# Patient Record
Sex: FEMALE | Race: WHITE | NOT HISPANIC OR LATINO | Employment: FULL TIME | URBAN - METROPOLITAN AREA
[De-identification: names, ages, dates, MRNs, and addresses within clinical notes are randomized per-mention and may not be internally consistent; named-entity substitution may affect disease eponyms.]

---

## 2017-09-13 ENCOUNTER — OFFICE VISIT (OUTPATIENT)
Dept: OBSTETRICS AND GYNECOLOGY | Facility: CLINIC | Age: 42
End: 2017-09-13

## 2017-09-13 VITALS
HEIGHT: 65 IN | WEIGHT: 119 LBS | BODY MASS INDEX: 19.83 KG/M2 | DIASTOLIC BLOOD PRESSURE: 60 MMHG | SYSTOLIC BLOOD PRESSURE: 110 MMHG

## 2017-09-13 DIAGNOSIS — Z12.4 PAP SMEAR FOR CERVICAL CANCER SCREENING: ICD-10-CM

## 2017-09-13 DIAGNOSIS — Z13.9 SCREENING: ICD-10-CM

## 2017-09-13 DIAGNOSIS — Z30.013 INITIATION OF DEPO PROVERA: ICD-10-CM

## 2017-09-13 DIAGNOSIS — Z12.31 SCREENING MAMMOGRAM, ENCOUNTER FOR: ICD-10-CM

## 2017-09-13 DIAGNOSIS — Z11.3 SCREEN FOR STD (SEXUALLY TRANSMITTED DISEASE): ICD-10-CM

## 2017-09-13 DIAGNOSIS — Z01.419 WELL FEMALE EXAM WITH ROUTINE GYNECOLOGICAL EXAM: Primary | ICD-10-CM

## 2017-09-13 DIAGNOSIS — F17.200 SMOKER UNMOTIVATED TO QUIT: ICD-10-CM

## 2017-09-13 PROBLEM — Z30.42 DEPO-PROVERA CONTRACEPTIVE STATUS: Status: ACTIVE | Noted: 2017-09-13

## 2017-09-13 LAB
B-HCG UR QL: NEGATIVE
BILIRUB BLD-MCNC: NEGATIVE MG/DL
CLARITY, POC: CLEAR
COLOR UR: YELLOW
GLUCOSE UR STRIP-MCNC: NEGATIVE MG/DL
INTERNAL NEGATIVE CONTROL: NEGATIVE
INTERNAL POSITIVE CONTROL: POSITIVE
KETONES UR QL: NEGATIVE
LEUKOCYTE EST, POC: NEGATIVE
Lab: NORMAL
NITRITE UR-MCNC: NEGATIVE MG/ML
PH UR: 6 [PH] (ref 5–8)
PROT UR STRIP-MCNC: NEGATIVE MG/DL
RBC # UR STRIP: ABNORMAL /UL
SP GR UR: 1 (ref 1–1.03)
UROBILINOGEN UR QL: NORMAL

## 2017-09-13 PROCEDURE — 99386 PREV VISIT NEW AGE 40-64: CPT | Performed by: OBSTETRICS & GYNECOLOGY

## 2017-09-13 PROCEDURE — 81002 URINALYSIS NONAUTO W/O SCOPE: CPT | Performed by: OBSTETRICS & GYNECOLOGY

## 2017-09-13 PROCEDURE — 81025 URINE PREGNANCY TEST: CPT | Performed by: OBSTETRICS & GYNECOLOGY

## 2017-09-13 PROCEDURE — 96372 THER/PROPH/DIAG INJ SC/IM: CPT | Performed by: OBSTETRICS & GYNECOLOGY

## 2017-09-13 RX ORDER — ALBUTEROL SULFATE 90 UG/1
2 AEROSOL, METERED RESPIRATORY (INHALATION) EVERY 4 HOURS PRN
Refills: 4 | COMMUNITY
Start: 2017-06-20 | End: 2021-12-22

## 2017-09-13 RX ORDER — MEDROXYPROGESTERONE ACETATE 150 MG/ML
150 INJECTION, SUSPENSION INTRAMUSCULAR ONCE
Status: COMPLETED | OUTPATIENT
Start: 2017-09-13 | End: 2017-09-13

## 2017-09-13 RX ORDER — MEDROXYPROGESTERONE ACETATE 150 MG/ML
150 INJECTION, SUSPENSION INTRAMUSCULAR
Qty: 1 EACH | Refills: 4 | Status: SHIPPED | OUTPATIENT
Start: 2017-09-13 | End: 2017-09-13

## 2017-09-13 RX ORDER — FEXOFENADINE HCL AND PSEUDOEPHEDRINE HCI 180; 240 MG/1; MG/1
1 TABLET, EXTENDED RELEASE ORAL AS NEEDED
COMMUNITY
End: 2020-08-23

## 2017-09-13 RX ORDER — MEDROXYPROGESTERONE ACETATE 150 MG/ML
150 INJECTION, SUSPENSION INTRAMUSCULAR
Qty: 1 EACH | Refills: 3 | Status: SHIPPED | OUTPATIENT
Start: 2017-09-13 | End: 2017-12-06 | Stop reason: SDUPTHER

## 2017-09-13 RX ADMIN — MEDROXYPROGESTERONE ACETATE 150 MG: 150 INJECTION, SUSPENSION INTRAMUSCULAR at 11:21

## 2017-09-13 NOTE — PROGRESS NOTES
[facility]  Routine Annual Visit    2017    Patient: Kathleen SEPULVEDA          MR#:[id number]      History of Present Illness    41 y.o. female  who presents for annual exam.    Patient presents without complaints feeling well.  She is an older established patient with no follow-up here since .  Patient's currently using condoms for contraception and wishes to pursue a different method.  Multiple methods were discussed and the patient is resigned to Depo-Provera    Patient's last menstrual period was 2017 (exact date).  Obstetric History:  OB History      Para Term  AB TAB SAB Ectopic Multiple Living    3 3 3       3         Menstrual History:  Menarche age: 6 years  Patient's last menstrual period was 2017 (exact date).  Period Duration (Days): 7  Period Pattern: (!) Irregular  Menstrual Flow: Heavy  Menstrual Control: Maxi pad, Tampon    Sexual History:       ________________________________________  Patient Active Problem List   Diagnosis   • Well female exam with routine gynecological exam   • Smoker unmotivated to quit   • Depo-Provera contraceptive status       Past Medical History:   Diagnosis Date   • Abnormal Pap smear of cervix    • Asthma    • Degenerative disc disease, lumbar    • Depression    • Dysmenorrhea    • Endomyometritis    • Genital warts    • Headache    • Hypothyroid     hyperthyroid   • Irregular heart beat    • Menorrhagia        Past Surgical History:   Procedure Laterality Date   • BREAST BIOPSY      Left Breast Benign    • WISDOM TOOTH EXTRACTION     • WISDOM TOOTH EXTRACTION         History   Smoking Status   • Current Every Day Smoker   • Packs/day: 1.00   • Years: 30.00   • Types: Cigarettes   Smokeless Tobacco   • Never Used       Family History   Problem Relation Age of Onset   • Hypertension Mother    • Heart disease Mother    • Asthma Mother    • Diabetes Mother    • Colon cancer Mother 26  "  • Melanoma Mother      mole on ankle   • Brain cancer Mother      unsure of age   • Cervical cancer Mother      unsure of age   • Stroke Father    • Hypertension Father        Prior to Admission medications    Medication Sig Start Date End Date Taking? Authorizing Provider   fexofenadine-pseudoephedrine (ALLEGRA-D 24) 180-240 MG per 24 hr tablet Take 1 tablet by mouth Daily.   Yes Historical Provider, MD CHUNG  (90 Base) MCG/ACT inhaler  6/20/17  Yes Historical Provider, MD     ________________________________________    Current contraception: condoms  History of abnormal Pap smear: no  Family history of uterine or ovarian cancer: yes - mother  Family History of colon cancer/colon polyps: yes - Mother  History of abnormal mammogram: no  History of abnormal lipids: no    The following portions of the patient's history were reviewed and updated as appropriate: allergies, current medications, past family history, past medical history, past social history, past surgical history and problem list.    Review of Systems    Pertinent items are noted in HPI.     Objective   Physical Exam    /60  Ht 65\" (165.1 cm)  Wt 119 lb (54 kg)  LMP 09/03/2017 (Exact Date)  Breastfeeding? No  BMI 19.8 kg/m2   BP Readings from Last 3 Encounters:   09/13/17 110/60      Wt Readings from Last 3 Encounters:   09/13/17 119 lb (54 kg)        BMI: Estimated body mass index is 19.8 kg/(m^2) as calculated from the following:    Height as of this encounter: 65\" (165.1 cm).    Weight as of this encounter: 119 lb (54 kg).    General:   alert, appears stated age and cooperative   Heart: regular rate and rhythm, S1, S2 normal, no murmur, click, rub or gallop   Lungs: clear to auscultation bilaterally   Abdomen: soft, non-tender, without masses or organomegaly   Breast: inspection negative, no nipple discharge or bleeding, no masses or nodularity palpable   Vulva: normal   Vagina: normal mucosa   Cervix: no lesions   Uterus: " normal size   Adnexa: normal adnexa     As part of wellness and prevention, the following topics were discussed with the patient:    []  Nutrition  []  Physical activity/regular exercise   []  Healthy weight  []  Injury prevention  []  Substance misuse/abuse  []  Sexual behavior  []  STD prevention  [x]  Contaception  []  Dental health  []  Mental health  []  Immunization  [x]  Encouraged SBE       Assessment:    Kathleen was seen today for gynecologic exam.    Diagnoses and all orders for this visit:    Well female exam with routine gynecological exam    Screening  -     POC Urinalysis Dipstick    Pap smear for cervical cancer screening  -     IgP, Aptima HPV    Smoker unmotivated to quit    Screening mammogram, encounter for  -     Mammo Screening Bilateral With CAD; Future    Other orders  -     medroxyPROGESTERone (DEPO-PROVERA) 150 MG/ML injection; Inject 1 mL into the shoulder, thigh, or buttocks Every 3 (Three) Months.          Plan:  Return in about 3 months (around 12/13/2017) for depo provera.      Henri Painter MD  9/13/2017 11:19 AM

## 2017-09-15 ENCOUNTER — TELEPHONE (OUTPATIENT)
Dept: OBSTETRICS AND GYNECOLOGY | Facility: CLINIC | Age: 42
End: 2017-09-15

## 2017-09-15 LAB
C TRACH RRNA SPEC QL NAA+PROBE: NEGATIVE
N GONORRHOEA RRNA SPEC QL NAA+PROBE: NEGATIVE
T VAGINALIS RRNA SPEC QL NAA+PROBE: NEGATIVE

## 2017-09-15 NOTE — TELEPHONE ENCOUNTER
Mailed patient Depo Provera patient information sheet.  Couldn't locate when patient was in for her annual visit 9/13/17.

## 2017-09-18 ENCOUNTER — TELEPHONE (OUTPATIENT)
Dept: OBSTETRICS AND GYNECOLOGY | Facility: CLINIC | Age: 42
End: 2017-09-18

## 2017-09-18 LAB
CYTOLOGIST CVX/VAG CYTO: NORMAL
CYTOLOGY CVX/VAG DOC THIN PREP: NORMAL
DX ICD CODE: NORMAL
HIV 1 & 2 AB SER-IMP: NORMAL
HPV I/H RISK 4 DNA CVX QL PROBE+SIG AMP: NEGATIVE
OTHER STN SPEC: NORMAL
PATH REPORT.FINAL DX SPEC: NORMAL
STAT OF ADQ CVX/VAG CYTO-IMP: NORMAL

## 2017-09-18 NOTE — TELEPHONE ENCOUNTER
----- Message from Henri Painter MD sent at 9/18/2017 10:55 AM EDT -----  Call pt:  PAP is negative.

## 2017-09-27 ENCOUNTER — APPOINTMENT (OUTPATIENT)
Dept: MAMMOGRAPHY | Facility: HOSPITAL | Age: 42
End: 2017-09-27
Attending: OBSTETRICS & GYNECOLOGY

## 2017-12-06 ENCOUNTER — CLINICAL SUPPORT (OUTPATIENT)
Dept: OBSTETRICS AND GYNECOLOGY | Facility: CLINIC | Age: 42
End: 2017-12-06

## 2017-12-06 DIAGNOSIS — Z30.42 ENCOUNTER FOR DEPO-PROVERA CONTRACEPTION: ICD-10-CM

## 2017-12-06 DIAGNOSIS — Z13.9 SPECIAL SCREENING: Primary | ICD-10-CM

## 2017-12-06 LAB
B-HCG UR QL: NEGATIVE
INTERNAL NEGATIVE CONTROL: NEGATIVE
INTERNAL POSITIVE CONTROL: POSITIVE
Lab: NORMAL

## 2017-12-06 PROCEDURE — 96372 THER/PROPH/DIAG INJ SC/IM: CPT | Performed by: OBSTETRICS & GYNECOLOGY

## 2017-12-06 PROCEDURE — 81025 URINE PREGNANCY TEST: CPT | Performed by: OBSTETRICS & GYNECOLOGY

## 2017-12-06 RX ORDER — MEDROXYPROGESTERONE ACETATE 150 MG/ML
150 INJECTION, SUSPENSION INTRAMUSCULAR ONCE
Status: COMPLETED | OUTPATIENT
Start: 2017-12-06 | End: 2017-12-06

## 2017-12-06 RX ADMIN — MEDROXYPROGESTERONE ACETATE 150 MG: 150 INJECTION, SUSPENSION INTRAMUSCULAR at 10:11

## 2018-02-28 ENCOUNTER — CLINICAL SUPPORT (OUTPATIENT)
Dept: OBSTETRICS AND GYNECOLOGY | Age: 43
End: 2018-02-28

## 2018-02-28 DIAGNOSIS — Z30.42 ENCOUNTER FOR DEPO-PROVERA CONTRACEPTION: ICD-10-CM

## 2018-02-28 DIAGNOSIS — Z13.9 SPECIAL SCREENING: Primary | ICD-10-CM

## 2018-02-28 PROCEDURE — 81025 URINE PREGNANCY TEST: CPT | Performed by: OBSTETRICS & GYNECOLOGY

## 2018-02-28 PROCEDURE — 96372 THER/PROPH/DIAG INJ SC/IM: CPT | Performed by: OBSTETRICS & GYNECOLOGY

## 2018-02-28 RX ORDER — MEDROXYPROGESTERONE ACETATE 150 MG/ML
150 INJECTION, SUSPENSION INTRAMUSCULAR ONCE
Status: COMPLETED | OUTPATIENT
Start: 2018-02-28 | End: 2018-02-28

## 2018-02-28 RX ADMIN — MEDROXYPROGESTERONE ACETATE 150 MG: 150 INJECTION, SUSPENSION INTRAMUSCULAR at 13:26

## 2018-05-23 ENCOUNTER — PROCEDURE VISIT (OUTPATIENT)
Dept: OBSTETRICS AND GYNECOLOGY | Age: 43
End: 2018-05-23

## 2018-05-23 ENCOUNTER — OFFICE VISIT (OUTPATIENT)
Dept: OBSTETRICS AND GYNECOLOGY | Age: 43
End: 2018-05-23

## 2018-05-23 VITALS
WEIGHT: 131 LBS | HEIGHT: 65 IN | DIASTOLIC BLOOD PRESSURE: 60 MMHG | SYSTOLIC BLOOD PRESSURE: 110 MMHG | BODY MASS INDEX: 21.83 KG/M2

## 2018-05-23 DIAGNOSIS — Z30.42 DEPO-PROVERA CONTRACEPTIVE STATUS: ICD-10-CM

## 2018-05-23 DIAGNOSIS — Z11.3 SCREENING FOR STD (SEXUALLY TRANSMITTED DISEASE): ICD-10-CM

## 2018-05-23 DIAGNOSIS — N93.9 ABNORMAL UTERINE BLEEDING (AUB): Primary | ICD-10-CM

## 2018-05-23 DIAGNOSIS — F17.200 SMOKER UNMOTIVATED TO QUIT: ICD-10-CM

## 2018-05-23 DIAGNOSIS — D25.1 FIBROIDS, INTRAMURAL: ICD-10-CM

## 2018-05-23 DIAGNOSIS — Z13.9 SPECIAL SCREENING: ICD-10-CM

## 2018-05-23 PROCEDURE — 76830 TRANSVAGINAL US NON-OB: CPT | Performed by: OBSTETRICS & GYNECOLOGY

## 2018-05-23 PROCEDURE — 99214 OFFICE O/P EST MOD 30 MIN: CPT | Performed by: OBSTETRICS & GYNECOLOGY

## 2018-05-23 RX ORDER — ACETAMINOPHEN AND CODEINE PHOSPHATE 120; 12 MG/5ML; MG/5ML
1 SOLUTION ORAL DAILY
Qty: 28 TABLET | Refills: 12 | Status: SHIPPED | OUTPATIENT
Start: 2018-05-23 | End: 2018-06-12

## 2018-05-23 NOTE — PROGRESS NOTES
Ultrasound Note     2018    Patient:  Kathleen Villanueva      MR#:[id number]    42 y.o.   for GYN US for AUB     Patient Active Problem List   Diagnosis   • Well female exam with routine gynecological exam   • Smoker unmotivated to quit   • Abnormal uterine bleeding (AUB)       [See the scanned report in the media tab for more details]    Impression    1.  Normal size uterus: 6.4 x 3.4 x 3.9 cm  2.  Endometrium: 2.1 mm  3:  Myometrium: Predominantly heterogeneous with a single fibroid 2.2 x 1.6 cm  4.  Ovaries Left: Normal, Right normal    Relevant comparison data available:  [x]  None          Henri Painter MD  2018 3:41 PM

## 2018-05-23 NOTE — PROGRESS NOTES
2018      Patient:  Kathleen Villanueva   MR#:[id number]    Office note    Chief Complaint   Patient presents with   • Follow-up     AUB, spotting on and off since aug , depo did not help       Subjective     History of Present Illness  42 y.o. female  the patient presents with complaint of intermittent mostly mild frequent breakthrough bleeding.  She reports minimal improvement on the Depo-Provera shot.  She states she can never go anywhere without a pad and is looking for some type of long-term solution.    Ultrasound is performed and reviewed with the patient.  Findings show a normal uterus with endometrial lining 2.1 mm both ovaries appear within normal limits there is a small 1.6 x 2.2 cm fibroid that's posterior and intramural.    Discussed multiple options including endometrial ablation, alternative hormonal therapy and finally minimally invasive hysterectomy.  After discussing options the patient's most interested in trying a different oral hormonal therapy.  We discussed that estrogens precluded in a tobacco user but Micronor progestin only oral contraceptive pill as an option.      Patient Active Problem List   Diagnosis   • Well female exam with routine gynecological exam   • Smoker unmotivated to quit   • Abnormal uterine bleeding (AUB)       Past Medical History:   Diagnosis Date   • Abnormal Pap smear of cervix    • Asthma    • Degenerative disc disease, lumbar    • Depression    • Dysmenorrhea    • Endomyometritis    • Genital warts    • Headache    • Hypothyroid     hyperthyroid   • Irregular heart beat    • Menorrhagia        Past Surgical History:   Procedure Laterality Date   • BREAST BIOPSY      Left Breast Benign    • WISDOM TOOTH EXTRACTION     • WISDOM TOOTH EXTRACTION         Obstetric History:  OB History      Para Term  AB Living    3 3 3     3    SAB TAB Ectopic Molar Multiple Live Births              3         Menstrual  History:     No LMP recorded (lmp unknown).       #: 1, Date: 07/1997, Sex: Male, Weight: 3459 g (7 lb 10 oz), GA: 40w0d, Delivery: Vaginal, Spontaneous Delivery, Apgar1: None, Apgar5: None, Living: Living, Birth Comments: None    #: 2, Date: 04/19/04, Sex: Male, Weight: 3033 g (6 lb 11 oz), GA: 38w0d, Delivery: Vaginal, Spontaneous Delivery, Apgar1: 9, Apgar5: 9, Living: Living, Birth Comments: None    #: 3, Date: 01/04/07, Sex: Female, Weight: 2778 g (6 lb 2 oz), GA: 39w0d, Delivery: Vaginal, Spontaneous Delivery, Apgar1: 8, Apgar5: 9, Living: Living, Birth Comments: None      Family History   Problem Relation Age of Onset   • Hypertension Mother    • Heart disease Mother    • Asthma Mother    • Diabetes Mother    • Colon cancer Mother 26   • Melanoma Mother         mole on ankle   • Brain cancer Mother         unsure of age   • Cervical cancer Mother         unsure of age   • Stroke Father    • Hypertension Father    • Breast cancer Neg Hx    • Ovarian cancer Neg Hx    • Uterine cancer Neg Hx    • Prostate cancer Neg Hx        Social History   Substance Use Topics   • Smoking status: Current Every Day Smoker     Packs/day: 1.00     Years: 30.00     Types: Cigarettes   • Smokeless tobacco: Never Used   • Alcohol use Yes      Comment: rare       Ultram [tramadol]      Current Outpatient Prescriptions:   •  fexofenadine-pseudoephedrine (ALLEGRA-D 24) 180-240 MG per 24 hr tablet, Take 1 tablet by mouth Daily., Disp: , Rfl:   •  VENTOLIN  (90 Base) MCG/ACT inhaler, , Disp: , Rfl: 4  •  norethindrone (MICRONOR) 0.35 MG tablet, Take 1 tablet by mouth Daily., Disp: 28 tablet, Rfl: 12    The following portions of the patient's history were reviewed and updated as appropriate: allergies, current medications, past family history, past medical history, past social history, past surgical history and problem list.    Review of Systems   Constitutional: Negative.    Respiratory: Negative.    Cardiovascular: Negative.   "  Gastrointestinal: Negative.    Genitourinary: Positive for menstrual problem and vaginal bleeding.   Psychiatric/Behavioral: Negative.        BP Readings from Last 3 Encounters:   05/23/18 110/60   09/13/17 110/60      Wt Readings from Last 3 Encounters:   05/23/18 59.4 kg (131 lb)   09/13/17 54 kg (119 lb)      BMI: Estimated body mass index is 21.8 kg/m² as calculated from the following:    Height as of this encounter: 165.1 cm (65\").    Weight as of this encounter: 59.4 kg (131 lb).  BSA: Estimated body surface area is 1.65 meters squared as calculated from the following:    Height as of this encounter: 165.1 cm (65\").    Weight as of this encounter: 59.4 kg (131 lb).    Objective   Physical Exam   Constitutional: She is oriented to person, place, and time. She appears well-developed and well-nourished.   Cardiovascular: Normal rate and regular rhythm.    Pulmonary/Chest: Effort normal and breath sounds normal.   Abdominal: Soft. Bowel sounds are normal. She exhibits no distension and no mass. There is no tenderness.   Genitourinary: Vagina normal and uterus normal.   Genitourinary Comments: No cervical lesions  No bleeding noted    Neurological: She is alert and oriented to person, place, and time.   Psychiatric: She has a normal mood and affect. Her behavior is normal. Judgment and thought content normal.   Nursing note and vitals reviewed.        Assessment/Plan     Kathleen was seen today for follow-up.    Diagnoses and all orders for this visit:    Abnormal uterine bleeding (AUB)    Special screening  -     Cancel: POC Pregnancy, Urine    Smoker unmotivated to quit    Screening for STD (sexually transmitted disease)  -     Chlamydia trachomatis, Neisseria gonorrhoeae, Trichomonas vaginalis, PCR - Swab, Urine, Clean Catch    Fibroids, intramural    Other orders  -     norethindrone (MICRONOR) 0.35 MG tablet; Take 1 tablet by mouth Daily.      Discussed options at some length.  I had a detailed discussion " regarding endometrial ablation and a detailed discussion regarding minimally invasive hysterectomy.  Pros and cons of either surgical intervention and pros and cons of an alternative hormonal therapy were discussed    Return if symptoms worsen or fail to improve.        Time with patient: Greater then 25 minutes, 75% of that time was spent face-to-face discussing procedural interventions        Henri Painter MD   5/23/2018 3:37 PM

## 2018-05-29 DIAGNOSIS — N93.9 ABNORMAL UTERINE BLEEDING (AUB): Primary | ICD-10-CM

## 2018-05-30 ENCOUNTER — OFFICE VISIT (OUTPATIENT)
Dept: OBSTETRICS AND GYNECOLOGY | Age: 43
End: 2018-05-30

## 2018-05-30 VITALS
WEIGHT: 131 LBS | SYSTOLIC BLOOD PRESSURE: 122 MMHG | BODY MASS INDEX: 21.83 KG/M2 | HEIGHT: 65 IN | DIASTOLIC BLOOD PRESSURE: 64 MMHG

## 2018-05-30 DIAGNOSIS — F17.200 SMOKER UNMOTIVATED TO QUIT: ICD-10-CM

## 2018-05-30 DIAGNOSIS — N93.9 ABNORMAL UTERINE BLEEDING (AUB): Primary | ICD-10-CM

## 2018-05-30 PROCEDURE — 99214 OFFICE O/P EST MOD 30 MIN: CPT | Performed by: OBSTETRICS & GYNECOLOGY

## 2018-05-30 NOTE — PROGRESS NOTES
2018      Patient:  Kathleen Villanueva   MR#:[id number]    Office note    Chief Complaint   Patient presents with   • Consult     [facility] CONSULT       Subjective     History of Present Illness  42 y.o. female  presents for follow-up on intermittent irregular mostly light to moderate bleeding poorly responsive to Depo-Provera for the last two years.  Workup is essentially noncontributory except for a posterior small uterine fibroid.  Previously options for treating the problem is been discussed and the patient considered endometrial ablation but after reading about the procedure does not wish to pursue that avenue.  Minimally invasive hysterectomy was discussed as well the patient returns for the consult for the surgical procedure.    Lengthy discussion on the procedure occurred.  The patient has mandatory downtime at work in the month of  and July and requests the procedure be scheduled quite promptly.        Patient Active Problem List   Diagnosis   • Well female exam with routine gynecological exam   • Smoker unmotivated to quit   • Abnormal uterine bleeding (AUB)       Past Medical History:   Diagnosis Date   • Abnormal Pap smear of cervix    • Asthma    • Degenerative disc disease, lumbar    • Depression    • Dysmenorrhea    • Endomyometritis    • Genital warts    • Headache    • Hypothyroid     hyperthyroid   • Irregular heart beat    • Menorrhagia        Past Surgical History:   Procedure Laterality Date   • BREAST BIOPSY      Left Breast Benign    • WISDOM TOOTH EXTRACTION     • WISDOM TOOTH EXTRACTION         Obstetric History:  OB History      Para Term  AB Living    3 3 3     3    SAB TAB Ectopic Molar Multiple Live Births              3         Menstrual History:     No LMP recorded (lmp unknown).       #: 1, Date: 1999, Sex: Male, Weight: 3459 g (7 lb 10 oz), GA: 40w0d, Delivery: Vaginal, Spontaneous Delivery, Apgar1: None, Apgar5: None,  Living: Living, Birth Comments: None    #: 2, Date: [birth year]19/04, Sex: Male, Weight: 3033 g (6 lb 11 oz), GA: 38w0d, Delivery: Vaginal, Spontaneous Delivery, Apgar1: 9, Apgar5: 9, Living: Living, Birth Comments: None    #: 3, Date: 01/04/07, Sex: Female, Weight: 2778 g (6 lb 2 oz), GA: 39w0d, Delivery: Vaginal, Spontaneous Delivery, Apgar1: 8, Apgar5: 9, Living: Living, Birth Comments: None      Family History   Problem Relation Age of Onset   • Hypertension Mother    • Heart disease Mother    • Asthma Mother    • Diabetes Mother    • Colon cancer Mother 26   • Melanoma Mother         mole on ankle   • Brain cancer Mother         unsure of age   • Cervical cancer Mother         unsure of age   • Stroke Father    • Hypertension Father    • Breast cancer Neg Hx    • Ovarian cancer Neg Hx    • Uterine cancer Neg Hx    • Prostate cancer Neg Hx        Social History   Substance Use Topics   • Smoking status: Current Every Day Smoker     Packs/day: 1.00     Years: 30.00     Types: Cigarettes   • Smokeless tobacco: Never Used   • Alcohol use Yes      Comment: rare       Ultram [tramadol]      Current Outpatient Prescriptions:   •  fexofenadine-pseudoephedrine (ALLEGRA-D 24) 180-240 MG per 24 hr tablet, Take 1 tablet by mouth As Needed., Disp: , Rfl:   •  Multiple Vitamins-Minerals (HAIR SKIN AND NAILS FORMULA PO), Take  by mouth Daily., Disp: , Rfl:   •  norethindrone (MICRONOR) 0.35 MG tablet, Take 1 tablet by mouth Daily., Disp: 28 tablet, Rfl: 12  •  VENTOLIN  (90 Base) MCG/ACT inhaler, Inhale 2 puffs Every 4 (Four) Hours As Needed., Disp: , Rfl: 4    The following portions of the patient's history were reviewed and updated as appropriate: allergies, current medications, past family history, past medical history, past social history, past surgical history and problem list.    Review of Systems    BP Readings from Last 3 Encounters:   05/30/18 122/64   05/23/18 110/60   09/13/17 110/60      Wt Readings from Last 3  "Encounters:   05/30/18 59.4 kg (131 lb)   05/23/18 59.4 kg (131 lb)   09/13/17 54 kg (119 lb)      BMI: Estimated body mass index is 21.8 kg/m² as calculated from the following:    Height as of this encounter: 165.1 cm (65\").    Weight as of this encounter: 59.4 kg (131 lb).  BSA: Estimated body surface area is 1.65 meters squared as calculated from the following:    Height as of this encounter: 165.1 cm (65\").    Weight as of this encounter: 59.4 kg (131 lb).    Objective   Physical Exam   Constitutional: She is oriented to person, place, and time. She appears well-developed and well-nourished. No distress.   HENT:   Head: Normocephalic and atraumatic.   Pulmonary/Chest: Effort normal.   Abdominal: Soft. Bowel sounds are normal. She exhibits no distension and no mass. There is no tenderness.   Genitourinary:   Genitourinary Comments: Perineum well healed.    Lochia minimal    Neurological: She is alert and oriented to person, place, and time.   Skin: Skin is warm and dry. She is not diaphoretic.   Psychiatric: She has a normal mood and affect. Her behavior is normal. Judgment and thought content normal.   Nursing note and vitals reviewed.        Assessment/Plan     Kathleen was seen today for consult.    Diagnoses and all orders for this visit:    Abnormal uterine bleeding (AUB)    Smoker unmotivated to quit    Plan:   Case request submitted previously    Total laparoscopic hysterectomy with bilateral salpingectomy (ovarian conservation)    [Total laparoscopic hysterectomy]:  Minimally invasive approaches to hysterectomy are reviewed with the patient.     The surgical procedure is discussed with the patient in detail.  I discussed the risks of the surgical procedure including,but not limited to the risk of bleeding, infection, and damage to internal organs.  In exceeding rare cases, death has been reported from surgical complications involving hysterectomy.      It is customary with this procedure to remove both " "fallopian tubes.  More recent researches suggested that fallopian tubes may be the source of a type of cancer that was previously attributed to the ovaries.  As well, it is usual practice to conserve the ovaries outside of significant pathology.     In cases where extensive scar tissue, fibroids, or uncontrolled bleeding is encountered, it may become necessary to convert the procedure to an “open\" laparotomy hysterectomy involving a longer recovery.     The procedure entails very close operative proximity to the bladder and ureters.  There is a risk of injury to these structures.  It is usual practice to inspect the bladder and insure functioning ureters at the conclusion of the procedure using cystoscopy (camera in the bladder).     In addition to routine postoperative instructions provided, all patients are advised that they MUST avoid vaginal penetration for 6-8 weeks postoperative until the upper vaginal cuff is inspected for proper healing.  There is a rare incidence of vaginal cuff separation that can require emergent intervention.        No Follow-up on file.    Time with patient: 25 minutes with more than 90% of that time face-to-face detailing the surgical procedure.    Henri Painter MD   5/30/2018 3:47 PM  "

## 2018-06-12 ENCOUNTER — APPOINTMENT (OUTPATIENT)
Dept: PREADMISSION TESTING | Facility: HOSPITAL | Age: 43
End: 2018-06-12

## 2018-06-12 VITALS
HEIGHT: 65 IN | TEMPERATURE: 98.2 F | HEART RATE: 89 BPM | BODY MASS INDEX: 21.64 KG/M2 | OXYGEN SATURATION: 99 % | SYSTOLIC BLOOD PRESSURE: 123 MMHG | DIASTOLIC BLOOD PRESSURE: 85 MMHG | WEIGHT: 129.9 LBS | RESPIRATION RATE: 16 BRPM

## 2018-06-12 LAB
ANION GAP SERPL CALCULATED.3IONS-SCNC: 12 MMOL/L
BACTERIA UR QL AUTO: NORMAL /HPF
BILIRUB UR QL STRIP: NEGATIVE
BUN BLD-MCNC: 9 MG/DL (ref 6–20)
BUN/CREAT SERPL: 10.3 (ref 7–25)
CALCIUM SPEC-SCNC: 9.7 MG/DL (ref 8.6–10.5)
CHLORIDE SERPL-SCNC: 103 MMOL/L (ref 98–107)
CLARITY UR: CLEAR
CO2 SERPL-SCNC: 24 MMOL/L (ref 22–29)
COLOR UR: YELLOW
CREAT BLD-MCNC: 0.87 MG/DL (ref 0.57–1)
DEPRECATED RDW RBC AUTO: 44.9 FL (ref 37–54)
ERYTHROCYTE [DISTWIDTH] IN BLOOD BY AUTOMATED COUNT: 13.5 % (ref 11.7–13)
GFR SERPL CREATININE-BSD FRML MDRD: 71 ML/MIN/1.73
GLUCOSE BLD-MCNC: 92 MG/DL (ref 65–99)
GLUCOSE UR STRIP-MCNC: NEGATIVE MG/DL
HCG SERPL QL: NEGATIVE
HCT VFR BLD AUTO: 46.4 % (ref 35.6–45.5)
HGB BLD-MCNC: 15.1 G/DL (ref 11.9–15.5)
HGB UR QL STRIP.AUTO: ABNORMAL
HYALINE CASTS UR QL AUTO: NORMAL /LPF
KETONES UR QL STRIP: NEGATIVE
LEUKOCYTE ESTERASE UR QL STRIP.AUTO: NEGATIVE
MCH RBC QN AUTO: 29.9 PG (ref 26.9–32)
MCHC RBC AUTO-ENTMCNC: 32.5 G/DL (ref 32.4–36.3)
MCV RBC AUTO: 91.9 FL (ref 80.5–98.2)
NITRITE UR QL STRIP: NEGATIVE
PH UR STRIP.AUTO: 5.5 [PH] (ref 5–8)
PLATELET # BLD AUTO: 318 10*3/MM3 (ref 140–500)
PMV BLD AUTO: 11.3 FL (ref 6–12)
POTASSIUM BLD-SCNC: 4.2 MMOL/L (ref 3.5–5.2)
PROT UR QL STRIP: NEGATIVE
RBC # BLD AUTO: 5.05 10*6/MM3 (ref 3.9–5.2)
RBC # UR: NORMAL /HPF
REF LAB TEST METHOD: NORMAL
SODIUM BLD-SCNC: 139 MMOL/L (ref 136–145)
SP GR UR STRIP: <=1.005 (ref 1–1.03)
SQUAMOUS #/AREA URNS HPF: NORMAL /HPF
TRANS CELLS #/AREA URNS HPF: NORMAL /HPF
UROBILINOGEN UR QL STRIP: ABNORMAL
WBC NRBC COR # BLD: 10.15 10*3/MM3 (ref 4.5–10.7)
WBC UR QL AUTO: NORMAL /HPF

## 2018-06-12 PROCEDURE — 93010 ELECTROCARDIOGRAM REPORT: CPT | Performed by: INTERNAL MEDICINE

## 2018-06-12 PROCEDURE — 81001 URINALYSIS AUTO W/SCOPE: CPT | Performed by: OBSTETRICS & GYNECOLOGY

## 2018-06-12 PROCEDURE — 36415 COLL VENOUS BLD VENIPUNCTURE: CPT

## 2018-06-12 PROCEDURE — 80048 BASIC METABOLIC PNL TOTAL CA: CPT | Performed by: OBSTETRICS & GYNECOLOGY

## 2018-06-12 PROCEDURE — 93005 ELECTROCARDIOGRAM TRACING: CPT

## 2018-06-12 PROCEDURE — 84703 CHORIONIC GONADOTROPIN ASSAY: CPT | Performed by: OBSTETRICS & GYNECOLOGY

## 2018-06-12 PROCEDURE — 85027 COMPLETE CBC AUTOMATED: CPT | Performed by: OBSTETRICS & GYNECOLOGY

## 2018-06-12 NOTE — DISCHARGE INSTRUCTIONS
ARRIVAL TIME 05:30        General Instructions:  • Do not eat solid food after midnight the night before surgery.  • You may drink clear liquids day of surgery but must stop at least one hour before your hospital arrival time.  • It is beneficial for you to have a clear drink that contains carbohydrates the day of surgery.  We suggest a 12 to 20 ounce bottle of Gatorade or Powerade for non-diabetic patients or a 12 to 20 ounce bottle of G2 or Powerade Zero for diabetic patients. (Pediatric patients, are not advised to drink a 12 to 20 ounce carbohydrate drink)    Clear liquids are liquids you can see through.  Nothing red in color.     Plain water                               Sports drinks  Sodas                                   Gelatin (Jell-O)  Fruit juices without pulp such as white grape juice and apple juice  Popsicles that contain no fruit or yogurt  Tea or coffee (no cream or milk added)  Gatorade / Powerade  G2 / Powerade Zero    • Infants may have breast milk up to four hours before surgery.  • Infants drinking formula may drink formula up to six hours before surgery.   • Patients who avoid smoking, chewing tobacco and alcohol for 4 weeks prior to surgery have a reduced risk of post-operative complications.  Quit smoking as many days before surgery as you can.  • Do not smoke, use chewing tobacco or drink alcohol the day of surgery.   • If applicable bring your C-PAP/ BI-PAP machine.  • Bring any papers given to you in the doctor’s office.  • Wear clean comfortable clothes and socks.  • Do not wear contact lenses or make-up.  Bring a case for your glasses.   • Bring crutches or walker if applicable.  • Remove all piercings.  Leave jewelry and any other valuables at home.  • Hair extensions with metal clips must be removed prior to surgery.  • The Pre-Admission Testing nurse will instruct you to bring medications if unable to obtain an accurate list in Pre-Admission Testing.        If you were given a  blood bank ID arm band remember to bring it with you the day of surgery.    Preventing a Surgical Site Infection:  • For 2 to 3 days before surgery, avoid shaving with a razor because the razor can irritate skin and make it easier to develop an infection.  • The night prior to surgery sleep in a clean bed with clean clothing.  Do not allow pets to sleep with you.  • Shower on the morning of surgery using a fresh bar of anti-bacterial soap (such as Dial) and clean washcloth.  Dry with a clean towel and dress in clean clothing.  • Ask your surgeon if you will be receiving antibiotics prior to surgery.  • Make sure you, your family, and all healthcare providers clean their hands with soap and water or an alcohol based hand  before caring for you or your wound.    Day of surgery:  Upon arrival, a Pre-op nurse and Anesthesiologist will review your health history, obtain vital signs, and answer questions you may have.  The only belongings needed at this time will be your home medications and if applicable your C-PAP/BI-PAP machine.  If you are staying overnight your family can leave the rest of your belongings in the car and bring them to your room later.  A Pre-op nurse will start an IV and you may receive medication in preparation for surgery, including something to help you relax.  Your family will be able to see you in the Pre-op area.  While you are in surgery your family should notify the waiting room  if they leave the waiting room area and provide a contact phone number.    Please be aware that surgery does come with discomfort.  We want to make every effort to control your discomfort so please discuss any uncontrolled symptoms with your nurse.   Your doctor will most likely have prescribed pain medications.      If you are going home after surgery you will receive individualized written care instructions before being discharged.  A responsible adult must drive you to and from the hospital on  the day of your surgery and stay with you for 24 hours.    If you are staying overnight following surgery, you will be transported to your hospital room following the recovery period.  [facility] has all private rooms.    If you have any questions please call Pre-Admission Testing at [phone].  Deductibles and co-payments are collected on the day of service. Please be prepared to pay the required co-pay, deductible or deposit on the day of service as defined by your plan.

## 2018-06-13 NOTE — H&P
History & Physical    2018    Patient: Kathleen Villanueva          MR#:[id number]    Chief complaint:  Abnormal uterine bleeding    Subjective     Patient is a 42 y.o. female  presents for follow-up on intermittent irregular mostly light to moderate bleeding poorly responsive to Depo-Provera for the last two years.  Workup is essentially noncontributory except for a posterior small uterine fibroid.  Previously options for treating the problem has been discussed and the patient considered endometrial ablation but after reading about the procedure does not wish to pursue that avenue.  Minimally invasive hysterectomy was discussed as well the patient returns for the surgical procedure.    GYN US:  Impression     1.  Normal size uterus: 6.4 x 3.4 x 3.9 cm  2.  Endometrium: 2.1 mm  3:  Myometrium: Predominantly heterogeneous with a single fibroid 2.2 x 1.6 cm  4.  Ovaries Left: Normal, Right normal         Patient Active Problem List   Diagnosis   • Well female exam with routine gynecological exam   • Smoker unmotivated to quit   • Abnormal uterine bleeding (AUB)       Past Medical History:   Diagnosis Date   • Abnormal Pap smear of cervix    • Asthma    • DDD (degenerative disc disease), thoracolumbar    • Degenerative disc disease, lumbar    • Depression    • Dysmenorrhea    • Elevated cholesterol    • Endomyometritis    • Genital warts    • Headache    • Hx of renal calculi    • Hyperthyroidism    • Irregular heart beat    • Menorrhagia    • Sleep apnea     NO CPAP USE       Past Surgical History:   Procedure Laterality Date   • BREAST BIOPSY      Left Breast Benign    • WISDOM TOOTH EXTRACTION         Obstetric History:  OB History      Para Term  AB Living    3 3 3     3    SAB TAB Ectopic Molar Multiple Live Births              3         Menstrual History:     No LMP recorded (lmp unknown).       #: 1, Date: 1997, Sex: Male, Weight: 3459 g (7 lb 10 oz),  GA: 40w0d, Delivery: Vaginal, Spontaneous Delivery, Apgar1: None, Apgar5: None, Living: Living, Birth Comments: None    #: 2, Date: 04/19/04, Sex: Male, Weight: 3033 g (6 lb 11 oz), GA: 38w0d, Delivery: Vaginal, Spontaneous Delivery, Apgar1: 9, Apgar5: 9, Living: Living, Birth Comments: None    #: 3, Date: 01/04/07, Sex: Female, Weight: 2778 g (6 lb 2 oz), GA: 39w0d, Delivery: Vaginal, Spontaneous Delivery, Apgar1: 8, Apgar5: 9, Living: Living, Birth Comments: None      Family History   Problem Relation Age of Onset   • Hypertension Mother    • Heart disease Mother    • Asthma Mother    • Diabetes Mother    • Colon cancer Mother 26   • Melanoma Mother         mole on ankle   • Brain cancer Mother         unsure of age   • Cervical cancer Mother         unsure of age   • Stroke Father    • Hypertension Father    • Breast cancer Neg Hx    • Ovarian cancer Neg Hx    • Uterine cancer Neg Hx    • Prostate cancer Neg Hx    • Malig Hyperthermia Neg Hx        Social History   Substance Use Topics   • Smoking status: Current Every Day Smoker     Packs/day: 1.00     Years: 30.00     Types: Cigarettes   • Smokeless tobacco: Never Used   • Alcohol use Yes      Comment: rare       Ultram [tramadol]    No current facility-administered medications for this encounter.     Current Outpatient Prescriptions:   •  fexofenadine-pseudoephedrine (ALLEGRA-D 24) 180-240 MG per 24 hr tablet, Take 1 tablet by mouth As Needed., Disp: , Rfl:   •  Multiple Vitamins-Minerals (HAIR SKIN AND NAILS FORMULA PO), Take  by mouth Daily., Disp: , Rfl:   •  VENTOLIN  (90 Base) MCG/ACT inhaler, Inhale 2 puffs Every 4 (Four) Hours As Needed., Disp: , Rfl: 4    Review of Systems  Review of Systems   Constitutional: Negative.    Respiratory: Negative.    Cardiovascular: Negative.    Gastrointestinal: Negative.    Genitourinary: Positive for menstrual problem and vaginal bleeding.   Psychiatric/Behavioral: Negative.        Objective     Vital Signs    "    Physical Exam:  Physical Exam   Constitutional: She is oriented to person, place, and time. She appears well-developed and well-nourished.   Cardiovascular: Normal rate and regular rhythm.    Pulmonary/Chest: Effort normal and breath sounds normal.   Abdominal: Soft. Bowel sounds are normal. She exhibits no distension and no mass. There is no tenderness.   Genitourinary: Vagina normal and uterus normal.   Genitourinary Comments: Mobile uterus   Neurological: She is alert and oriented to person, place, and time.   Psychiatric: She has a normal mood and affect. Her behavior is normal. Judgment and thought content normal.   Nursing note and vitals reviewed.      Labs:  Lab Results (last 24 hours)     ** No results found for the last 24 hours. **        Assessment/Plan     1.  Abnormal uterine bleeding  2.  Posterior intramural fibroid  3.  Dysmenorrhea     Plan:  [facility]    [Total laparoscopic hysterectomy]:  Minimally invasive approaches to hysterectomy are reviewed with the patient.     The surgical procedure is discussed with the patient in detail.  I discussed the risks of the surgical procedure including,but not limited to the risk of bleeding, infection, and damage to internal organs.  In exceeding rare cases, death has been reported from surgical complications involving hysterectomy.      It is customary with this procedure to remove both fallopian tubes.  More recent researches suggested that fallopian tubes may be the source of a type of cancer that was previously attributed to the ovaries.  As well, it is usual practice to conserve the ovaries outside of significant pathology.     In cases where extensive scar tissue, fibroids, or uncontrolled bleeding is encountered, it may become necessary to convert the procedure to an “open\" laparotomy hysterectomy involving a longer recovery.     The procedure entails very close operative proximity to the bladder and ureters.  There is a risk of injury to these " structures.  It is usual practice to inspect the bladder and insure functioning ureters at the conclusion of the procedure using cystoscopy (camera in the bladder).     In addition to routine postoperative instructions provided, all patients are advised that they MUST avoid vaginal penetration for 6-8 weeks postoperative until the upper vaginal cuff is inspected for proper healing.  There is a rare incidence of vaginal cuff separation that can require emergent intervention.        Principal Problem:    Abnormal uterine bleeding (AUB)    Henri Painter MD  06/13/18  6:26 PM      Patient Care Team:  No Known Provider as PCP - General

## 2018-06-14 ENCOUNTER — ANESTHESIA (OUTPATIENT)
Dept: PERIOP | Facility: HOSPITAL | Age: 43
End: 2018-06-14

## 2018-06-14 ENCOUNTER — ANESTHESIA EVENT (OUTPATIENT)
Dept: PERIOP | Facility: HOSPITAL | Age: 43
End: 2018-06-14

## 2018-06-14 ENCOUNTER — HOSPITAL ENCOUNTER (OUTPATIENT)
Facility: HOSPITAL | Age: 43
Discharge: HOME | End: 2018-06-15
Attending: OBSTETRICS & GYNECOLOGY | Admitting: OBSTETRICS & GYNECOLOGY

## 2018-06-14 DIAGNOSIS — Z90.710 S/P LAPAROSCOPIC HYSTERECTOMY: Primary | ICD-10-CM

## 2018-06-14 DIAGNOSIS — N93.9 ABNORMAL UTERINE BLEEDING (AUB): ICD-10-CM

## 2018-06-14 PROCEDURE — 25010000002 FUROSEMIDE PER 20 MG: Performed by: NURSE ANESTHETIST, CERTIFIED REGISTERED

## 2018-06-14 PROCEDURE — 25010000002 ONDANSETRON PER 1 MG: Performed by: NURSE ANESTHETIST, CERTIFIED REGISTERED

## 2018-06-14 PROCEDURE — 25010000002 DEXAMETHASONE PER 1 MG: Performed by: NURSE ANESTHETIST, CERTIFIED REGISTERED

## 2018-06-14 PROCEDURE — 25010000002 DIPHENHYDRAMINE PER 50 MG: Performed by: NURSE ANESTHETIST, CERTIFIED REGISTERED

## 2018-06-14 PROCEDURE — 88307 TISSUE EXAM BY PATHOLOGIST: CPT | Performed by: OBSTETRICS & GYNECOLOGY

## 2018-06-14 PROCEDURE — 25810000003 POTASSIUM CHLORIDE PER 2 MEQ: Performed by: OBSTETRICS & GYNECOLOGY

## 2018-06-14 PROCEDURE — 58571 TLH W/T/O 250 G OR LESS: CPT | Performed by: OBSTETRICS & GYNECOLOGY

## 2018-06-14 PROCEDURE — 25010000002 PROPOFOL 10 MG/ML EMULSION: Performed by: NURSE ANESTHETIST, CERTIFIED REGISTERED

## 2018-06-14 PROCEDURE — 25010000002 KETOROLAC TROMETHAMINE PER 15 MG: Performed by: NURSE ANESTHETIST, CERTIFIED REGISTERED

## 2018-06-14 PROCEDURE — 25010000002 FENTANYL CITRATE (PF) 100 MCG/2ML SOLUTION: Performed by: NURSE ANESTHETIST, CERTIFIED REGISTERED

## 2018-06-14 PROCEDURE — 25010000002 MIDAZOLAM PER 1 MG: Performed by: NURSE ANESTHETIST, CERTIFIED REGISTERED

## 2018-06-14 PROCEDURE — 25010000003 CEFAZOLIN IN DEXTROSE 2-4 GM/100ML-% SOLUTION: Performed by: OBSTETRICS & GYNECOLOGY

## 2018-06-14 PROCEDURE — 25010000002 PHENYLEPHRINE PER 1 ML: Performed by: NURSE ANESTHETIST, CERTIFIED REGISTERED

## 2018-06-14 PROCEDURE — 25010000002 HYDROMORPHONE PER 4 MG: Performed by: NURSE ANESTHETIST, CERTIFIED REGISTERED

## 2018-06-14 RX ORDER — HYDROMORPHONE HYDROCHLORIDE 1 MG/ML
0.5 INJECTION, SOLUTION INTRAMUSCULAR; INTRAVENOUS; SUBCUTANEOUS
Status: DISCONTINUED | OUTPATIENT
Start: 2018-06-14 | End: 2018-06-14 | Stop reason: HOSPADM

## 2018-06-14 RX ORDER — ALBUTEROL SULFATE 2.5 MG/3ML
2.5 SOLUTION RESPIRATORY (INHALATION) EVERY 4 HOURS PRN
Status: DISCONTINUED | OUTPATIENT
Start: 2018-06-14 | End: 2018-06-15 | Stop reason: HOSPADM

## 2018-06-14 RX ORDER — PHENAZOPYRIDINE HYDROCHLORIDE 200 MG/1
200 TABLET, FILM COATED ORAL ONCE
Status: COMPLETED | OUTPATIENT
Start: 2018-06-14 | End: 2018-06-14

## 2018-06-14 RX ORDER — PROMETHAZINE HYDROCHLORIDE 25 MG/1
25 SUPPOSITORY RECTAL ONCE AS NEEDED
Status: DISCONTINUED | OUTPATIENT
Start: 2018-06-14 | End: 2018-06-14 | Stop reason: HOSPADM

## 2018-06-14 RX ORDER — BISACODYL 10 MG
10 SUPPOSITORY, RECTAL RECTAL DAILY PRN
Status: DISCONTINUED | OUTPATIENT
Start: 2018-06-14 | End: 2018-06-15 | Stop reason: HOSPADM

## 2018-06-14 RX ORDER — ALBUTEROL SULFATE 90 UG/1
2 AEROSOL, METERED RESPIRATORY (INHALATION) EVERY 4 HOURS PRN
Status: DISCONTINUED | OUTPATIENT
Start: 2018-06-14 | End: 2018-06-14 | Stop reason: CLARIF

## 2018-06-14 RX ORDER — LIDOCAINE HYDROCHLORIDE 10 MG/ML
0.5 INJECTION, SOLUTION EPIDURAL; INFILTRATION; INTRACAUDAL; PERINEURAL ONCE AS NEEDED
Status: DISCONTINUED | OUTPATIENT
Start: 2018-06-14 | End: 2018-06-14 | Stop reason: HOSPADM

## 2018-06-14 RX ORDER — ZOLPIDEM TARTRATE 5 MG/1
5 TABLET ORAL NIGHTLY PRN
Status: DISCONTINUED | OUTPATIENT
Start: 2018-06-14 | End: 2018-06-15 | Stop reason: HOSPADM

## 2018-06-14 RX ORDER — PROMETHAZINE HYDROCHLORIDE 25 MG/1
12.5 TABLET ORAL ONCE AS NEEDED
Status: DISCONTINUED | OUTPATIENT
Start: 2018-06-14 | End: 2018-06-14 | Stop reason: HOSPADM

## 2018-06-14 RX ORDER — ONDANSETRON 4 MG/1
4 TABLET, ORALLY DISINTEGRATING ORAL EVERY 6 HOURS PRN
Status: DISCONTINUED | OUTPATIENT
Start: 2018-06-14 | End: 2018-06-15 | Stop reason: HOSPADM

## 2018-06-14 RX ORDER — SODIUM CHLORIDE, SODIUM LACTATE, POTASSIUM CHLORIDE, CALCIUM CHLORIDE 600; 310; 30; 20 MG/100ML; MG/100ML; MG/100ML; MG/100ML
9 INJECTION, SOLUTION INTRAVENOUS CONTINUOUS
Status: DISCONTINUED | OUTPATIENT
Start: 2018-06-14 | End: 2018-06-15 | Stop reason: HOSPADM

## 2018-06-14 RX ORDER — DIPHENHYDRAMINE HYDROCHLORIDE 50 MG/ML
12.5 INJECTION INTRAMUSCULAR; INTRAVENOUS
Status: DISCONTINUED | OUTPATIENT
Start: 2018-06-14 | End: 2018-06-14 | Stop reason: HOSPADM

## 2018-06-14 RX ORDER — MUSCLE RUB CREAM 100; 150 MG/G; MG/G
CREAM TOPICAL
Status: DISCONTINUED | OUTPATIENT
Start: 2018-06-14 | End: 2018-06-15 | Stop reason: HOSPADM

## 2018-06-14 RX ORDER — SODIUM CHLORIDE 0.9 % (FLUSH) 0.9 %
1-10 SYRINGE (ML) INJECTION AS NEEDED
Status: DISCONTINUED | OUTPATIENT
Start: 2018-06-14 | End: 2018-06-14 | Stop reason: HOSPADM

## 2018-06-14 RX ORDER — SODIUM CHLORIDE AND POTASSIUM CHLORIDE 150; 450 MG/100ML; MG/100ML
100 INJECTION, SOLUTION INTRAVENOUS CONTINUOUS
Status: DISCONTINUED | OUTPATIENT
Start: 2018-06-14 | End: 2018-06-15 | Stop reason: HOSPADM

## 2018-06-14 RX ORDER — LIDOCAINE HYDROCHLORIDE 20 MG/ML
INJECTION, SOLUTION INFILTRATION; PERINEURAL AS NEEDED
Status: DISCONTINUED | OUTPATIENT
Start: 2018-06-14 | End: 2018-06-14 | Stop reason: SURG

## 2018-06-14 RX ORDER — FAMOTIDINE 10 MG/ML
20 INJECTION, SOLUTION INTRAVENOUS ONCE
Status: COMPLETED | OUTPATIENT
Start: 2018-06-14 | End: 2018-06-14

## 2018-06-14 RX ORDER — DEXAMETHASONE SODIUM PHOSPHATE 10 MG/ML
INJECTION INTRAMUSCULAR; INTRAVENOUS AS NEEDED
Status: DISCONTINUED | OUTPATIENT
Start: 2018-06-14 | End: 2018-06-14 | Stop reason: SURG

## 2018-06-14 RX ORDER — FENTANYL CITRATE 50 UG/ML
50 INJECTION, SOLUTION INTRAMUSCULAR; INTRAVENOUS
Status: DISCONTINUED | OUTPATIENT
Start: 2018-06-14 | End: 2018-06-14 | Stop reason: HOSPADM

## 2018-06-14 RX ORDER — FLUMAZENIL 0.1 MG/ML
0.2 INJECTION INTRAVENOUS AS NEEDED
Status: DISCONTINUED | OUTPATIENT
Start: 2018-06-14 | End: 2018-06-14 | Stop reason: HOSPADM

## 2018-06-14 RX ORDER — NICOTINE 21 MG/24HR
1 PATCH, TRANSDERMAL 24 HOURS TRANSDERMAL EVERY 24 HOURS
Status: DISCONTINUED | OUTPATIENT
Start: 2018-06-14 | End: 2018-06-15 | Stop reason: HOSPADM

## 2018-06-14 RX ORDER — ALUMINA, MAGNESIA, AND SIMETHICONE 2400; 2400; 240 MG/30ML; MG/30ML; MG/30ML
15 SUSPENSION ORAL EVERY 6 HOURS
Status: DISCONTINUED | OUTPATIENT
Start: 2018-06-14 | End: 2018-06-15 | Stop reason: HOSPADM

## 2018-06-14 RX ORDER — EPHEDRINE SULFATE 50 MG/ML
5 INJECTION, SOLUTION INTRAVENOUS ONCE AS NEEDED
Status: DISCONTINUED | OUTPATIENT
Start: 2018-06-14 | End: 2018-06-14 | Stop reason: HOSPADM

## 2018-06-14 RX ORDER — LABETALOL HYDROCHLORIDE 5 MG/ML
5 INJECTION, SOLUTION INTRAVENOUS
Status: DISCONTINUED | OUTPATIENT
Start: 2018-06-14 | End: 2018-06-14 | Stop reason: HOSPADM

## 2018-06-14 RX ORDER — SIMETHICONE 80 MG
80 TABLET,CHEWABLE ORAL 4 TIMES DAILY PRN
Status: DISCONTINUED | OUTPATIENT
Start: 2018-06-14 | End: 2018-06-15 | Stop reason: HOSPADM

## 2018-06-14 RX ORDER — PROPOFOL 10 MG/ML
VIAL (ML) INTRAVENOUS AS NEEDED
Status: DISCONTINUED | OUTPATIENT
Start: 2018-06-14 | End: 2018-06-14 | Stop reason: SURG

## 2018-06-14 RX ORDER — DOCUSATE SODIUM 100 MG/1
100 CAPSULE, LIQUID FILLED ORAL 2 TIMES DAILY PRN
Status: DISCONTINUED | OUTPATIENT
Start: 2018-06-14 | End: 2018-06-15 | Stop reason: HOSPADM

## 2018-06-14 RX ORDER — PROMETHAZINE HYDROCHLORIDE 25 MG/ML
12.5 INJECTION, SOLUTION INTRAMUSCULAR; INTRAVENOUS ONCE AS NEEDED
Status: DISCONTINUED | OUTPATIENT
Start: 2018-06-14 | End: 2018-06-14 | Stop reason: HOSPADM

## 2018-06-14 RX ORDER — SODIUM CHLORIDE 9 MG/ML
INJECTION, SOLUTION INTRAVENOUS AS NEEDED
Status: DISCONTINUED | OUTPATIENT
Start: 2018-06-14 | End: 2018-06-14 | Stop reason: HOSPADM

## 2018-06-14 RX ORDER — NALOXONE HCL 0.4 MG/ML
0.2 VIAL (ML) INJECTION AS NEEDED
Status: DISCONTINUED | OUTPATIENT
Start: 2018-06-14 | End: 2018-06-14 | Stop reason: HOSPADM

## 2018-06-14 RX ORDER — FUROSEMIDE 10 MG/ML
INJECTION INTRAMUSCULAR; INTRAVENOUS AS NEEDED
Status: DISCONTINUED | OUTPATIENT
Start: 2018-06-14 | End: 2018-06-14 | Stop reason: SURG

## 2018-06-14 RX ORDER — OXYCODONE AND ACETAMINOPHEN 7.5; 325 MG/1; MG/1
1 TABLET ORAL ONCE AS NEEDED
Status: DISCONTINUED | OUTPATIENT
Start: 2018-06-14 | End: 2018-06-14 | Stop reason: HOSPADM

## 2018-06-14 RX ORDER — CEFAZOLIN SODIUM 2 G/100ML
2 INJECTION, SOLUTION INTRAVENOUS ONCE
Status: COMPLETED | OUTPATIENT
Start: 2018-06-14 | End: 2018-06-14

## 2018-06-14 RX ORDER — MAGNESIUM HYDROXIDE 1200 MG/15ML
LIQUID ORAL AS NEEDED
Status: DISCONTINUED | OUTPATIENT
Start: 2018-06-14 | End: 2018-06-14 | Stop reason: HOSPADM

## 2018-06-14 RX ORDER — KETOROLAC TROMETHAMINE 30 MG/ML
INJECTION, SOLUTION INTRAMUSCULAR; INTRAVENOUS AS NEEDED
Status: DISCONTINUED | OUTPATIENT
Start: 2018-06-14 | End: 2018-06-14 | Stop reason: SURG

## 2018-06-14 RX ORDER — CALCIUM CARBONATE 200(500)MG
2 TABLET,CHEWABLE ORAL 3 TIMES DAILY PRN
Status: DISCONTINUED | OUTPATIENT
Start: 2018-06-14 | End: 2018-06-15 | Stop reason: HOSPADM

## 2018-06-14 RX ORDER — ONDANSETRON 2 MG/ML
4 INJECTION INTRAMUSCULAR; INTRAVENOUS ONCE AS NEEDED
Status: DISCONTINUED | OUTPATIENT
Start: 2018-06-14 | End: 2018-06-14 | Stop reason: HOSPADM

## 2018-06-14 RX ORDER — FAMOTIDINE 20 MG/1
20 TABLET, FILM COATED ORAL NIGHTLY
Status: DISCONTINUED | OUTPATIENT
Start: 2018-06-14 | End: 2018-06-15 | Stop reason: HOSPADM

## 2018-06-14 RX ORDER — ESMOLOL HYDROCHLORIDE 10 MG/ML
INJECTION INTRAVENOUS AS NEEDED
Status: DISCONTINUED | OUTPATIENT
Start: 2018-06-14 | End: 2018-06-14 | Stop reason: SURG

## 2018-06-14 RX ORDER — HYDROMORPHONE HYDROCHLORIDE 1 MG/ML
0.5 INJECTION, SOLUTION INTRAMUSCULAR; INTRAVENOUS; SUBCUTANEOUS
Status: DISCONTINUED | OUTPATIENT
Start: 2018-06-14 | End: 2018-06-15 | Stop reason: HOSPADM

## 2018-06-14 RX ORDER — MIDAZOLAM HYDROCHLORIDE 1 MG/ML
INJECTION INTRAMUSCULAR; INTRAVENOUS AS NEEDED
Status: DISCONTINUED | OUTPATIENT
Start: 2018-06-14 | End: 2018-06-14 | Stop reason: SURG

## 2018-06-14 RX ORDER — ONDANSETRON 4 MG/1
4 TABLET, FILM COATED ORAL EVERY 6 HOURS PRN
Status: DISCONTINUED | OUTPATIENT
Start: 2018-06-14 | End: 2018-06-15 | Stop reason: HOSPADM

## 2018-06-14 RX ORDER — ROCURONIUM BROMIDE 10 MG/ML
INJECTION, SOLUTION INTRAVENOUS AS NEEDED
Status: DISCONTINUED | OUTPATIENT
Start: 2018-06-14 | End: 2018-06-14 | Stop reason: SURG

## 2018-06-14 RX ORDER — ONDANSETRON 2 MG/ML
4 INJECTION INTRAMUSCULAR; INTRAVENOUS EVERY 6 HOURS PRN
Status: DISCONTINUED | OUTPATIENT
Start: 2018-06-14 | End: 2018-06-15 | Stop reason: HOSPADM

## 2018-06-14 RX ORDER — NALOXONE HCL 0.4 MG/ML
0.1 VIAL (ML) INJECTION
Status: DISCONTINUED | OUTPATIENT
Start: 2018-06-14 | End: 2018-06-15 | Stop reason: HOSPADM

## 2018-06-14 RX ORDER — NALOXONE HCL 0.4 MG/ML
0.4 VIAL (ML) INJECTION
Status: DISCONTINUED | OUTPATIENT
Start: 2018-06-14 | End: 2018-06-15 | Stop reason: HOSPADM

## 2018-06-14 RX ORDER — OXYCODONE AND ACETAMINOPHEN 7.5; 325 MG/1; MG/1
2 TABLET ORAL EVERY 4 HOURS PRN
Status: DISCONTINUED | OUTPATIENT
Start: 2018-06-14 | End: 2018-06-15 | Stop reason: HOSPADM

## 2018-06-14 RX ORDER — HYDROMORPHONE HCL 110MG/55ML
PATIENT CONTROLLED ANALGESIA SYRINGE INTRAVENOUS AS NEEDED
Status: DISCONTINUED | OUTPATIENT
Start: 2018-06-14 | End: 2018-06-14 | Stop reason: SURG

## 2018-06-14 RX ORDER — OXYCODONE HYDROCHLORIDE AND ACETAMINOPHEN 5; 325 MG/1; MG/1
1 TABLET ORAL EVERY 4 HOURS PRN
Status: DISCONTINUED | OUTPATIENT
Start: 2018-06-14 | End: 2018-06-15 | Stop reason: HOSPADM

## 2018-06-14 RX ORDER — HYDROCODONE BITARTRATE AND ACETAMINOPHEN 7.5; 325 MG/1; MG/1
1 TABLET ORAL ONCE AS NEEDED
Status: DISCONTINUED | OUTPATIENT
Start: 2018-06-14 | End: 2018-06-14 | Stop reason: HOSPADM

## 2018-06-14 RX ORDER — ONDANSETRON 2 MG/ML
INJECTION INTRAMUSCULAR; INTRAVENOUS AS NEEDED
Status: DISCONTINUED | OUTPATIENT
Start: 2018-06-14 | End: 2018-06-14 | Stop reason: SURG

## 2018-06-14 RX ORDER — BUPIVACAINE HYDROCHLORIDE AND EPINEPHRINE 5; 5 MG/ML; UG/ML
INJECTION, SOLUTION PERINEURAL AS NEEDED
Status: DISCONTINUED | OUTPATIENT
Start: 2018-06-14 | End: 2018-06-14 | Stop reason: HOSPADM

## 2018-06-14 RX ORDER — FENTANYL CITRATE 50 UG/ML
INJECTION, SOLUTION INTRAMUSCULAR; INTRAVENOUS AS NEEDED
Status: DISCONTINUED | OUTPATIENT
Start: 2018-06-14 | End: 2018-06-14 | Stop reason: SURG

## 2018-06-14 RX ORDER — PROMETHAZINE HYDROCHLORIDE 25 MG/1
25 TABLET ORAL ONCE AS NEEDED
Status: DISCONTINUED | OUTPATIENT
Start: 2018-06-14 | End: 2018-06-14 | Stop reason: HOSPADM

## 2018-06-14 RX ORDER — SENNA AND DOCUSATE SODIUM 50; 8.6 MG/1; MG/1
2 TABLET, FILM COATED ORAL NIGHTLY
Status: DISCONTINUED | OUTPATIENT
Start: 2018-06-14 | End: 2018-06-15 | Stop reason: HOSPADM

## 2018-06-14 RX ORDER — NAPROXEN 250 MG/1
250 TABLET ORAL 2 TIMES DAILY PRN
Status: DISCONTINUED | OUTPATIENT
Start: 2018-06-14 | End: 2018-06-15 | Stop reason: HOSPADM

## 2018-06-14 RX ORDER — BISACODYL 5 MG/1
10 TABLET, DELAYED RELEASE ORAL DAILY PRN
Status: DISCONTINUED | OUTPATIENT
Start: 2018-06-14 | End: 2018-06-15 | Stop reason: HOSPADM

## 2018-06-14 RX ADMIN — FENTANYL CITRATE 50 MCG: 50 INJECTION INTRAMUSCULAR; INTRAVENOUS at 09:11

## 2018-06-14 RX ADMIN — Medication 2 MG: at 07:18

## 2018-06-14 RX ADMIN — FENTANYL CITRATE 25 MCG: 50 INJECTION INTRAMUSCULAR; INTRAVENOUS at 07:50

## 2018-06-14 RX ADMIN — DOCUSATE SODIUM -SENNOSIDES 2 TABLET: 50; 8.6 TABLET, COATED ORAL at 20:24

## 2018-06-14 RX ADMIN — CEFAZOLIN SODIUM 2 G: 2 INJECTION, SOLUTION INTRAVENOUS at 07:25

## 2018-06-14 RX ADMIN — SODIUM CHLORIDE, POTASSIUM CHLORIDE, SODIUM LACTATE AND CALCIUM CHLORIDE: 600; 310; 30; 20 INJECTION, SOLUTION INTRAVENOUS at 07:18

## 2018-06-14 RX ADMIN — PROPOFOL 180 MG: 10 INJECTION, EMULSION INTRAVENOUS at 07:22

## 2018-06-14 RX ADMIN — ROCURONIUM BROMIDE 40 MG: 10 INJECTION INTRAVENOUS at 07:22

## 2018-06-14 RX ADMIN — SODIUM CHLORIDE, POTASSIUM CHLORIDE, SODIUM LACTATE AND CALCIUM CHLORIDE 9 ML/HR: 600; 310; 30; 20 INJECTION, SOLUTION INTRAVENOUS at 06:33

## 2018-06-14 RX ADMIN — FENTANYL CITRATE 25 MCG: 50 INJECTION INTRAMUSCULAR; INTRAVENOUS at 08:19

## 2018-06-14 RX ADMIN — DEXAMETHASONE SODIUM PHOSPHATE 10 MG: 10 INJECTION INTRAMUSCULAR; INTRAVENOUS at 07:30

## 2018-06-14 RX ADMIN — OXYCODONE HYDROCHLORIDE AND ACETAMINOPHEN 0.5 TABLET: 5; 325 TABLET ORAL at 20:24

## 2018-06-14 RX ADMIN — FENTANYL CITRATE 50 MCG: 50 INJECTION INTRAMUSCULAR; INTRAVENOUS at 07:31

## 2018-06-14 RX ADMIN — FAMOTIDINE 20 MG: 20 TABLET, FILM COATED ORAL at 20:24

## 2018-06-14 RX ADMIN — OXYCODONE HYDROCHLORIDE AND ACETAMINOPHEN 0.5 TABLET: 5; 325 TABLET ORAL at 19:28

## 2018-06-14 RX ADMIN — FAMOTIDINE 20 MG: 10 INJECTION, SOLUTION INTRAVENOUS at 06:40

## 2018-06-14 RX ADMIN — SUGAMMADEX 200 MG: 100 INJECTION, SOLUTION INTRAVENOUS at 08:46

## 2018-06-14 RX ADMIN — KETOROLAC TROMETHAMINE 30 MG: 30 INJECTION, SOLUTION INTRAMUSCULAR; INTRAVENOUS at 08:45

## 2018-06-14 RX ADMIN — PHENAZOPYRIDINE HYDROCHLORIDE 200 MG: 200 TABLET, FILM COATED ORAL at 06:34

## 2018-06-14 RX ADMIN — DIPHENHYDRAMINE HYDROCHLORIDE 12.5 MG: 50 INJECTION INTRAMUSCULAR; INTRAVENOUS at 09:34

## 2018-06-14 RX ADMIN — PHENYLEPHRINE HYDROCHLORIDE 150 MCG: 10 INJECTION INTRAVENOUS at 07:53

## 2018-06-14 RX ADMIN — ONDANSETRON 4 MG: 2 INJECTION INTRAMUSCULAR; INTRAVENOUS at 08:45

## 2018-06-14 RX ADMIN — SODIUM CHLORIDE, POTASSIUM CHLORIDE, SODIUM LACTATE AND CALCIUM CHLORIDE 9 ML/HR: 600; 310; 30; 20 INJECTION, SOLUTION INTRAVENOUS at 09:11

## 2018-06-14 RX ADMIN — POTASSIUM CHLORIDE AND SODIUM CHLORIDE 100 ML/HR: 450; 150 INJECTION, SOLUTION INTRAVENOUS at 14:20

## 2018-06-14 RX ADMIN — MENTHOL, METHYL SALICYLATE: 10; 15 CREAM TOPICAL at 14:00

## 2018-06-14 RX ADMIN — ESMOLOL HYDROCHLORIDE 20 MG: 10 INJECTION, SOLUTION INTRAVENOUS at 07:26

## 2018-06-14 RX ADMIN — HYDROMORPHONE HYDROCHLORIDE 0.5 MG: 2 INJECTION INTRAMUSCULAR; INTRAVENOUS; SUBCUTANEOUS at 08:59

## 2018-06-14 RX ADMIN — HYDROMORPHONE HYDROCHLORIDE 0.5 MG: 2 INJECTION INTRAMUSCULAR; INTRAVENOUS; SUBCUTANEOUS at 08:56

## 2018-06-14 RX ADMIN — OXYCODONE HYDROCHLORIDE AND ACETAMINOPHEN 0.5 TABLET: 5; 325 TABLET ORAL at 11:35

## 2018-06-14 RX ADMIN — PHENYLEPHRINE HYDROCHLORIDE 100 MCG: 10 INJECTION INTRAVENOUS at 07:40

## 2018-06-14 RX ADMIN — LIDOCAINE HYDROCHLORIDE 50 MG: 20 INJECTION, SOLUTION INFILTRATION; PERINEURAL at 07:22

## 2018-06-14 RX ADMIN — OXYCODONE HYDROCHLORIDE AND ACETAMINOPHEN 0.5 TABLET: 5; 325 TABLET ORAL at 16:15

## 2018-06-14 RX ADMIN — FENTANYL CITRATE 50 MCG: 50 INJECTION INTRAMUSCULAR; INTRAVENOUS at 09:26

## 2018-06-14 RX ADMIN — FENTANYL CITRATE 50 MCG: 50 INJECTION INTRAMUSCULAR; INTRAVENOUS at 07:19

## 2018-06-14 RX ADMIN — FENTANYL CITRATE 50 MCG: 50 INJECTION INTRAMUSCULAR; INTRAVENOUS at 08:30

## 2018-06-14 RX ADMIN — FUROSEMIDE 10 MG: 10 INJECTION, SOLUTION INTRAMUSCULAR; INTRAVENOUS at 08:33

## 2018-06-14 NOTE — ANESTHESIA POSTPROCEDURE EVALUATION
"Patient: Kathleen Villanueva    Procedure Summary     Date:  06/14/18 Room / Location:  [facility] OR  /  [facility]    Anesthesia Start:  0718 Anesthesia Stop:  0906    Procedure:  TOTAL LAPAROSCOPIC HYSTERECTOMY BILATERAL SALPINGECTOMY  CYSTOSCOPY (Bilateral Abdomen) Diagnosis:       Abnormal uterine bleeding (AUB)      (Abnormal uterine bleeding (AUB) [N93.9])    Surgeon:  Henri Painter MD Provider:  Magi Castro MD    Anesthesia Type:  general ASA Status:  2          Anesthesia Type: general  Last vitals  BP   116/79 (06/14/18 0945)   Temp   36.4 °C (97.5 °F) (06/14/18 0902)   Pulse   90 (06/14/18 0945)   Resp   16 (06/14/18 0945)     SpO2   99 % (06/14/18 0945)     Post Anesthesia Care and Evaluation    Patient location during evaluation: PACU  Patient participation: complete - patient participated  Level of consciousness: awake and alert  Pain management: adequate  Airway patency: patent  Anesthetic complications: No anesthetic complications    Cardiovascular status: acceptable  Respiratory status: acceptable  Hydration status: acceptable    Comments: /79   Pulse 90   Temp 36.4 °C (97.5 °F) (Oral)   Resp 16   Ht 165.1 cm (65\")   Wt 58.8 kg (129 lb 10.1 oz)   LMP 06/12/2018   SpO2 99%   BMI 21.57 kg/m²         "

## 2018-06-14 NOTE — OP NOTE
Operative Note    Date of Procedure: 6/14/2018    Patient:  Kathleen Villanueva   MR#: [id number]    PREOPERATIVE DIAGNOSIS:   Abnormal uterine bleeding (AUB) [N93.9]  Intramural uterine fibroid  Dysmenorrhea    Post-Op Diagnosis Codes:     * Abnormal uterine bleeding (AUB) [N93.9]  Intramural uterine fibroid  Dysmenorrhea    PROCEDURES PERFORMED:    1. Total laparoscopic hysterectomy.    2. Bilateral salpingectomy.  3. Cystoscopy.      SURGEON: Henri Painter MD    ASSISTANT:  Sydney Umana MD    ANESTHESIA: General.      ESTIMATED BLOOD LOSS: 50 mL.      SPECIMENS:     Order Name Source Comment Collection Info Order Time   TISSUE PATHOLOGY EXAM Uterus, Cervix, Bilateral Fallopian Tubes   Collected By: Henri Painter MD 6/14/2018  8:26 AM       COMPLICATIONS: None.      INDICATIONS: See the written history and physical.      DESCRIPTION OF PROCEDURE: The patient was taken to the operating room and placed in the supine position. General anesthesia was administered. The patient's legs were positioned in Ede stirrups and her arms were tucked at her side. She was prepped and draped in the usual sterile fashion.     Attention was turned vaginally. A weighted speculum was inserted. The cervix was grasped anteriorly with a single-tooth tenaculum. Anchoring stitches were placed in the cervix anteriorly and posteriorly with 0 Vicryl. The cervix was dilated to 12-Sudanese. Uterus sounds to 6-8 cm. The ALEX device was then inserted into the uterus using the anchoring stitches threaded through the cup to seat the cervix within the ALEX cup. The intrauterine balloon was inflated and attention was turned abdominally.     A small vertical infraumbilical skin incision was made with the knife. The Veress needle was placed through the incision. The abdomen is insufflated with CO2 gas with normal pressures noted while insufflating. When an adequate pneumoperitoneum was achieved, a 5 mm bladeless trocar was inserted through the  umbilical incision and the scope immediately thereafter. An initial survey of the abdomen provides no evidence of pathology with mild adhesive disease. The patient was placed in Trendelenburg. Accessory ports are then inserted under direct visualization after appropriate skin incisions were made in the left and right lower quadrant, 5 and 10 mm. The incision sites are made after transilluminating the abdomen to avoid the epigastric vessels.  Both trocars are atraumatic and inserted under direct visualization. Bowel was than manipulated cephalad to expose the pelvis.  Bilaterally, the length of the tubal mesosalpinx is camped, cauterized and transected with the harmonic scalpel.  The fallopian tube is amputated at the cornua.   Attention was turned to the left utero-ovarian ligament.  The utero-ovarain ligament is then clamped, cauterized, and transected in serial bites along its length up to the level of the round ligament. The round ligament is clamped, cauterized, and transected in serial bites down to a level just above the uterine vessels on the left. The anterior reflection of the peritoneum was then opened and incised with the Harmonic scalpel to create a bladder flap. Attention is turned to the contralateral side and in an identical fashion, utero-ovarian ligament was clamped, cauterized, and transected along its length as is the round ligament down to the level just above the uterine vessels. The peritoneal incision is connected with the contralateral side and then the bladder is dissected inferiorly and the dissection of the bladder flap was completed to expose the ring of the ALEX device. The uterine vessels on the right were then clamped, cauterized, and transected using vascular mode of the Harmonic scalpel in 3 serial bites. Identical procedures repeated on the contralateral side.     Anterior colpotomy was performed after the vaginal occluder balloon was inflated. The colpotomy is carried out  circumferentially with the active blade of the Harmonic scalpel staying on the upper region of the ALEX ring. When the transsection is completed, the specimen is then delivered vaginally without any difficulty. The operative site is copiously irrigated, inspected, and noted to be hemostatic.  The vagina is occluded with an asepto-bulb to maintain the pneumoperitoneum.  The cuff was then closed with 2-0 V-Loc suture using the Ethicon Endo Stitch device. The cuff is closed posterior to anterior and left to right in a running fashion. Upon reaching the right margin, the stitch is threaded back 2 bites to anchor the leading stitch. Careful attention is made during the closure to avoid the bladder flap with the placement of the anterior stitch and the colon posteriorly. The cuff is noted to be hemostatic upon closure.     Attention is turned again vaginally. The Stoll catheter is removed. The bladder is instilled with approximately 200 mL of sterile water. The 5 mm laparoscope is then inserted. The bladder dome appears healthy with no evidence of injury. No pathology is observed. Both ureters are identified and noted to flux urine stained with Pyridium given preoperatively bilaterally. Stoll catheter was replaced. Cuff is reinspected and noted to remain hemostatic as are the pelvic sidewall pedicles.  There was a minimal amount of ooze from the right uterosacral ligament noted after irrigation.  The bleeding appeared to alex but for good measure Lohse was applied to the area.    The abdomen was thereafter deflated. Valsalva is given by anesthesia to displace all remaining air. All ports are removed. Skin incisions are closed with 2-0 Vicryl in a subcuticular fashion. The sites are injected with 0.5% Marcaine. The patient is awakened and taken to the recovery room in stable condition.      SUMMARY: Uncomplicated total laparoscopic hysterectomy with bilateral salpingectomy.      Henri Painter MD  6/14/2018  9:02  AM

## 2018-06-14 NOTE — ANESTHESIA PREPROCEDURE EVALUATION
Anesthesia Evaluation     Patient summary reviewed and Nursing notes reviewed   History of anesthetic complications: mother slow to wake in 70s.  NPO Solid Status: > 8 hours  NPO Liquid Status: > 2 hours           Airway   Mallampati: II  TM distance: >3 FB  Neck ROM: full  No difficulty expected  Dental    (+) upper dentures    Pulmonary - normal exam   (+) asthma (well controlled), sleep apnea (untreated),   Cardiovascular - normal exam    ECG reviewed    (+) hyperlipidemia,       Neuro/Psych  (+) headaches, psychiatric history Depression,     GI/Hepatic/Renal/Endo    (+)   hyperthyroidism    Musculoskeletal     Abdominal    Substance History      OB/GYN          Other   (+) arthritis                     Anesthesia Plan    ASA 2     general     intravenous induction   Anesthetic plan and risks discussed with patient.

## 2018-06-14 NOTE — ANESTHESIA PROCEDURE NOTES
Airway  Urgency: elective    Airway not difficult    General Information and Staff    Patient location during procedure: OR  Anesthesiologist: SARAI MCDONALD  CRNA: YASMIN ESPINOSA    Indications and Patient Condition  Indications for airway management: airway protection    Preoxygenated: yes  Mask difficulty assessment: 1 - vent by mask    Final Airway Details  Final airway type: endotracheal airway      Successful airway: ETT  Cuffed: yes   Successful intubation technique: direct laryngoscopy  Facilitating devices/methods: intubating stylet  Endotracheal tube insertion site: oral  Blade: Haley  Blade size: #3  ETT size: 7.0 mm  Cormack-Lehane Classification: grade I - full view of glottis  Placement verified by: chest auscultation and capnometry   Measured from: lips  Number of attempts at approach: 1    Additional Comments  Preoxygenated with 100% FiO2. Smooth IV induction. Atraumatic insertion. No change to dentition or soft tissue.

## 2018-06-15 VITALS
HEIGHT: 65 IN | OXYGEN SATURATION: 97 % | SYSTOLIC BLOOD PRESSURE: 110 MMHG | BODY MASS INDEX: 21.6 KG/M2 | WEIGHT: 129.63 LBS | DIASTOLIC BLOOD PRESSURE: 66 MMHG | TEMPERATURE: 98.4 F | HEART RATE: 69 BPM | RESPIRATION RATE: 16 BRPM

## 2018-06-15 LAB
ANION GAP SERPL CALCULATED.3IONS-SCNC: 11.7 MMOL/L
BASOPHILS # BLD AUTO: 0.03 10*3/MM3 (ref 0–0.2)
BASOPHILS NFR BLD AUTO: 0.1 % (ref 0–1.5)
BUN BLD-MCNC: 13 MG/DL (ref 6–20)
BUN/CREAT SERPL: 16.3 (ref 7–25)
CALCIUM SPEC-SCNC: 9.4 MG/DL (ref 8.6–10.5)
CHLORIDE SERPL-SCNC: 101 MMOL/L (ref 98–107)
CO2 SERPL-SCNC: 24.3 MMOL/L (ref 22–29)
CREAT BLD-MCNC: 0.8 MG/DL (ref 0.57–1)
DEPRECATED RDW RBC AUTO: 45.2 FL (ref 37–54)
EOSINOPHIL # BLD AUTO: 0.06 10*3/MM3 (ref 0–0.7)
EOSINOPHIL NFR BLD AUTO: 0.2 % (ref 0.3–6.2)
ERYTHROCYTE [DISTWIDTH] IN BLOOD BY AUTOMATED COUNT: 13.5 % (ref 11.7–13)
GFR SERPL CREATININE-BSD FRML MDRD: 78 ML/MIN/1.73
GLUCOSE BLD-MCNC: 129 MG/DL (ref 65–99)
HCT VFR BLD AUTO: 41.4 % (ref 35.6–45.5)
HGB BLD-MCNC: 13.5 G/DL (ref 11.9–15.5)
IMM GRANULOCYTES # BLD: 0.12 10*3/MM3 (ref 0–0.03)
IMM GRANULOCYTES NFR BLD: 0.5 % (ref 0–0.5)
LYMPHOCYTES # BLD AUTO: 2.59 10*3/MM3 (ref 0.9–4.8)
LYMPHOCYTES NFR BLD AUTO: 10.3 % (ref 19.6–45.3)
MCH RBC QN AUTO: 29.8 PG (ref 26.9–32)
MCHC RBC AUTO-ENTMCNC: 32.6 G/DL (ref 32.4–36.3)
MCV RBC AUTO: 91.4 FL (ref 80.5–98.2)
MONOCYTES # BLD AUTO: 1.69 10*3/MM3 (ref 0.2–1.2)
MONOCYTES NFR BLD AUTO: 6.7 % (ref 5–12)
NEUTROPHILS # BLD AUTO: 20.7 10*3/MM3 (ref 1.9–8.1)
NEUTROPHILS NFR BLD AUTO: 82.7 % (ref 42.7–76)
PLATELET # BLD AUTO: 294 10*3/MM3 (ref 140–500)
PMV BLD AUTO: 11.5 FL (ref 6–12)
POTASSIUM BLD-SCNC: 4.6 MMOL/L (ref 3.5–5.2)
RBC # BLD AUTO: 4.53 10*6/MM3 (ref 3.9–5.2)
SODIUM BLD-SCNC: 137 MMOL/L (ref 136–145)
WBC NRBC COR # BLD: 25.07 10*3/MM3 (ref 4.5–10.7)

## 2018-06-15 PROCEDURE — 25810000003 POTASSIUM CHLORIDE PER 2 MEQ: Performed by: OBSTETRICS & GYNECOLOGY

## 2018-06-15 PROCEDURE — 85025 COMPLETE CBC W/AUTO DIFF WBC: CPT | Performed by: OBSTETRICS & GYNECOLOGY

## 2018-06-15 PROCEDURE — 80048 BASIC METABOLIC PNL TOTAL CA: CPT | Performed by: OBSTETRICS & GYNECOLOGY

## 2018-06-15 PROCEDURE — 99024 POSTOP FOLLOW-UP VISIT: CPT | Performed by: OBSTETRICS & GYNECOLOGY

## 2018-06-15 RX ORDER — OXYCODONE HYDROCHLORIDE AND ACETAMINOPHEN 5; 325 MG/1; MG/1
1-2 TABLET ORAL EVERY 4 HOURS PRN
Qty: 30 TABLET | Refills: 0 | Status: SHIPPED | OUTPATIENT
Start: 2018-06-15 | End: 2018-08-27

## 2018-06-15 RX ORDER — IBUPROFEN 800 MG/1
800 TABLET ORAL EVERY 8 HOURS PRN
Qty: 40 TABLET | Refills: 2 | Status: SHIPPED | OUTPATIENT
Start: 2018-06-15 | End: 2018-08-27

## 2018-06-15 RX ADMIN — OXYCODONE HYDROCHLORIDE AND ACETAMINOPHEN 1 TABLET: 5; 325 TABLET ORAL at 05:47

## 2018-06-15 RX ADMIN — OXYCODONE HYDROCHLORIDE AND ACETAMINOPHEN 1 TABLET: 5; 325 TABLET ORAL at 08:46

## 2018-06-15 RX ADMIN — POTASSIUM CHLORIDE AND SODIUM CHLORIDE 100 ML/HR: 450; 150 INJECTION, SOLUTION INTRAVENOUS at 02:09

## 2018-06-15 NOTE — PROGRESS NOTES
Case Management Discharge Note              Other:  (Private vehicle)    Final Discharge Disposition Code: 01 - home or self-care

## 2018-06-15 NOTE — PROGRESS NOTES
6/15/2018    Name:Kathleen Villanueva   MR#:[id number]      GYN Progress Note       HD:0    Subjective   42 y.o.yo  POD#1 s/p uncomplicated total laparoscopic hysterectomy.  The patient reports feeling well with appropriate discomfort.  She complains of an intermittent moderate cough with some associated pelvic pressures and infrequent vaginal spotting.    She reports being ready for discharge.     Patient Active Problem List   Diagnosis   • Well female exam with routine gynecological exam   • Smoker unmotivated to quit   • Abnormal uterine bleeding (AUB)       Objective     Vital Signs Range for the last 24 hours  Temp: Temp:  [97 °F (36.1 °C)-98.7 °F (37.1 °C)] 98.4 °F (36.9 °C) Temp src: Oral  BP: BP: (100-132)/(62-90) 100/62   BP Readings from Last 3 Encounters:   06/15/18 100/62   18 123/85   18 122/64     Pulse: Heart Rate:  [] 69   Pulse Readings from Last 3 Encounters:   06/15/18 69   18 89     Resp:  Resp:  [16-20] 16    Lab Results   Component Value Date    WBC 25.07 (H) 06/15/2018    HGB 13.5 06/15/2018    HCT 41.4 06/15/2018    MCV 91.4 06/15/2018     06/15/2018       No components found for: SODIUM,  POTASSIUM,  GLUCOSE,  CREATININE  Lab Results   Component Value Date    GLUCOSE 129 (H) 06/15/2018    BUN 13 06/15/2018    CREATININE 0.80 06/15/2018    CO2 24.3 06/15/2018    CALCIUM 9.4 06/15/2018   [facility]    I/O last 3 completed shifts:  In: 3595 [P.O.:240; I.V.:3355]  Out: 2925 [Urine:2800; Blood:125]  No intake/output data recorded.    Review of Systems   Constitutional: Negative for chills, diaphoresis and fever.   Respiratory: Positive for cough.    Cardiovascular: Negative for chest pain and palpitations.   Gastrointestinal: Positive for abdominal pain. Negative for nausea and vomiting.         Physical Exam:  Physical Exam   Constitutional: She is oriented to person, place, and time. She appears well-developed and well-nourished. No distress.    Cardiovascular: Normal rate and regular rhythm.    Pulmonary/Chest: Effort normal and breath sounds normal.   Abdominal: Soft. Bowel sounds are normal. She exhibits no distension and no mass. There is no tenderness.   Dried blood about the incisions   Genitourinary:   Genitourinary Comments: Very scant spotting on pad      Neurological: She is alert and oriented to person, place, and time.   Psychiatric: She has a normal mood and affect. Her behavior is normal. Judgment and thought content normal.   Nursing note and vitals reviewed.          Assessment/Plan   1. POD#1  [facility] - uncomplicated recovery  Plan discharge.         Henri Painter MD  6/15/2018 7:40 AM

## 2018-06-18 LAB
CYTO UR: NORMAL
LAB AP CASE REPORT: NORMAL
Lab: NORMAL
PATH REPORT.FINAL DX SPEC: NORMAL
PATH REPORT.GROSS SPEC: NORMAL

## 2018-06-20 ENCOUNTER — OFFICE VISIT (OUTPATIENT)
Dept: OBSTETRICS AND GYNECOLOGY | Age: 43
End: 2018-06-20

## 2018-06-20 VITALS
DIASTOLIC BLOOD PRESSURE: 60 MMHG | TEMPERATURE: 98.4 F | SYSTOLIC BLOOD PRESSURE: 122 MMHG | BODY MASS INDEX: 21.83 KG/M2 | HEIGHT: 65 IN | WEIGHT: 131 LBS

## 2018-06-20 DIAGNOSIS — R31.0 HEMATURIA, GROSS: ICD-10-CM

## 2018-06-20 DIAGNOSIS — F17.200 SMOKER UNMOTIVATED TO QUIT: ICD-10-CM

## 2018-06-20 DIAGNOSIS — R30.0 DYSURIA: Primary | ICD-10-CM

## 2018-06-20 LAB
BILIRUB BLD-MCNC: NORMAL MG/DL
CLARITY, POC: CLEAR
COLOR UR: YELLOW
GLUCOSE UR STRIP-MCNC: NEGATIVE MG/DL
KETONES UR QL: NEGATIVE
LEUKOCYTE EST, POC: NEGATIVE
NITRITE UR-MCNC: NEGATIVE MG/ML
PH UR: 5.5 [PH] (ref 5–8)
PROT UR STRIP-MCNC: NORMAL MG/DL
RBC # UR STRIP: NORMAL /UL
SP GR UR: 1.03 (ref 1–1.03)
UROBILINOGEN UR QL: NORMAL

## 2018-06-20 PROCEDURE — 81002 URINALYSIS NONAUTO W/O SCOPE: CPT | Performed by: OBSTETRICS & GYNECOLOGY

## 2018-06-20 PROCEDURE — 99213 OFFICE O/P EST LOW 20 MIN: CPT | Performed by: OBSTETRICS & GYNECOLOGY

## 2018-06-20 RX ORDER — NITROFURANTOIN 25; 75 MG/1; MG/1
100 CAPSULE ORAL 2 TIMES DAILY
Qty: 10 CAPSULE | Refills: 0 | Status: SHIPPED | OUTPATIENT
Start: 2018-06-20 | End: 2018-08-27

## 2018-06-20 NOTE — PROGRESS NOTES
2018      Patient:  Kathleen Villanueva   MR#:[id number]    Office note    Chief Complaint   Patient presents with   • Post-op Follow-up     TLH/bs 6 DAYS. Pain with urinating and lower back pain.       Subjective     History of Present Illness  42 y.o. female  persistent 6 days postop complaining of severe pain with urination and some low back pain.  UA is not particularly suspicious.  We discussed normal symptoms of discomfort after hysterectomy.  Urine will be sent for culture and empiric [place] miotic will be sent her pharmacy.  The patient reports abdominal pain is significantly improving      Patient Active Problem List   Diagnosis   • Well female exam with routine gynecological exam   • Smoker unmotivated to quit   • Abnormal uterine bleeding (AUB)       Past Medical History:   Diagnosis Date   • Abnormal Pap smear of cervix    • Asthma    • DDD (degenerative disc disease), thoracolumbar    • Degenerative disc disease, lumbar    • Depression    • Dysmenorrhea    • Elevated cholesterol    • Endomyometritis    • Genital warts    • Headache    • Hx of renal calculi    • Hyperthyroidism    • Irregular heart beat    • Menorrhagia    • Sleep apnea     NO CPAP USE       Past Surgical History:   Procedure Laterality Date   • BREAST BIOPSY      Left Breast Benign    • TOTAL LAPAROSCOPIC HYSTERECTOMY SALPINGO OOPHORECTOMY Bilateral 2018    Procedure: TOTAL LAPAROSCOPIC HYSTERECTOMY BILATERAL SALPINGECTOMY  CYSTOSCOPY;  Surgeon: Henri Painter MD;  Location: [facility];  Service: Obstetrics/Gynecology   • WISDOM TOOTH EXTRACTION         Obstetric History:  OB History      Para Term  AB Living    3 3 3     3    SAB TAB Ectopic Molar Multiple Live Births              3         Menstrual History:     Patient's last menstrual period was 2018 (lmp unknown).       #: 1, Date: 1999, Sex: Male, Weight: 3459 g (7 lb 10 oz), GA: 40w0d, Delivery: Vaginal,  Spontaneous Delivery, Apgar1: None, Apgar5: None, Living: Living, Birth Comments: None    #: 2, Date: 04/19/04, Sex: Male, Weight: 3033 g (6 lb 11 oz), GA: 38w0d, Delivery: Vaginal, Spontaneous Delivery, Apgar1: 9, Apgar5: 9, Living: Living, Birth Comments: None    #: 3, Date: 01/04/07, Sex: Female, Weight: 2778 g (6 lb 2 oz), GA: 39w0d, Delivery: Vaginal, Spontaneous Delivery, Apgar1: 8, Apgar5: 9, Living: Living, Birth Comments: None      Family History   Problem Relation Age of Onset   • Hypertension Mother    • Heart disease Mother    • Asthma Mother    • Diabetes Mother    • Colon cancer Mother 26   • Melanoma Mother         mole on ankle   • Brain cancer Mother         unsure of age   • Cervical cancer Mother         unsure of age   • Stroke Father    • Hypertension Father    • Breast cancer Neg Hx    • Ovarian cancer Neg Hx    • Uterine cancer Neg Hx    • Prostate cancer Neg Hx    • Malig Hyperthermia Neg Hx        Social History   Substance Use Topics   • Smoking status: Current Every Day Smoker     Packs/day: 1.00     Years: 30.00     Types: Cigarettes   • Smokeless tobacco: Never Used   • Alcohol use Yes      Comment: rare       Ultram [tramadol]      Current Outpatient Prescriptions:   •  fexofenadine-pseudoephedrine (ALLEGRA-D 24) 180-240 MG per 24 hr tablet, Take 1 tablet by mouth As Needed., Disp: , Rfl:   •  ibuprofen (ADVIL,MOTRIN) 800 MG tablet, Take 1 tablet by mouth Every 8 (Eight) Hours As Needed for Mild Pain  or Moderate Pain  for up to 40 doses., Disp: 40 tablet, Rfl: 2  •  Multiple Vitamins-Minerals (HAIR SKIN AND NAILS FORMULA PO), Take  by mouth Daily., Disp: , Rfl:   •  oxyCODONE-acetaminophen (PERCOCET) 5-325 MG per tablet, Take 1-2 tablets by mouth Every 4 (Four) Hours As Needed (Pain)., Disp: 30 tablet, Rfl: 0  •  VENTOLIN  (90 Base) MCG/ACT inhaler, Inhale 2 puffs Every 4 (Four) Hours As Needed., Disp: , Rfl: 4    The following portions of the patient's history were reviewed and  "updated as appropriate: allergies, current medications, past family history, past medical history, past social history, past surgical history and problem list.    Review of Systems   Constitutional: Negative.    Respiratory: Positive for cough. Negative for apnea, chest tightness and shortness of breath.    Cardiovascular: Negative.  Negative for chest pain.   Gastrointestinal: Positive for abdominal distention and abdominal pain. Negative for diarrhea, nausea and vomiting.   Genitourinary: Positive for dysuria. Negative for urgency and vaginal bleeding.   Neurological: Negative.    Psychiatric/Behavioral: Negative.        BP Readings from Last 3 Encounters:   06/20/18 122/60   06/15/18 110/66   06/12/18 123/85      Wt Readings from Last 3 Encounters:   06/20/18 59.4 kg (131 lb)   06/14/18 58.8 kg (129 lb 10.1 oz)   06/12/18 58.9 kg (129 lb 14.4 oz)      BMI: Estimated body mass index is 21.8 kg/m² as calculated from the following:    Height as of this encounter: 165.1 cm (65\").    Weight as of this encounter: 59.4 kg (131 lb).  BSA: Estimated body surface area is 1.65 meters squared as calculated from the following:    Height as of this encounter: 165.1 cm (65\").    Weight as of this encounter: 59.4 kg (131 lb).    Objective   Physical Exam   Constitutional: She is oriented to person, place, and time. She appears well-developed and well-nourished.   HENT:   Head: Normocephalic and atraumatic.   Cardiovascular: Normal rate.    Pulmonary/Chest: Effort normal.   Abdominal: Soft. Bowel sounds are normal. She exhibits distension. She exhibits no mass. There is tenderness. There is no rebound and no guarding. No hernia.   Mild bloating and distention.  No acute findings on abdominal exam    Incisions healing well   Genitourinary: Vagina normal.   Genitourinary Comments: No vaginal bleeding.  Normal appropriate discharge   Neurological: She is alert and oriented to person, place, and time.   Skin: Skin is warm and dry. "   Psychiatric: She has a normal mood and affect. Her behavior is normal. Judgment and thought content normal.   Nursing note and vitals reviewed.        Assessment/Plan     Kathleen was seen today for post-op follow-up.    Diagnoses and all orders for this visit:    Dysuria    Hematuria, gross  -     POC Urinalysis Dipstick    Smoker unmotivated to quit          Return in about 5 weeks (around 7/25/2018), or postop Cuff check .        Henri Painter MD   6/20/2018 12:25 PM

## 2018-06-22 LAB
BACTERIA UR CULT: NORMAL
BACTERIA UR CULT: NORMAL

## 2018-06-25 RX ORDER — METRONIDAZOLE 500 MG/1
500 TABLET ORAL 2 TIMES DAILY
Qty: 14 TABLET | Refills: 0 | Status: SHIPPED | OUTPATIENT
Start: 2018-06-25 | End: 2018-07-02

## 2018-07-24 ENCOUNTER — OFFICE VISIT (OUTPATIENT)
Dept: OBSTETRICS AND GYNECOLOGY | Age: 43
End: 2018-07-24

## 2018-07-24 VITALS
SYSTOLIC BLOOD PRESSURE: 112 MMHG | WEIGHT: 130 LBS | BODY MASS INDEX: 21.66 KG/M2 | HEIGHT: 65 IN | DIASTOLIC BLOOD PRESSURE: 78 MMHG

## 2018-07-24 DIAGNOSIS — Z13.89 SCREENING FOR BLOOD OR PROTEIN IN URINE: ICD-10-CM

## 2018-07-24 DIAGNOSIS — Z09 POSTOP CHECK: Primary | ICD-10-CM

## 2018-07-24 LAB
BILIRUB BLD-MCNC: NEGATIVE MG/DL
CLARITY, POC: CLEAR
COLOR UR: YELLOW
GLUCOSE UR STRIP-MCNC: NEGATIVE MG/DL
KETONES UR QL: NEGATIVE
LEUKOCYTE EST, POC: NEGATIVE
NITRITE UR-MCNC: NEGATIVE MG/ML
PH UR: 5.5 [PH] (ref 5–8)
PROT UR STRIP-MCNC: NEGATIVE MG/DL
RBC # UR STRIP: ABNORMAL /UL
SP GR UR: 1.01 (ref 1–1.03)
UROBILINOGEN UR QL: NORMAL

## 2018-07-24 PROCEDURE — 81002 URINALYSIS NONAUTO W/O SCOPE: CPT | Performed by: OBSTETRICS & GYNECOLOGY

## 2018-07-24 PROCEDURE — 99024 POSTOP FOLLOW-UP VISIT: CPT | Performed by: OBSTETRICS & GYNECOLOGY

## 2018-07-24 NOTE — PROGRESS NOTES
Postop Visit    2018    Patient: Kathleen Villanueva          MR#:[id number]    History of Present Illness    42 y.o. female  status post total laparoscopic hysterectomy with bilateral salpingectomy returns for postop check stating has 2 residual sutures but is feeling great from the standpoint of recovery.  Essentially no abdominal pain and significant improvement over previous symptoms that merited intervention.     ________________________________________  Patient Active Problem List   Diagnosis   • Well female exam with routine gynecological exam   • Smoker unmotivated to quit   • Abnormal uterine bleeding (AUB)       Past Medical History:   Diagnosis Date   • Abnormal Pap smear of cervix    • Asthma    • DDD (degenerative disc disease), thoracolumbar    • Degenerative disc disease, lumbar    • Depression    • Dysmenorrhea    • Elevated cholesterol    • Endomyometritis    • Genital warts    • Headache    • Hx of renal calculi    • Hyperthyroidism    • Irregular heart beat    • Menorrhagia    • Sleep apnea     NO CPAP USE       Past Surgical History:   Procedure Laterality Date   • BREAST BIOPSY      Left Breast Benign    • TOTAL LAPAROSCOPIC HYSTERECTOMY SALPINGO OOPHORECTOMY Bilateral 2018    Procedure: TOTAL LAPAROSCOPIC HYSTERECTOMY BILATERAL SALPINGECTOMY  CYSTOSCOPY;  Surgeon: Henri Painter MD;  Location: [facility];  Service: Obstetrics/Gynecology   • WISDOM TOOTH EXTRACTION         History   Smoking Status   • Current Every Day Smoker   • Packs/day: 1.00   • Years: 30.00   • Types: Cigarettes   Smokeless Tobacco   • Never Used       has a current medication list which includes the following prescription(s): fexofenadine-pseudoephedrine, multiple vitamins-minerals, ventolin hfa, ibuprofen, nitrofurantoin (macrocrystal-monohydrate), and oxycodone-acetaminophen.  ________________________________________  Review of Systems   Constitutional: Negative.   "  Respiratory: Negative.    Cardiovascular: Negative.    Gastrointestinal: Negative.    Genitourinary: Negative.    Psychiatric/Behavioral: Negative.             Objective       /78   Ht 165.1 cm (65\")   Wt 59 kg (130 lb)   LMP 06/12/2018 (LMP Unknown)   Breastfeeding? No   BMI 21.63 kg/m²    BP Readings from Last 3 Encounters:   07/24/18 112/78   06/20/18 122/60   06/15/18 110/66      Wt Readings from Last 3 Encounters:   07/24/18 59 kg (130 lb)   06/20/18 59.4 kg (131 lb)   06/14/18 58.8 kg (129 lb 10.1 oz)      BMI: Estimated body mass index is 21.63 kg/m² as calculated from the following:    Height as of this encounter: 165.1 cm (65\").    Weight as of this encounter: 59 kg (130 lb).    EXAM     General:     Patient appears well in NAD  Abdomen: Soft, NT, +BS, no acute findings  Pelvic:  Residual suture removed with well-healed cuff  Incision: Dry, clean, intact healing without signs of infection  Ext:  No cyanosis, edema 1-2    Assessment:    1. Normal postop check      Plan:  Annual exam     Henri Painter MD  7/24/2018 9:35 AM  "

## 2018-08-27 ENCOUNTER — OFFICE VISIT (OUTPATIENT)
Dept: OBSTETRICS AND GYNECOLOGY | Age: 43
End: 2018-08-27

## 2018-08-27 VITALS
HEIGHT: 65 IN | SYSTOLIC BLOOD PRESSURE: 110 MMHG | BODY MASS INDEX: 21.16 KG/M2 | DIASTOLIC BLOOD PRESSURE: 70 MMHG | WEIGHT: 127 LBS

## 2018-08-27 DIAGNOSIS — N89.8 VAGINAL DISCHARGE: Primary | ICD-10-CM

## 2018-08-27 PROCEDURE — 99213 OFFICE O/P EST LOW 20 MIN: CPT | Performed by: OBSTETRICS & GYNECOLOGY

## 2018-08-27 RX ORDER — ATORVASTATIN CALCIUM 20 MG/1
20 TABLET, FILM COATED ORAL DAILY
COMMUNITY
End: 2020-10-26 | Stop reason: SDUPTHER

## 2018-08-27 NOTE — PROGRESS NOTES
"    2018      Patient:  Kathleen Villanueva   MR#:[id number]    Office note    Chief Complaint   Patient presents with   • Follow-up     Yellow discharge.  [facility] 18       Subjective     History of Present Illness  42 y.o. female  returns with complaint of intermittent fairly copious vaginal discharge for the past month and it's mostly yellow in color with no particular odor.  The episodes seem much more pronounced while working.  The patient had intercourse a week ago and had some spotting with intercourse and is worried that she \"tore something.\"      Patient Active Problem List   Diagnosis   • Well female exam with routine gynecological exam   • Smoker unmotivated to quit   • Abnormal uterine bleeding (AUB)       Past Medical History:   Diagnosis Date   • Abnormal Pap smear of cervix    • Asthma    • DDD (degenerative disc disease), thoracolumbar    • Degenerative disc disease, lumbar    • Depression    • Dysmenorrhea    • Elevated cholesterol    • Endomyometritis    • Genital warts    • Headache    • Hx of renal calculi    • Hyperthyroidism    • Irregular heart beat    • Menorrhagia    • Sleep apnea     NO CPAP USE       Past Surgical History:   Procedure Laterality Date   • BREAST BIOPSY      Left Breast Benign    • TOTAL LAPAROSCOPIC HYSTERECTOMY SALPINGO OOPHORECTOMY Bilateral 2018    Procedure: TOTAL LAPAROSCOPIC HYSTERECTOMY BILATERAL SALPINGECTOMY  CYSTOSCOPY;  Surgeon: Henri Painter MD;  Location: [facility];  Service: Obstetrics/Gynecology   • WISDOM TOOTH EXTRACTION         Obstetric History:  OB History      Para Term  AB Living    3 3 3     3    SAB TAB Ectopic Molar Multiple Live Births              3         Menstrual History:     Patient's last menstrual period was 2018 (lmp unknown).       #: 1, Date: 1999, Sex: Male, Weight: 3459 g (7 lb 10 oz), GA: 40w0d, Delivery: Vaginal, Spontaneous Delivery, Apgar1: None, Apgar5: " None, Living: Living, Birth Comments: None    #: 2, Date: 04/19/04, Sex: Male, Weight: 3033 g (6 lb 11 oz), GA: 38w0d, Delivery: Vaginal, Spontaneous Delivery, Apgar1: 9, Apgar5: 9, Living: Living, Birth Comments: None    #: 3, Date: 01/04/07, Sex: Female, Weight: 2778 g (6 lb 2 oz), GA: 39w0d, Delivery: Vaginal, Spontaneous Delivery, Apgar1: 8, Apgar5: 9, Living: Living, Birth Comments: None      Family History   Problem Relation Age of Onset   • Hypertension Mother    • Heart disease Mother    • Asthma Mother    • Diabetes Mother    • Colon cancer Mother 26   • Melanoma Mother         mole on ankle   • Brain cancer Mother         unsure of age   • Cervical cancer Mother         unsure of age   • Stroke Father    • Hypertension Father    • Breast cancer Neg Hx    • Ovarian cancer Neg Hx    • Uterine cancer Neg Hx    • Prostate cancer Neg Hx    • Malig Hyperthermia Neg Hx        Social History   Substance Use Topics   • Smoking status: Current Every Day Smoker     Packs/day: 1.00     Years: 30.00     Types: Cigarettes   • Smokeless tobacco: Never Used   • Alcohol use Yes      Comment: rare       Ultram [tramadol]      Current Outpatient Prescriptions:   •  atorvastatin (LIPITOR) 20 MG tablet, Take 20 mg by mouth Daily., Disp: , Rfl:   •  fexofenadine-pseudoephedrine (ALLEGRA-D 24) 180-240 MG per 24 hr tablet, Take 1 tablet by mouth As Needed., Disp: , Rfl:   •  Metoprolol Succinate 25 MG capsule extended-release 24 hour sprinkle, Take  by mouth., Disp: , Rfl:   •  Multiple Vitamins-Minerals (HAIR SKIN AND NAILS FORMULA PO), Take  by mouth Daily., Disp: , Rfl:   •  VENTOLIN  (90 Base) MCG/ACT inhaler, Inhale 2 puffs Every 4 (Four) Hours As Needed., Disp: , Rfl: 4    The following portions of the patient's history were reviewed and updated as appropriate: allergies, current medications, past family history, past medical history, past social history, past surgical history and problem list.    Review of Systems  "  Constitutional: Negative.    Respiratory: Negative.    Cardiovascular: Negative.    Gastrointestinal: Negative.    Genitourinary: Positive for vaginal discharge.   Psychiatric/Behavioral: Negative.        BP Readings from Last 3 Encounters:   08/27/18 110/70   07/24/18 112/78   06/20/18 122/60      Wt Readings from Last 3 Encounters:   08/27/18 57.6 kg (127 lb)   07/24/18 59 kg (130 lb)   06/20/18 59.4 kg (131 lb)      BMI: Estimated body mass index is 21.13 kg/m² as calculated from the following:    Height as of this encounter: 165.1 cm (65\").    Weight as of this encounter: 57.6 kg (127 lb).  BSA: Estimated body surface area is 1.63 meters squared as calculated from the following:    Height as of this encounter: 165.1 cm (65\").    Weight as of this encounter: 57.6 kg (127 lb).    Objective   Physical Exam   Constitutional: She is oriented to person, place, and time. She appears well-developed and well-nourished.   Pulmonary/Chest: Effort normal.   Abdominal: Soft. Bowel sounds are normal. She exhibits no distension and no mass. There is no tenderness.   Genitourinary: Vagina normal and uterus normal.   Genitourinary Comments: Moderate amount of yellow discharge and no odor noted.  New swab obtained.    Area cleaned well and the apical vagina appears very well-healed with no disruptions in the cuff are visible suture   Neurological: She is alert and oriented to person, place, and time.   Psychiatric: She has a normal mood and affect. Her behavior is normal. Judgment and thought content normal.   Nursing note and vitals reviewed.        Assessment/Plan     Kathleen was seen today for follow-up.    Diagnoses and all orders for this visit:    Vaginal discharge  -     NuSwab VG+ - Swab, Vagina          No Follow-up on file.        Henri Painter MD   8/27/2018 12:45 PM  "

## 2018-09-01 LAB
A VAGINAE DNA VAG QL NAA+PROBE: NORMAL SCORE
BVAB2 DNA VAG QL NAA+PROBE: NORMAL SCORE
C ALBICANS DNA VAG QL NAA+PROBE: NEGATIVE
C GLABRATA DNA VAG QL NAA+PROBE: NEGATIVE
C TRACH RRNA SPEC QL NAA+PROBE: NORMAL
MEGA1 DNA VAG QL NAA+PROBE: NORMAL SCORE
N GONORRHOEA RRNA SPEC QL NAA+PROBE: NORMAL
T VAGINALIS RRNA SPEC QL NAA+PROBE: NEGATIVE

## 2018-09-04 ENCOUNTER — TELEPHONE (OUTPATIENT)
Dept: OBSTETRICS AND GYNECOLOGY | Age: 43
End: 2018-09-04

## 2018-09-04 NOTE — TELEPHONE ENCOUNTER
----- Message from Henri Painter MD sent at 9/1/2018  5:18 PM EDT -----  Call pt:  Comprehensive vaginosis panel is negative.

## 2018-09-04 NOTE — TELEPHONE ENCOUNTER
"The panel wasn't complete.  The gonorrhea and chlamydia was cancelled due to \"interfering substance\".   Yo called [facility] and was told possibly too much [place] was on swab.  Didn't know why else it didn't read.  Do you want patient to have another swab done or were you concerned mainly with b.v. Or yeast?  "

## 2018-09-05 NOTE — TELEPHONE ENCOUNTER
Advise the patient suspicious is not high.  The screening was for BV and yeast.    If she wanted to return for STDs .. Happy to collect.

## 2019-01-09 ENCOUNTER — TRANSCRIBE ORDERS (OUTPATIENT)
Dept: ADMINISTRATIVE | Facility: HOSPITAL | Age: 44
End: 2019-01-09

## 2019-01-09 DIAGNOSIS — N64.4 BREAST TENDERNESS IN FEMALE: Primary | ICD-10-CM

## 2019-01-09 DIAGNOSIS — N63.20 LEFT BREAST MASS: ICD-10-CM

## 2019-01-17 ENCOUNTER — HOSPITAL ENCOUNTER (OUTPATIENT)
Dept: MAMMOGRAPHY | Facility: HOSPITAL | Age: 44
Discharge: HOME | End: 2019-01-17
Admitting: NURSE PRACTITIONER

## 2019-01-17 ENCOUNTER — HOSPITAL ENCOUNTER (OUTPATIENT)
Dept: ULTRASOUND IMAGING | Facility: HOSPITAL | Age: 44
Discharge: HOME | End: 2019-01-17

## 2019-01-17 DIAGNOSIS — N63.20 LEFT BREAST MASS: ICD-10-CM

## 2019-01-17 DIAGNOSIS — N64.4 BREAST TENDERNESS IN FEMALE: ICD-10-CM

## 2019-01-17 PROCEDURE — 77066 DX MAMMO INCL CAD BI: CPT

## 2019-01-17 PROCEDURE — 76642 ULTRASOUND BREAST LIMITED: CPT

## 2019-08-12 ENCOUNTER — OFFICE VISIT (OUTPATIENT)
Dept: OBSTETRICS AND GYNECOLOGY | Age: 44
End: 2019-08-12

## 2019-08-12 VITALS
DIASTOLIC BLOOD PRESSURE: 76 MMHG | BODY MASS INDEX: 19.29 KG/M2 | HEIGHT: 64 IN | SYSTOLIC BLOOD PRESSURE: 102 MMHG | WEIGHT: 113 LBS

## 2019-08-12 DIAGNOSIS — Z13.89 SCREENING FOR BLOOD OR PROTEIN IN URINE: ICD-10-CM

## 2019-08-12 DIAGNOSIS — Z01.419 WELL FEMALE EXAM WITH ROUTINE GYNECOLOGICAL EXAM: Primary | ICD-10-CM

## 2019-08-12 DIAGNOSIS — F17.200 SMOKER UNMOTIVATED TO QUIT: ICD-10-CM

## 2019-08-12 DIAGNOSIS — Z12.72 VAGINAL PAP SMEAR: ICD-10-CM

## 2019-08-12 PROBLEM — E78.5 DYSLIPIDEMIA: Status: ACTIVE | Noted: 2019-08-12

## 2019-08-12 LAB
BILIRUB BLD-MCNC: NEGATIVE MG/DL
CLARITY, POC: CLEAR
COLOR UR: YELLOW
GLUCOSE UR STRIP-MCNC: NEGATIVE MG/DL
KETONES UR QL: NEGATIVE
LEUKOCYTE EST, POC: NEGATIVE
NITRITE UR-MCNC: NEGATIVE MG/ML
PH UR: 5.5 [PH] (ref 5–8)
PROT UR STRIP-MCNC: NEGATIVE MG/DL
RBC # UR STRIP: ABNORMAL /UL
SP GR UR: 1 (ref 1–1.03)
UROBILINOGEN UR QL: NORMAL

## 2019-08-12 PROCEDURE — 81002 URINALYSIS NONAUTO W/O SCOPE: CPT | Performed by: OBSTETRICS & GYNECOLOGY

## 2019-08-12 PROCEDURE — 99396 PREV VISIT EST AGE 40-64: CPT | Performed by: OBSTETRICS & GYNECOLOGY

## 2019-08-12 RX ORDER — ESCITALOPRAM OXALATE 10 MG/1
10 TABLET ORAL DAILY
Refills: 3 | COMMUNITY
Start: 2019-06-07 | End: 2020-08-26 | Stop reason: HOSPADM

## 2019-08-12 RX ORDER — HYDROCHLOROTHIAZIDE 12.5 MG/1
12.5 CAPSULE, GELATIN COATED ORAL DAILY PRN
Refills: 3 | COMMUNITY
Start: 2019-05-08 | End: 2020-08-26 | Stop reason: ALTCHOICE

## 2019-08-12 RX ORDER — MONTELUKAST SODIUM 10 MG/1
10 TABLET ORAL
Refills: 3 | COMMUNITY
Start: 2019-06-07 | End: 2020-08-26 | Stop reason: HOSPADM

## 2019-08-12 NOTE — PROGRESS NOTES
Routine Annual Visit    2019    Patient: Kathleen Villanueva          MR#:[id number]      History of Present Illness    Chief Complaint   Patient presents with   • Gynecologic Exam     Annual.  Last pap 17, negative. Last mammo 19, benign.  (hx of [facility] 2018)       43 y.o. female  who presents for annual exam.    The patient presents for routine annual exam feeling well.  She reports no updates.  She is on low-dose hydrochlorothiazide and low-dose metoprolol for history of palpitations and mild pressure elevations.        Patient's last menstrual period was 2018 (lmp unknown).  Obstetric History:  OB History      Para Term  AB Living    3 3 3     3    SAB TAB Ectopic Molar Multiple Live Births              3         Menstrual History:     Patient's last menstrual period was 2018 (lmp unknown).       Sexual History:       ________________________________________  Patient Active Problem List   Diagnosis   • Well female exam with routine gynecological exam   • Smoker unmotivated to quit   • Abnormal uterine bleeding (AUB)   • Dyslipidemia       Past Medical History:   Diagnosis Date   • Abnormal Pap smear of cervix    • Asthma    • DDD (degenerative disc disease), thoracolumbar    • Degenerative disc disease, lumbar    • Depression    • Dysmenorrhea    • Elevated cholesterol    • Endomyometritis    • Genital warts    • Hx of renal calculi    • Irregular heart beat    • Menorrhagia    • Sleep apnea     NO CPAP USE       Past Surgical History:   Procedure Laterality Date   • BREAST BIOPSY      Left Breast Benign    • TOTAL LAPAROSCOPIC HYSTERECTOMY SALPINGO OOPHORECTOMY Bilateral 2018    Procedure: TOTAL LAPAROSCOPIC HYSTERECTOMY BILATERAL SALPINGECTOMY  CYSTOSCOPY;  Surgeon: Henri Painter MD;  Location: [facility];  Service: Obstetrics/Gynecology   • WISDOM TOOTH EXTRACTION         Social History     Tobacco Use   Smoking Status  Current Every Day Smoker   • Packs/day: 1.00   • Years: 30.00   • Pack years: 30.00   • Types: Cigarettes   Smokeless Tobacco Never Used       Family History   Problem Relation Age of Onset   • Hypertension Mother    • Heart disease Mother    • Asthma Mother    • Diabetes Mother    • Colon cancer Mother 26   • Melanoma Mother         mole on ankle   • Brain cancer Mother         unsure of age   • Cervical cancer Mother         unsure of age   • Stroke Father    • Hypertension Father    • Breast cancer Neg Hx    • Ovarian cancer Neg Hx    • Uterine cancer Neg Hx    • Prostate cancer Neg Hx    • Malig Hyperthermia Neg Hx        Prior to Admission medications    Medication Sig Start Date End Date Taking? Authorizing Provider   atorvastatin (LIPITOR) 20 MG tablet Take 20 mg by mouth Daily.   Yes Antoinette Ross MD   escitalopram (LEXAPRO) 10 MG tablet Take 10 mg by mouth Daily. 6/7/19  Yes Antoinette Ross MD   fexofenadine-pseudoephedrine (ALLEGRA-D 24) 180-240 MG per 24 hr tablet Take 1 tablet by mouth As Needed.   Yes Antoinette Ross MD   hydrochlorothiazide (MICROZIDE) 12.5 MG capsule Take 12.5 mg by mouth Daily. 5/8/19  Yes Antoinette Ross MD   Metoprolol Succinate 25 MG capsule extended-release 24 hour sprinkle Take  by mouth.   Yes Antoinette Ross MD   montelukast (SINGULAIR) 10 MG tablet Take 10 mg by mouth every night at bedtime. 6/7/19  Yes Antoinette Ross MD   VENTOLIN  (90 Base) MCG/ACT inhaler Inhale 2 puffs Every 4 (Four) Hours As Needed. 6/20/17  Yes Antoinette Ross MD   Multiple Vitamins-Minerals (HAIR SKIN AND NAILS FORMULA PO) Take  by mouth Daily.  8/12/19  Antoinette Ross MD     ________________________________________    Current contraception: status post hysterectomy  History of abnormal Pap smear: no  Family history of uterine or ovarian cancer: no  Family History of colon cancer/colon polyps: no  History of abnormal mammogram: no  History of  "abnormal lipids: yes - treated.    The following portions of the patient's history were reviewed and updated as appropriate: allergies, current medications, past family history, past medical history, past social history, past surgical history and problem list.    Review of Systems    Pertinent items are noted in HPI.     Objective   Physical Exam    /76   Ht 162.6 cm (64\")   Wt 51.3 kg (113 lb)   LMP 06/12/2018 (LMP Unknown)   Breastfeeding? No   BMI 19.40 kg/m²    BP Readings from Last 3 Encounters:   08/12/19 102/76   08/27/18 110/70   07/24/18 112/78      Wt Readings from Last 3 Encounters:   08/12/19 51.3 kg (113 lb)   08/27/18 57.6 kg (127 lb)   07/24/18 59 kg (130 lb)        BMI: Estimated body mass index is 19.4 kg/m² as calculated from the following:    Height as of this encounter: 162.6 cm (64\").    Weight as of this encounter: 51.3 kg (113 lb).       General: alert, appears stated age and cooperative   Heart: regular rate and rhythm, S1, S2 normal, no murmur, click, rub or gallop   Lungs: clear to auscultation bilaterally   Abdomen: soft, non-tender, without masses, no organomegaly   Breast: inspection negative, no nipple discharge or bleeding, no masses or nodularity palpable   Vulva: normal and bartholin's, Urethra, [place]'s normal   Vagina: normal mucosa, normal discharge   Cervix: absent   Uterus: absent    Adnexa: normal adnexa     As part of wellness and prevention, the following topics were discussed with the patient:     []  Nutrition  []  Physical activity/regular exercise   []  Healthy weight  []  Injury prevention  [x]  Smoking cessation  []  Substance misuse/abuse  []  Sexual behavior  []  STD prevention  []  Contaception  []  Dental health  []  Mental health  []  Immunization  [x]  Encouraged SBE        Assessment:    Kathleen was seen today for gynecologic exam.    Diagnoses and all orders for this visit:    Well female exam with routine gynecological exam  -     IgP, Aptima " HPV    Screening for blood or protein in urine  -     POC Urinalysis Dipstick    Vaginal Pap smear  -     IgP, Aptima HPV    Smoker unmotivated to quit    Ready to quit: No  Counseling given: Yes          Plan:  Return in about 1 year (around 8/12/2020) for Annual GYN exam.      Henri Painter MD  8/12/2019 10:27 AM

## 2019-08-14 ENCOUNTER — TELEPHONE (OUTPATIENT)
Dept: OBSTETRICS AND GYNECOLOGY | Age: 44
End: 2019-08-14

## 2019-08-14 LAB
CYTOLOGIST CVX/VAG CYTO: NORMAL
CYTOLOGY CVX/VAG DOC CYTO: NORMAL
CYTOLOGY CVX/VAG DOC THIN PREP: NORMAL
DX ICD CODE: NORMAL
HIV 1 & 2 AB SER-IMP: NORMAL
HPV I/H RISK 4 DNA CVX QL PROBE+SIG AMP: NEGATIVE
OTHER STN SPEC: NORMAL
STAT OF ADQ CVX/VAG CYTO-IMP: NORMAL

## 2019-08-14 NOTE — TELEPHONE ENCOUNTER
----- Message from Henri Painter MD sent at 8/14/2019  2:36 PM EDT -----  Call pt:  PAP is negative.

## 2019-09-04 ENCOUNTER — TELEPHONE (OUTPATIENT)
Dept: OBSTETRICS AND GYNECOLOGY | Age: 44
End: 2019-09-04

## 2019-10-30 ENCOUNTER — OFFICE VISIT (OUTPATIENT)
Dept: OBSTETRICS AND GYNECOLOGY | Age: 44
End: 2019-10-30

## 2019-10-30 VITALS
DIASTOLIC BLOOD PRESSURE: 76 MMHG | WEIGHT: 117 LBS | BODY MASS INDEX: 19.49 KG/M2 | SYSTOLIC BLOOD PRESSURE: 100 MMHG | HEIGHT: 65 IN

## 2019-10-30 DIAGNOSIS — R68.82 LOW LIBIDO: Primary | ICD-10-CM

## 2019-10-30 PROCEDURE — 99213 OFFICE O/P EST LOW 20 MIN: CPT | Performed by: OBSTETRICS & GYNECOLOGY

## 2019-10-30 NOTE — PROGRESS NOTES
10/30/2019      Patient:  Kathleen Villanueva   MR#:[id number]    Office note    Chief Complaint   Patient presents with   • Follow-up     c/o low libido.        Subjective     History of Present Illness  43 y.o. female  with complaint of intermittent moderate decreased libido that is long-standing as far back as she can remember.  The patient recently saw her primary care doctor who suggested that there may be additional treatments that are available.  Topical testosterone is the forefront and treatment of low libido was discussed at length.  The patient wished to try the medication.      Patient Active Problem List   Diagnosis   • Well female exam with routine gynecological exam   • Smoker unmotivated to quit   • Abnormal uterine bleeding (AUB)   • Dyslipidemia       Past Medical History:   Diagnosis Date   • Abnormal Pap smear of cervix    • Asthma    • DDD (degenerative disc disease), thoracolumbar    • Degenerative disc disease, lumbar    • Depression    • Dysmenorrhea    • Elevated cholesterol    • Endomyometritis    • Genital warts    • Hx of renal calculi    • Irregular heart beat    • Menorrhagia    • Sleep apnea     NO CPAP USE       Past Surgical History:   Procedure Laterality Date   • BREAST BIOPSY      Left Breast Benign    • TOTAL LAPAROSCOPIC HYSTERECTOMY SALPINGO OOPHORECTOMY Bilateral 2018    Procedure: TOTAL LAPAROSCOPIC HYSTERECTOMY BILATERAL SALPINGECTOMY  CYSTOSCOPY;  Surgeon: Henri Painter MD;  Location: [facility];  Service: Obstetrics/Gynecology   • WISDOM TOOTH EXTRACTION         Obstetric History:  OB History      Para Term  AB Living    3 3 3     3    SAB TAB Ectopic Molar Multiple Live Births              3         Menstrual History:     Patient's last menstrual period was 2018 (lmp unknown).       #: 1, Date: 1999, Sex: Male, Weight: 3459 g (7 lb 10 oz), GA: 40w0d, Delivery: Vaginal, Spontaneous, Apgar1: None,  Apgar5: None, Living: Living, Birth Comments: None    #: 2, Date: 04/19/04, Sex: Male, Weight: 3033 g (6 lb 11 oz), GA: 38w0d, Delivery: Vaginal, Spontaneous, Apgar1: 9, Apgar5: 9, Living: Living, Birth Comments: None    #: 3, Date: 01/04/07, Sex: Female, Weight: 2778 g (6 lb 2 oz), GA: 39w0d, Delivery: Vaginal, Spontaneous, Apgar1: 8, Apgar5: 9, Living: Living, Birth Comments: None      Family History   Problem Relation Age of Onset   • Hypertension Mother    • Heart disease Mother    • Asthma Mother    • Diabetes Mother    • Colon cancer Mother 26   • Melanoma Mother         mole on ankle   • Brain cancer Mother         unsure of age   • Cervical cancer Mother         unsure of age   • Stroke Father    • Hypertension Father    • Breast cancer Neg Hx    • Ovarian cancer Neg Hx    • Uterine cancer Neg Hx    • Prostate cancer Neg Hx    • Malig Hyperthermia Neg Hx        Social History     Tobacco Use   • Smoking status: Current Every Day Smoker     Packs/day: 1.00     Years: 30.00     Pack years: 30.00     Types: Cigarettes   • Smokeless tobacco: Never Used   Substance Use Topics   • Alcohol use: Yes     Comment: rare   • Drug use: No       Ultram [tramadol]      Current Outpatient Medications:   •  atorvastatin (LIPITOR) 20 MG tablet, Take 20 mg by mouth Daily., Disp: , Rfl:   •  escitalopram (LEXAPRO) 10 MG tablet, Take 10 mg by mouth Daily., Disp: , Rfl: 3  •  fexofenadine-pseudoephedrine (ALLEGRA-D 24) 180-240 MG per 24 hr tablet, Take 1 tablet by mouth As Needed., Disp: , Rfl:   •  hydrochlorothiazide (MICROZIDE) 12.5 MG capsule, Take 12.5 mg by mouth Daily As Needed., Disp: , Rfl: 3  •  Metoprolol Succinate 25 MG capsule extended-release 24 hour sprinkle, Take  by mouth Daily As Needed., Disp: , Rfl:   •  montelukast (SINGULAIR) 10 MG tablet, Take 10 mg by mouth every night at bedtime., Disp: , Rfl: 3  •  VENTOLIN  (90 Base) MCG/ACT inhaler, Inhale 2 puffs Every 4 (Four) Hours As Needed., Disp: , Rfl:  "4    The following portions of the patient's history were reviewed and updated as appropriate: allergies, current medications, past family history, past medical history, past social history, past surgical history and problem list.    Review of Systems   Constitutional: Negative.    Respiratory: Negative.    Cardiovascular: Negative.    Gastrointestinal: Negative.    Genitourinary: Negative.         Poor libido     Psychiatric/Behavioral: Negative.        BP Readings from Last 3 Encounters:   10/30/19 100/76   08/12/19 102/76   08/27/18 110/70      Wt Readings from Last 3 Encounters:   10/30/19 53.1 kg (117 lb)   08/12/19 51.3 kg (113 lb)   08/27/18 57.6 kg (127 lb)      BMI: Estimated body mass index is 19.47 kg/m² as calculated from the following:    Height as of this encounter: 165.1 cm (65\").    Weight as of this encounter: 53.1 kg (117 lb).  BSA: Estimated body surface area is 1.58 meters squared as calculated from the following:    Height as of this encounter: 165.1 cm (65\").    Weight as of this encounter: 53.1 kg (117 lb).    Objective   Physical Exam   Constitutional: She is oriented to person, place, and time. She appears well-developed and well-nourished.   HENT:   Head: Normocephalic and atraumatic.   Abdominal: Soft. Bowel sounds are normal. She exhibits no distension. There is no tenderness.   Genitourinary: Vagina normal.   Neurological: She is alert and oriented to person, place, and time.   Skin: Skin is warm and dry.   Psychiatric: She has a normal mood and affect. Her behavior is normal. Judgment and thought content normal.   Nursing note and vitals reviewed.        Assessment/Plan     Kathleen was seen today for follow-up.    Diagnoses and all orders for this visit:    Low libido      Orders for topical testosterone sent to [facility].  See detailed order in the media tab.  Detailed instructions provided to the patient      Return if symptoms worsen or fail to improve.        Henri Painter, " MD   10/30/2019 11:56 AM

## 2019-11-07 ENCOUNTER — TELEPHONE (OUTPATIENT)
Dept: OBSTETRICS AND GYNECOLOGY | Age: 44
End: 2019-11-07

## 2019-11-07 NOTE — TELEPHONE ENCOUNTER
How is patient supposed to use the testosterone cream?  She forgot.  Per rx order pea sized amount to clitoris at hs, however, patient thinks you told her to use nightly for a week and then only 3 days per week.  Please advise?

## 2020-08-17 ENCOUNTER — OFFICE VISIT (OUTPATIENT)
Dept: OBSTETRICS AND GYNECOLOGY | Age: 45
End: 2020-08-17

## 2020-08-17 VITALS
SYSTOLIC BLOOD PRESSURE: 112 MMHG | HEIGHT: 65 IN | DIASTOLIC BLOOD PRESSURE: 72 MMHG | BODY MASS INDEX: 20.43 KG/M2 | WEIGHT: 122.6 LBS

## 2020-08-17 DIAGNOSIS — Z71.6 ENCOUNTER FOR SMOKING CESSATION COUNSELING: ICD-10-CM

## 2020-08-17 DIAGNOSIS — Z12.31 SCREENING MAMMOGRAM, ENCOUNTER FOR: ICD-10-CM

## 2020-08-17 DIAGNOSIS — F17.200 SMOKER UNMOTIVATED TO QUIT: ICD-10-CM

## 2020-08-17 DIAGNOSIS — Z01.419 WELL WOMAN EXAM WITH ROUTINE GYNECOLOGICAL EXAM: Primary | ICD-10-CM

## 2020-08-17 DIAGNOSIS — Z01.419 WELL FEMALE EXAM WITH ROUTINE GYNECOLOGICAL EXAM: ICD-10-CM

## 2020-08-17 DIAGNOSIS — Z12.4 SCREENING FOR MALIGNANT NEOPLASM OF THE CERVIX: ICD-10-CM

## 2020-08-17 DIAGNOSIS — E78.5 DYSLIPIDEMIA: ICD-10-CM

## 2020-08-17 PROBLEM — N93.9 ABNORMAL UTERINE BLEEDING (AUB): Status: RESOLVED | Noted: 2018-05-23 | Resolved: 2020-08-17

## 2020-08-17 LAB
BILIRUB BLD-MCNC: NEGATIVE MG/DL
GLUCOSE UR STRIP-MCNC: NEGATIVE MG/DL
KETONES UR QL: NEGATIVE
LEUKOCYTE EST, POC: NEGATIVE
NITRITE UR-MCNC: NEGATIVE MG/ML
PH UR: 6 [PH] (ref 5–8)
PROT UR STRIP-MCNC: NEGATIVE MG/DL
RBC # UR STRIP: ABNORMAL /UL
SP GR UR: 1 (ref 1–1.03)
UROBILINOGEN UR QL: NORMAL

## 2020-08-17 PROCEDURE — 99396 PREV VISIT EST AGE 40-64: CPT | Performed by: OBSTETRICS & GYNECOLOGY

## 2020-08-17 PROCEDURE — 81003 URINALYSIS AUTO W/O SCOPE: CPT | Performed by: OBSTETRICS & GYNECOLOGY

## 2020-08-17 NOTE — PROGRESS NOTES
Routine Annual Visit    2020    Patient: Kathleen Villanueva          MR#:[id number]      History of Present Illness    Chief Complaint   Patient presents with   • Gynecologic Exam     PAP- 19, MG- 19 pt well, no c/o       44 y.o. female  who presents for annual exam.    The patient presents for routine annual exam feeling well without any complaints.    Patient's last menstrual period was 2018 (lmp unknown).  Obstetric History:  OB History        3    Para   3    Term   3            AB        Living   3       SAB        TAB        Ectopic        Molar        Multiple        Live Births   3               Menstrual History:     Patient's last menstrual period was 2018 (lmp unknown).       Sexual History:       ________________________________________  Patient Active Problem List   Diagnosis   • Well female exam with routine gynecological exam   • Smoker unmotivated to quit   • Dyslipidemia       Past Medical History:   Diagnosis Date   • Abnormal Pap smear of cervix    • Asthma    • DDD (degenerative disc disease), thoracolumbar    • Degenerative disc disease, lumbar    • Depression    • Dysmenorrhea    • Elevated cholesterol    • Endomyometritis    • Genital warts    • Hx of renal calculi    • Irregular heart beat    • Menorrhagia    • Sleep apnea     NO CPAP USE       Past Surgical History:   Procedure Laterality Date   • BREAST BIOPSY      Left Breast Benign    • TOTAL LAPAROSCOPIC HYSTERECTOMY SALPINGO OOPHORECTOMY Bilateral 2018    Procedure: TOTAL LAPAROSCOPIC HYSTERECTOMY BILATERAL SALPINGECTOMY  CYSTOSCOPY;  Surgeon: Henri Painter MD;  Location: [facility];  Service: Obstetrics/Gynecology   • WISDOM TOOTH EXTRACTION         Social History     Tobacco Use   Smoking Status Current Every Day Smoker   • Packs/day: 1.00   • Years: 30.00   • Pack years: 30.00   • Types: Cigarettes   Smokeless Tobacco Never Used       Family  History   Problem Relation Age of Onset   • Hypertension Mother    • Heart disease Mother    • Asthma Mother    • Diabetes Mother    • Colon cancer Mother 26   • Melanoma Mother         mole on ankle   • Brain cancer Mother         unsure of age   • Cervical cancer Mother         unsure of age   • Stroke Father    • Hypertension Father    • Breast cancer Neg Hx    • Ovarian cancer Neg Hx    • Uterine cancer Neg Hx    • Prostate cancer Neg Hx    • Malig Hyperthermia Neg Hx        Prior to Admission medications    Medication Sig Start Date End Date Taking? Authorizing Provider   atorvastatin (LIPITOR) 20 MG tablet Take 20 mg by mouth Daily.   Yes Antoinette Ross MD   escitalopram (LEXAPRO) 10 MG tablet Take 10 mg by mouth Daily. 6/7/19  Yes Antoinette Ross MD   fexofenadine-pseudoephedrine (ALLEGRA-D 24) 180-240 MG per 24 hr tablet Take 1 tablet by mouth As Needed.   Yes Antoinette Ross MD   hydrochlorothiazide (MICROZIDE) 12.5 MG capsule Take 12.5 mg by mouth Daily As Needed. 5/8/19  Yes Antoinette Ross MD   Metoprolol Succinate 25 MG capsule extended-release 24 hour sprinkle Take  by mouth Daily As Needed.   Yes Antoinette Ross MD   montelukast (SINGULAIR) 10 MG tablet Take 10 mg by mouth every night at bedtime. 6/7/19  Yes Antoinette Ross MD   VENTOLIN  (90 Base) MCG/ACT inhaler Inhale 2 puffs Every 4 (Four) Hours As Needed. 6/20/17  Yes Antoinette Ross MD     ________________________________________    Current contraception: status post hysterectomy  History of abnormal Pap smear: no  Family history of uterine or ovarian cancer: no  Family History of colon cancer/colon polyps: no  History of abnormal mammogram: no  History of abnormal lipids: yes - treated    The following portions of the patient's history were reviewed and updated as appropriate: allergies, current medications, past family history, past medical history, past social history, past surgical history  "and problem list.    Review of Systems    Pertinent items are noted in HPI.     Objective   Physical Exam    /72   Ht 165.1 cm (65\")   Wt 55.6 kg (122 lb 9.6 oz)   LMP 06/12/2018 (LMP Unknown)   BMI 20.40 kg/m²    BP Readings from Last 3 Encounters:   08/17/20 112/72   10/30/19 100/76   08/12/19 102/76      Wt Readings from Last 3 Encounters:   08/17/20 55.6 kg (122 lb 9.6 oz)   10/30/19 53.1 kg (117 lb)   08/12/19 51.3 kg (113 lb)        BMI: Estimated body mass index is 20.4 kg/m² as calculated from the following:    Height as of this encounter: 165.1 cm (65\").    Weight as of this encounter: 55.6 kg (122 lb 9.6 oz).       General: alert, appears stated age and cooperative   Heart: regular rate and rhythm, S1, S2 normal, no murmur, click, rub or gallop   Lungs: clear to auscultation bilaterally   Abdomen: soft, non-tender, without masses, no organomegaly   Breast: inspection negative, no nipple discharge or bleeding, no masses or nodularity palpable   Vulva: normal and bartholin's, Urethra, [place]'s normal   Vagina: normal mucosa, normal discharge   Cervix: absent   Uterus: absent    Adnexa: normal adnexa     As part of wellness and prevention, the following topics were discussed with the patient:  Encouraged self breast exam  Physical activity and regular exercised encouraged.   Smoking cessation discussed.    Patient is a smoker.      Assessment:    Kathleen was seen today for gynecologic exam.    Diagnoses and all orders for this visit:    Well woman exam with routine gynecological exam  -     POC Urinalysis Dipstick, Automated    Smoker unmotivated to quit    Dyslipidemia    Screening mammogram, encounter for  -     Mammo Screening Bilateral With CAD; Future    Encounter for smoking cessation counseling  -     varenicline (CHANTIX CHARU) 0.5 MG X 11 & 1 MG X 42 tablet; Take 0.5 mg one daily on days 1-3 and and 0.5 mg twice daily on days 4-7.Then 1 mg twice daily for a total of 12 weeks.    I had a " 4-minute discussion with the patient regarding smoking cessation.  Discussed her increased risk of coronary artery disease and lung cancer.  Cessation is strongly encouraged.  The patient reports trying in the past and not being successful.  Chantix was discussed as a cessation aid and the patient is willing to try it.  Discussed side effects of the medication.      Plan:  Return in about 1 year (around 8/17/2021) for Annual GYN exam.      Henri Painter MD  8/17/2020 08:45

## 2020-08-19 LAB
CONV .: NORMAL
CYTOLOGIST CVX/VAG CYTO: NORMAL
CYTOLOGY CVX/VAG DOC CYTO: NORMAL
CYTOLOGY CVX/VAG DOC THIN PREP: NORMAL
DX ICD CODE: NORMAL
HIV 1 & 2 AB SER-IMP: NORMAL
OTHER STN SPEC: NORMAL
STAT OF ADQ CVX/VAG CYTO-IMP: NORMAL

## 2020-08-19 RX ORDER — METRONIDAZOLE 500 MG/1
500 TABLET ORAL 2 TIMES DAILY
Qty: 14 TABLET | Refills: 0 | Status: SHIPPED | OUTPATIENT
Start: 2020-08-19 | End: 2020-08-26

## 2020-08-19 NOTE — PROGRESS NOTES
Call pt:  PAP is negative.    There is a shift in bacterial population on the Pap suggesting bacterial vaginosis    Rx is sent to the pharmacy

## 2020-08-22 PROCEDURE — 99284 EMERGENCY DEPT VISIT MOD MDM: CPT

## 2020-08-22 PROCEDURE — 93005 ELECTROCARDIOGRAM TRACING: CPT

## 2020-08-22 PROCEDURE — 93005 ELECTROCARDIOGRAM TRACING: CPT | Performed by: EMERGENCY MEDICINE

## 2020-08-22 PROCEDURE — 93010 ELECTROCARDIOGRAM REPORT: CPT | Performed by: INTERNAL MEDICINE

## 2020-08-23 ENCOUNTER — HOSPITAL ENCOUNTER (EMERGENCY)
Facility: HOSPITAL | Age: 45
Discharge: HOME | End: 2020-08-23
Attending: EMERGENCY MEDICINE | Admitting: EMERGENCY MEDICINE

## 2020-08-23 ENCOUNTER — APPOINTMENT (OUTPATIENT)
Dept: GENERAL RADIOLOGY | Facility: HOSPITAL | Age: 45
End: 2020-08-23

## 2020-08-23 VITALS
TEMPERATURE: 98.6 F | HEIGHT: 65 IN | OXYGEN SATURATION: 95 % | RESPIRATION RATE: 16 BRPM | DIASTOLIC BLOOD PRESSURE: 78 MMHG | HEART RATE: 77 BPM | SYSTOLIC BLOOD PRESSURE: 141 MMHG | WEIGHT: 122.6 LBS | BODY MASS INDEX: 20.43 KG/M2

## 2020-08-23 DIAGNOSIS — R00.2 PALPITATIONS: Primary | ICD-10-CM

## 2020-08-23 LAB
ALBUMIN SERPL-MCNC: 4.7 G/DL (ref 3.5–5.2)
ALBUMIN/GLOB SERPL: 2 G/DL
ALP SERPL-CCNC: 74 U/L (ref 39–117)
ALT SERPL W P-5'-P-CCNC: 15 U/L (ref 1–33)
ANION GAP SERPL CALCULATED.3IONS-SCNC: 14.4 MMOL/L (ref 5–15)
AST SERPL-CCNC: 13 U/L (ref 1–32)
BASOPHILS # BLD AUTO: 0.11 10*3/MM3 (ref 0–0.2)
BASOPHILS NFR BLD AUTO: 0.7 % (ref 0–1.5)
BILIRUB SERPL-MCNC: 0.3 MG/DL (ref 0–1.2)
BUN SERPL-MCNC: 14 MG/DL (ref 6–20)
BUN/CREAT SERPL: 16.3 (ref 7–25)
CALCIUM SPEC-SCNC: 9.7 MG/DL (ref 8.6–10.5)
CHLORIDE SERPL-SCNC: 97 MMOL/L (ref 98–107)
CO2 SERPL-SCNC: 24.6 MMOL/L (ref 22–29)
CREAT SERPL-MCNC: 0.86 MG/DL (ref 0.57–1)
DEPRECATED RDW RBC AUTO: 42 FL (ref 37–54)
EOSINOPHIL # BLD AUTO: 0.16 10*3/MM3 (ref 0–0.4)
EOSINOPHIL NFR BLD AUTO: 1 % (ref 0.3–6.2)
ERYTHROCYTE [DISTWIDTH] IN BLOOD BY AUTOMATED COUNT: 13 % (ref 12.3–15.4)
GFR SERPL CREATININE-BSD FRML MDRD: 71 ML/MIN/1.73
GLOBULIN UR ELPH-MCNC: 2.3 GM/DL
GLUCOSE SERPL-MCNC: 103 MG/DL (ref 65–99)
HCT VFR BLD AUTO: 45.9 % (ref 34–46.6)
HGB BLD-MCNC: 15.9 G/DL (ref 12–15.9)
HOLD SPECIMEN: NORMAL
HOLD SPECIMEN: NORMAL
IMM GRANULOCYTES # BLD AUTO: 0.07 10*3/MM3 (ref 0–0.05)
IMM GRANULOCYTES NFR BLD AUTO: 0.5 % (ref 0–0.5)
LYMPHOCYTES # BLD AUTO: 3.2 10*3/MM3 (ref 0.7–3.1)
LYMPHOCYTES NFR BLD AUTO: 20.9 % (ref 19.6–45.3)
MAGNESIUM SERPL-MCNC: 1.9 MG/DL (ref 1.6–2.6)
MCH RBC QN AUTO: 30.6 PG (ref 26.6–33)
MCHC RBC AUTO-ENTMCNC: 34.6 G/DL (ref 31.5–35.7)
MCV RBC AUTO: 88.4 FL (ref 79–97)
MONOCYTES # BLD AUTO: 1.21 10*3/MM3 (ref 0.1–0.9)
MONOCYTES NFR BLD AUTO: 7.9 % (ref 5–12)
NEUTROPHILS NFR BLD AUTO: 10.55 10*3/MM3 (ref 1.7–7)
NEUTROPHILS NFR BLD AUTO: 69 % (ref 42.7–76)
NRBC BLD AUTO-RTO: 0 /100 WBC (ref 0–0.2)
PLATELET # BLD AUTO: 330 10*3/MM3 (ref 140–450)
PMV BLD AUTO: 11 FL (ref 6–12)
POTASSIUM SERPL-SCNC: 3.6 MMOL/L (ref 3.5–5.2)
PROT SERPL-MCNC: 7 G/DL (ref 6–8.5)
RBC # BLD AUTO: 5.19 10*6/MM3 (ref 3.77–5.28)
SODIUM SERPL-SCNC: 136 MMOL/L (ref 136–145)
TROPONIN T SERPL-MCNC: <0.01 NG/ML (ref 0–0.03)
TROPONIN T SERPL-MCNC: <0.01 NG/ML (ref 0–0.03)
TSH SERPL DL<=0.05 MIU/L-ACNC: 4.9 UIU/ML (ref 0.27–4.2)
WBC # BLD AUTO: 15.3 10*3/MM3 (ref 3.4–10.8)
WHOLE BLOOD HOLD SPECIMEN: NORMAL
WHOLE BLOOD HOLD SPECIMEN: NORMAL

## 2020-08-23 PROCEDURE — 71046 X-RAY EXAM CHEST 2 VIEWS: CPT

## 2020-08-23 PROCEDURE — 84484 ASSAY OF TROPONIN QUANT: CPT | Performed by: EMERGENCY MEDICINE

## 2020-08-23 PROCEDURE — 84484 ASSAY OF TROPONIN QUANT: CPT

## 2020-08-23 PROCEDURE — 80050 GENERAL HEALTH PANEL: CPT

## 2020-08-23 PROCEDURE — 83735 ASSAY OF MAGNESIUM: CPT | Performed by: PHYSICIAN ASSISTANT

## 2020-08-23 RX ORDER — ASPIRIN 325 MG
325 TABLET ORAL ONCE
Status: DISCONTINUED | OUTPATIENT
Start: 2020-08-23 | End: 2020-08-23 | Stop reason: HOSPADM

## 2020-08-23 RX ORDER — SODIUM CHLORIDE 0.9 % (FLUSH) 0.9 %
10 SYRINGE (ML) INJECTION AS NEEDED
Status: DISCONTINUED | OUTPATIENT
Start: 2020-08-23 | End: 2020-08-23 | Stop reason: HOSPADM

## 2020-08-23 NOTE — ED PROVIDER NOTES
MD ATTESTATION NOTE    The ANGELINA and I have discussed this patient's history, physical exam, and treatment plan.  I have reviewed the documentation and personally had a face to face interaction with the patient. I affirm the documentation and agree with the treatment and plan.  The attached note describes my personal findings.      Kathleen Villanueva is a 44 y.o. female who presents to the ED c/o palpitations.  This is been ongoing for years but has been worse over the past 2 weeks.  More frequent.  Her heart rate ranged from the 90s to 100s today for about 1 hour.  She denies having any shortness of breath or chest pain.  No fever.  She reports having a chronic cough.      On exam:  Delayed expiratory phase with wheezing bilaterally  Regular rate and rhythm  Abdomen soft and nontender    Labs  Recent Results (from the past 24 hour(s))   Comprehensive Metabolic Panel    Collection Time: 08/23/20  1:24 AM   Result Value Ref Range    Glucose 103 (H) 65 - 99 mg/dL    BUN 14 6 - 20 mg/dL    Creatinine 0.86 0.57 - 1.00 mg/dL    Sodium 136 136 - 145 mmol/L    Potassium 3.6 3.5 - 5.2 mmol/L    Chloride 97 (L) 98 - 107 mmol/L    CO2 24.6 22.0 - 29.0 mmol/L    Calcium 9.7 8.6 - 10.5 mg/dL    Total Protein 7.0 6.0 - 8.5 g/dL    Albumin 4.70 3.50 - 5.20 g/dL    ALT (SGPT) 15 1 - 33 U/L    AST (SGOT) 13 1 - 32 U/L    Alkaline Phosphatase 74 39 - 117 U/L    Total Bilirubin 0.3 0.0 - 1.2 mg/dL    eGFR Non African Amer 71 >60 mL/min/1.73    Globulin 2.3 gm/dL    A/G Ratio 2.0 g/dL    BUN/Creatinine Ratio 16.3 7.0 - 25.0    Anion Gap 14.4 5.0 - 15.0 mmol/L   Troponin    Collection Time: 08/23/20  1:24 AM   Result Value Ref Range    Troponin T <0.010 0.000 - 0.030 ng/mL   Light Blue Top    Collection Time: 08/23/20  1:24 AM   Result Value Ref Range    Extra Tube hold for add-on    Green Top (Gel)    Collection Time: 08/23/20  1:24 AM   Result Value Ref Range    Extra Tube Hold for add-ons.    Lavender Top    Collection Time: 08/23/20   1:24 AM   Result Value Ref Range    Extra Tube hold for add-on    Gold Top - SST    Collection Time: 08/23/20  1:24 AM   Result Value Ref Range    Extra Tube Hold for add-ons.    CBC Auto Differential    Collection Time: 08/23/20  1:24 AM   Result Value Ref Range    WBC 15.30 (H) 3.40 - 10.80 10*3/mm3    RBC 5.19 3.77 - 5.28 10*6/mm3    Hemoglobin 15.9 12.0 - 15.9 g/dL    Hematocrit 45.9 34.0 - 46.6 %    MCV 88.4 79.0 - 97.0 fL    MCH 30.6 26.6 - 33.0 pg    MCHC 34.6 31.5 - 35.7 g/dL    RDW 13.0 12.3 - 15.4 %    RDW-SD 42.0 37.0 - 54.0 fl    MPV 11.0 6.0 - 12.0 fL    Platelets 330 140 - 450 10*3/mm3    Neutrophil % 69.0 42.7 - 76.0 %    Lymphocyte % 20.9 19.6 - 45.3 %    Monocyte % 7.9 5.0 - 12.0 %    Eosinophil % 1.0 0.3 - 6.2 %    Basophil % 0.7 0.0 - 1.5 %    Immature Grans % 0.5 0.0 - 0.5 %    Neutrophils, Absolute 10.55 (H) 1.70 - 7.00 10*3/mm3    Lymphocytes, Absolute 3.20 (H) 0.70 - 3.10 10*3/mm3    Monocytes, Absolute 1.21 (H) 0.10 - 0.90 10*3/mm3    Eosinophils, Absolute 0.16 0.00 - 0.40 10*3/mm3    Basophils, Absolute 0.11 0.00 - 0.20 10*3/mm3    Immature Grans, Absolute 0.07 (H) 0.00 - 0.05 10*3/mm3    nRBC 0.0 0.0 - 0.2 /100 WBC   TSH    Collection Time: 08/23/20  1:24 AM   Result Value Ref Range    TSH 4.900 (H) 0.270 - 4.200 uIU/mL   Magnesium    Collection Time: 08/23/20  1:24 AM   Result Value Ref Range    Magnesium 1.9 1.6 - 2.6 mg/dL   Troponin    Collection Time: 08/23/20  3:09 AM   Result Value Ref Range    Troponin T <0.010 0.000 - 0.030 ng/mL       Radiology  Xr Chest 2 View    Result Date: 8/23/2020  PA AND LATERAL CHEST RADIOGRAPH  HISTORY: Chest pain  COMPARISON: None available.  FINDINGS: Heart size is within normal limits. Background emphysematous changes are noted. No pneumothorax, pleural effusion, or acute infiltrate is seen.      No acute findings.  This report was finalized on 8/23/2020 2:48 AM by Dr. Roxana Paris M.D.        Medical Decision Making:  ED Course as of Aug 23 0618    Sun Aug 23, 2020   0331 Patient presents with 2-week history of intermittent palpitations, chest pain.  Differential diagnoses include but not limited to benign palpitations, arrhythmia, ACS.    [EE]   0333 TSH Baseline(!): 4.900 [EE]   0333 Troponin T: <0.010 [EE]   0333 Magnesium: 1.9 [EE]   0333 WBC(!): 15.30 [EE]   0333 Creatinine: 0.86 [EE]   0333 Chest x-ray interpreted by myself shows no acute effusion, infiltrate.  Normal-sized heart.    [EE]   0333 EKG interpreted by myself.  Time 2350.  Sinus rhythm, 81 bpm.  Normal P/CLEMENT.  QRS normal with normal axis.  No significant ST abnormalities.  Similar to previous EKG from 6/12/2018.    [EE]   0415 Patient has a fairly benign work-up here.  No evidence of arrhythmia.  I suspect patient's palpitations may certainly be worsened by her 5 Mountain Dew's per day.  We have discussed that she will stop this as well as any decongestants.  We will have her follow-up with cardiology outpatient.    [EE]      ED Course User Index  [EE] Jose A Mon PA       No recent trauma/surgery/immobilization in the past 4 weeks. No h/o VTE. No hemoptysis. No unilateral leg swelling. No exogenous estrogen use.   Age < 50. HR < 100. SpO2 >94% on room air.    Smoking cessation counseling  I counseled the patient face to face > 3 minutes and < 10 minutes to quit the use of tobacco and provided tobacco cessation strategies.    PPE: Both the patient and I wore a surgical mask throughout the entire patient encounter. I wore protective goggles.     Diagnosis  Final diagnoses:   Palpitations        Kevin Kumar II, MD  08/23/20 0618

## 2020-08-23 NOTE — ED PROVIDER NOTES
EMERGENCY DEPARTMENT ENCOUNTER    Room Number:  04/04  Date of encounter:  8/23/2020  PCP: Liv Castro APRN  Historian: Patient      I used full protective equipment while examining this patient.  This includes face mask, gloves and protective eyewear.  I washed my hands before entering the room and immediately upon leaving the room      HPI:  Chief Complaint: Palpitations  A complete HPI/ROS/PMH/PSH/SH/FH are unobtainable due to: Nothing    Context: Kathleen Villanueva is a 44 y.o. female who presents to the ED c/o 2-week history of intermittent palpitations, and mild diffuse left-sided chest pain.  Patient states the symptoms happen sporadically without any significant precipitating factors.  Patient states that sometimes they seem to occur with exertion.  Patient states that left-sided chest pain is mild and pressure-like.  She denies any radiation of her neck or down her arm.  Patient states she has chronic shortness of breath from smoking.  Patient does admit to drinking 5 Mountain Dews per day at a minimum.    Review of Medical Records  I reviewed patient's last GYN visit from 8/17/2020.  Patient treated for routine screening.    PAST MEDICAL HISTORY  Active Ambulatory Problems     Diagnosis Date Noted   • Well female exam with routine gynecological exam 09/13/2017   • Smoker unmotivated to quit 09/13/2017   • Dyslipidemia 08/12/2019     Resolved Ambulatory Problems     Diagnosis Date Noted   • Depo-Provera contraceptive status 09/13/2017   • Abnormal uterine bleeding (AUB) 05/23/2018     Past Medical History:   Diagnosis Date   • Abnormal Pap smear of cervix 2007   • Asthma    • DDD (degenerative disc disease), thoracolumbar    • Degenerative disc disease, lumbar    • Depression    • Dysmenorrhea 2008   • Elevated cholesterol    • Endomyometritis 2007   • Genital warts 1999   • Hx of renal calculi    • Irregular heart beat 2007   • Menorrhagia 2008   • Sleep apnea          PAST SURGICAL HISTORY  Past  Surgical History:   Procedure Laterality Date   • BREAST BIOPSY  2014    Left Breast Benign    • TOTAL LAPAROSCOPIC HYSTERECTOMY SALPINGO OOPHORECTOMY Bilateral 6/14/2018    Procedure: TOTAL LAPAROSCOPIC HYSTERECTOMY BILATERAL SALPINGECTOMY  CYSTOSCOPY;  Surgeon: Henri Painter MD;  Location: [facility] MAIN OR;  Service: Obstetrics/Gynecology   • WISDOM TOOTH EXTRACTION  2007         FAMILY HISTORY  Family History   Problem Relation Age of Onset   • Hypertension Mother    • Heart disease Mother    • Asthma Mother    • Diabetes Mother    • Colon cancer Mother 26   • Melanoma Mother         mole on ankle   • Brain cancer Mother         unsure of age   • Cervical cancer Mother         unsure of age   • Stroke Father    • Hypertension Father    • Breast cancer Neg Hx    • Ovarian cancer Neg Hx    • Uterine cancer Neg Hx    • Prostate cancer Neg Hx    • Malig Hyperthermia Neg Hx          SOCIAL HISTORY  Social History     Socioeconomic History   • Marital status: Single     Spouse name: Not on file   • Number of children: 3   • Years of education: ged   • Highest education level: Not on file   Occupational History   • 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       PHYSICAL EXAM    I have reviewed the triage vital signs and nursing notes.    ED Triage Vitals [08/22/20 2338]   Temp Heart Rate Resp BP SpO2   98.6 °F (37 °C) 95 17 128/87 98 %      Temp src Heart Rate Source Patient Position BP Location FiO2 (%)   Tympanic Monitor --  -- --       Physical Exam  GENERAL: Alert, oriented, not distressed  HENT: nares patent, head atraumatic  EYES: no scleral icterus, EOMI  CV: regular rhythm, regular rate, no murmur  RESPIRATORY: normal effort, wheezing in lower lung fields  ABDOMEN: soft, nontender  MUSCULOSKELETAL: no deformity, FROM, no calf swelling or tenderness  NEURO: alert, moves all extremities, follows commands  SKIN: warm, dry        LAB RESULTS  Recent Results (from the past 24 hour(s))   Comprehensive Metabolic Panel    Collection Time: 08/23/20  1:24 AM   Result Value Ref Range    Glucose 103 (H) 65 - 99 mg/dL    BUN 14 6 - 20 mg/dL    Creatinine 0.86 0.57 - 1.00 mg/dL    Sodium 136 136 - 145 mmol/L    Potassium 3.6 3.5 - 5.2 mmol/L    Chloride 97 (L) 98 - 107 mmol/L    CO2 24.6 22.0 - 29.0 mmol/L    Calcium 9.7 8.6 - 10.5 mg/dL    Total Protein 7.0 6.0 - 8.5 g/dL    Albumin 4.70 3.50 - 5.20 g/dL    ALT (SGPT) 15 1 - 33 U/L    AST (SGOT) 13 1 - 32 U/L    Alkaline Phosphatase 74 39 - 117 U/L    Total Bilirubin 0.3 0.0 - 1.2 mg/dL    eGFR Non African Amer 71 >60 mL/min/1.73    Globulin 2.3 gm/dL    A/G Ratio 2.0 g/dL    BUN/Creatinine Ratio 16.3 7.0 - 25.0    Anion Gap 14.4 5.0 - 15.0 mmol/L   Troponin    Collection Time: 08/23/20  1:24 AM   Result Value Ref Range    Troponin T <0.010 0.000 - 0.030 ng/mL   Light Blue Top    Collection Time: 08/23/20  1:24 AM   Result Value Ref Range    Extra Tube hold for add-on    Green Top (Gel)    Collection Time: 08/23/20  1:24 AM   Result Value Ref Range    Extra Tube Hold for add-ons.    Lavender Top    Collection Time: 08/23/20  1:24 AM   Result Value Ref Range    Extra Tube hold for add-on    Gold Top - SST    Collection Time: 08/23/20  1:24 AM   Result Value Ref Range    Extra Tube Hold for add-ons.    CBC Auto Differential    Collection Time: 08/23/20  1:24 AM   Result Value Ref Range    WBC 15.30 (H) 3.40 - 10.80 10*3/mm3    RBC 5.19 3.77 - 5.28 10*6/mm3    Hemoglobin 15.9 12.0 - 15.9 g/dL     Hematocrit 45.9 34.0 - 46.6 %    MCV 88.4 79.0 - 97.0 fL    MCH 30.6 26.6 - 33.0 pg    MCHC 34.6 31.5 - 35.7 g/dL    RDW 13.0 12.3 - 15.4 %    RDW-SD 42.0 37.0 - 54.0 fl    MPV 11.0 6.0 - 12.0 fL    Platelets 330 140 - 450 10*3/mm3    Neutrophil % 69.0 42.7 - 76.0 %    Lymphocyte % 20.9 19.6 - 45.3 %    Monocyte % 7.9 5.0 - 12.0 %    Eosinophil % 1.0 0.3 - 6.2 %    Basophil % 0.7 0.0 - 1.5 %    Immature Grans % 0.5 0.0 - 0.5 %    Neutrophils, Absolute 10.55 (H) 1.70 - 7.00 10*3/mm3    Lymphocytes, Absolute 3.20 (H) 0.70 - 3.10 10*3/mm3    Monocytes, Absolute 1.21 (H) 0.10 - 0.90 10*3/mm3    Eosinophils, Absolute 0.16 0.00 - 0.40 10*3/mm3    Basophils, Absolute 0.11 0.00 - 0.20 10*3/mm3    Immature Grans, Absolute 0.07 (H) 0.00 - 0.05 10*3/mm3    nRBC 0.0 0.0 - 0.2 /100 WBC   TSH    Collection Time: 08/23/20  1:24 AM   Result Value Ref Range    TSH 4.900 (H) 0.270 - 4.200 uIU/mL   Magnesium    Collection Time: 08/23/20  1:24 AM   Result Value Ref Range    Magnesium 1.9 1.6 - 2.6 mg/dL   Troponin    Collection Time: 08/23/20  3:09 AM   Result Value Ref Range    Troponin T <0.010 0.000 - 0.030 ng/mL       Ordered the above labs and independently reviewed the results.        RADIOLOGY  Xr Chest 2 View    Result Date: 8/23/2020  PA AND LATERAL CHEST RADIOGRAPH  HISTORY: Chest pain  COMPARISON: None available.  FINDINGS: Heart size is within normal limits. Background emphysematous changes are noted. No pneumothorax, pleural effusion, or acute infiltrate is seen.      No acute findings.  This report was finalized on 8/23/2020 2:48 AM by Dr. Roxana Paris M.D.        I ordered the above noted radiological studies. Reviewed by me and discussed with radiologist.  See dictation for official radiology interpretation.    MEDICATIONS GIVEN IN ER    Medications   sodium chloride 0.9 % flush 10 mL (has no administration in time range)   aspirin tablet 325 mg (325 mg Oral Not Given 8/23/20 0232)         PROGRESS, DATA ANALYSIS,  CONSULTS, AND MEDICAL DECISION MAKING    All labs have been independently reviewed by me.  All radiology studies have been reviewed by me and discussed with radiologist dictating the report.   EKG's independently viewed and interpreted by me.  Discussion below represents my analysis of pertinent findings related to patient's condition, differential diagnosis, treatment plan and final disposition.    I have discussed case with Dr. Kumar, emergency room physician.  He has performed his own bedside examination and agrees with treatment plan.    ED Course as of Aug 23 0602   Sun Aug 23, 2020   0331 Patient presents with 2-week history of intermittent palpitations, chest pain.  Differential diagnoses include but not limited to benign palpitations, arrhythmia, ACS.    [EE]   0333 TSH Baseline(!): 4.900 [EE]   0333 Troponin T: <0.010 [EE]   0333 Magnesium: 1.9 [EE]   0333 WBC(!): 15.30 [EE]   0333 Creatinine: 0.86 [EE]   0333 Chest x-ray interpreted by myself shows no acute effusion, infiltrate.  Normal-sized heart.    [EE]   0333 EKG interpreted by myself.  Time 2350.  Sinus rhythm, 81 bpm.  Normal P/CLEMENT.  QRS normal with normal axis.  No significant ST abnormalities.  Similar to previous EKG from 6/12/2018.    [EE]   0415 Patient has a fairly benign work-up here.  No evidence of arrhythmia.  I suspect patient's palpitations may certainly be worsened by her 5 Mountain Dew's per day.  We have discussed that she will stop this as well as any decongestants.  We will have her follow-up with cardiology outpatient.    [EE]      ED Course User Index  [EE] Jose A Mon PA       AS OF 06:02 VITALS:    BP - 141/78  HR - 77  TEMP - 98.6 °F (37 °C) (Tympanic)  O2 SATS - 95%        DIAGNOSIS  Final diagnoses:   Palpitations         DISPOSITION  Discharged           Jose A Mon PA  08/23/20 0602

## 2020-08-23 NOTE — ED TRIAGE NOTES
Pt reports chest fluttering and palpitations for the past 2 weeks. Pt saw her pcp yesterday and was told the she may need to go into the hospital soon for further work up. Pt reports neck and shoulder discomfort currently rating 2/10.     Pt placed in mask and staff wearing appropriate ppe at the time of pt arrival.

## 2020-08-26 ENCOUNTER — OFFICE VISIT (OUTPATIENT)
Dept: CARDIOLOGY | Age: 45
End: 2020-08-26

## 2020-08-26 VITALS
HEART RATE: 95 BPM | BODY MASS INDEX: 17 KG/M2 | SYSTOLIC BLOOD PRESSURE: 102 MMHG | HEIGHT: 65 IN | DIASTOLIC BLOOD PRESSURE: 62 MMHG | WEIGHT: 102 LBS

## 2020-08-26 DIAGNOSIS — Z72.0 TOBACCO USE: ICD-10-CM

## 2020-08-26 DIAGNOSIS — R00.2 PALPITATIONS: ICD-10-CM

## 2020-08-26 DIAGNOSIS — R94.31 ABNORMAL EKG: ICD-10-CM

## 2020-08-26 DIAGNOSIS — F15.10 CAFFEINE ABUSE (HCC): ICD-10-CM

## 2020-08-26 DIAGNOSIS — Z82.49 FH ISCHEMIC HEART DISEASE: ICD-10-CM

## 2020-08-26 DIAGNOSIS — R07.2 PRECORDIAL PAIN: Primary | ICD-10-CM

## 2020-08-26 PROCEDURE — 99203 OFFICE O/P NEW LOW 30 MIN: CPT | Performed by: INTERNAL MEDICINE

## 2020-08-26 PROCEDURE — 93000 ELECTROCARDIOGRAM COMPLETE: CPT | Performed by: INTERNAL MEDICINE

## 2020-08-26 RX ORDER — BISOPROLOL FUMARATE AND HYDROCHLOROTHIAZIDE 5; 6.25 MG/1; MG/1
1 TABLET ORAL DAILY
Qty: 30 TABLET | Refills: 6 | Status: SHIPPED | OUTPATIENT
Start: 2020-08-26 | End: 2020-10-02

## 2020-08-26 RX ORDER — CETIRIZINE HYDROCHLORIDE 10 MG/1
10 TABLET ORAL DAILY
COMMUNITY

## 2020-08-26 NOTE — PROGRESS NOTES
NEW PATIENT, REFERRED BY SARAH MCDONALD.  CHEST PAIN   Subjective:        [facility]  Cardiology Consult Note    Patient Identification:  Name: Kathleen Villanueva  Age: 44 y.o.  Sex: female  :  1975  MRN: [id number]             CC  HEART BEAT FAST, WITH SOB    BP HIGH  SMOKES 1 PPD    CAFFIENE ABUSE        History of Present Illness:   44 years old female here for the cardiac evaluation as well as establishment of the care as the patient has been complaining of sudden onset of fast heartbeats associated with shortness of breath moderate in intensity intermittent over the several weeks        Comorbid cardiac risk factors:     Past Medical History:  Past Medical History:   Diagnosis Date   • Abnormal Pap smear of cervix    • Asthma    • DDD (degenerative disc disease), thoracolumbar    • Degenerative disc disease, lumbar    • Depression    • Dysmenorrhea    • Elevated cholesterol    • Endomyometritis    • Genital warts    • Hx of renal calculi    • Irregular heart beat    • Menorrhagia    • Sleep apnea     NO CPAP USE     Past Surgical History:  Past Surgical History:   Procedure Laterality Date   • BREAST BIOPSY      Left Breast Benign    • TOTAL LAPAROSCOPIC HYSTERECTOMY SALPINGO OOPHORECTOMY Bilateral 2018    Procedure: TOTAL LAPAROSCOPIC HYSTERECTOMY BILATERAL SALPINGECTOMY  CYSTOSCOPY;  Surgeon: Henri Painter MD;  Location: [facility];  Service: Obstetrics/Gynecology   • WISDOM TOOTH EXTRACTION        Allergies:  Allergies   Allergen Reactions   • Ultram [Tramadol] Itching and Irritability     Home Meds:    (Not in a hospital admission)  Current Meds:   [unfilled]  Social History:   Social History     Tobacco Use   • Smoking status: Current Every Day Smoker     Packs/day: 1.00     Years: 30.00     Pack years: 30.00     Types: Cigarettes   • Smokeless tobacco: Never Used   Substance Use Topics   • Alcohol use: Yes     Comment: rare      Family  History:  Family History   Problem Relation Age of Onset   • Hypertension Mother    • Heart disease Mother    • Asthma Mother    • Diabetes Mother    • Colon cancer Mother 26   • Melanoma Mother         mole on ankle   • Brain cancer Mother         unsure of age   • Cervical cancer Mother         unsure of age   • Stroke Father    • Hypertension Father    • Breast cancer Neg Hx    • Ovarian cancer Neg Hx    • Uterine cancer Neg Hx    • Prostate cancer Neg Hx    • Malig Hyperthermia Neg Hx         Review of Systems    Constitutional: No weakness,fatigue, fever, rigors, chills   Eyes: No vision changes, eye pain   ENT/oropharynx: No difficulty swallowing, sore throat, epistaxis, changes in hearing   Cardiovascular:  Palpitation   Respiratory: No shortness of breath, dyspnea on exertion, cough, wheezing hemoptysis   Gastrointestinal: No abdominal pain, nausea, vomiting, diarrhea, bloody stools   Genitourinary: No hematuria, dysuria   Neurological: No headache, tremors, numbness,  one-sided weakness    Musculoskeletal: No cramps, myalgias,  joint pain, joint swelling   Integument: No rash, edema           Constitutional:  Heart Rate:  [95] 95  BP: (102)/(62) 102/62    Physical Exam   General:  Appears in no acute distress  Eyes: PERTL,  HEENT:  No JVD. Thyroid not visibly enlarged. No mucosal pallor or cyanosis  Respiratory: Respirations regular and unlabored at rest. BBS with good air entry in all fields. No crackles, rubs or wheezes auscultated  Cardiovascular: S1S2 Regular rate and rhythm. No murmur, rub or gallop auscultated. No carotid bruits. DP/PT pulses    . No pretibial pitting edema  Gastrointestinal: Abdomen soft, flat, non tender. Bowel sounds present. No hepatosplenomegaly. No ascites  Musculoskeletal: LONG x4. No abnormal movements  Extremities: No digital clubbing or cyanosis  Skin: Color pink. Skin warm and dry to touch. No rashes  No xanthoma  Neuro: AAO x3 CN II-XII grossly intact            ECG 12  Lead  Date/Time: 8/26/2020 4:20 PM  Performed by: Martin Lara MD  Authorized by: Martin Lara MD   Comparison: compared with previous ECG   Similar to previous ECG  Rhythm: sinus rhythm  ST Flattening: all  T flattening: all    Clinical impression: abnormal EKG                Cardiographics  ECG:     Telemetry:    Echocardiogram:     Imaging  Chest X-ray:     Lab Review   Results from last 7 days   Lab Units 08/23/20  0309 08/23/20  0124   TROPONIN T ng/mL <0.010 <0.010     Results from last 7 days   Lab Units 08/23/20  0124   MAGNESIUM mg/dL 1.9     Results from last 7 days   Lab Units 08/23/20  0124   SODIUM mmol/L 136   POTASSIUM mmol/L 3.6   BUN mg/dL 14   CREATININE mg/dL 0.86   CALCIUM mg/dL 9.7     @[id number]@  Results from last 7 days   Lab Units 08/23/20  0124   WBC 10*3/mm3 15.30*   HEMOGLOBIN g/dL 15.9   HEMATOCRIT % 45.9   PLATELETS 10*3/mm3 330             Assessment:/ Recommendations / Plan:   Patient Active Problem List   Diagnosis   • Well female exam with routine gynecological exam   • Smoker unmotivated to quit   • Dyslipidemia                    ICD-10-CM ICD-9-CM   1. Palpitations R00.2 785.1   2. Precordial pain R07.2 786.51   3. Tobacco use Z72.0 305.1   4. 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 FH ischemic heart disease Z82.49 V17.3     1. Palpitations  Intermittent will need to further work-up    Considering the patient's symptoms as well as clinical situation and  EKG findings, along with cardiac risk factors, ischemic workup is necessary to rule out ischemic cardiomyopathy, stress induced arrhythmias, and functional capacity for diagnosis as well as prognostic consideration        2. Precordial pain  Considering patient's medical condition as well as the risk factors, patient will require echocardiogram for further evaluation for the LV function, four-chamber evaluation, including the pressures, valvular function and  pericardial disease and pericardial  effusion      3. Tobacco use  Counseling has been done    4. 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 FH ischemic heart disease      STRESS CARDIOLYTE, , ECHO, HOLTER    SEE IN 1 MONTH  Labs/tests ordered for am      Martin Lara MD  8/26/2020, 16:17      EMR Dragon/Transcription:   Dictated utilizing Dragon dictation

## 2020-09-01 ENCOUNTER — TELEPHONE (OUTPATIENT)
Dept: CARDIOLOGY | Facility: CLINIC | Age: 45
End: 2020-09-01

## 2020-09-01 NOTE — TELEPHONE ENCOUNTER
Pt called states she was taking Metoprolol tart 25 Bid and HCTZ12.5 mg    Dr. FLOWER D/MUNDO and started pt on Bisoprolol/ hctz 5/6.25mg     Pt states since starting Ziac she has been very fatigued and and dizzy  Pt states her bp 122/85  74     Per Dr FLOWER ok to take half tab for couple weeks and if ok take whole tab    S/w pt she verbalized understanding

## 2020-09-09 DIAGNOSIS — R94.31 ABNORMAL EKG: Primary | ICD-10-CM

## 2020-09-09 DIAGNOSIS — R07.2 PRECORDIAL PAIN: ICD-10-CM

## 2020-09-11 ENCOUNTER — TELEPHONE (OUTPATIENT)
Dept: CARDIOLOGY | Facility: CLINIC | Age: 45
End: 2020-09-11

## 2020-09-11 NOTE — TELEPHONE ENCOUNTER
Pt states her HR still in 50-60 and she is dizzy and fatigued even after taking half dose.     bp 109/78 - 125/88      Per Jose M LÓPEZ ok to resume Metoprolol and hctz and d/c bisoprolol until discussed at appt.     Pt verbalized understanding

## 2020-09-11 NOTE — TELEPHONE ENCOUNTER
Since patient was started on bisprolol-hctz she is having problems with heart rate dropping to the 60,s and the other night it dropped down to 55, she states she was directed to only take half of a pill until her body gets used to it so she is not even taking her full dose and she would like a call back asap

## 2020-09-16 ENCOUNTER — HOSPITAL ENCOUNTER (OUTPATIENT)
Dept: CARDIOLOGY | Facility: HOSPITAL | Age: 45
Discharge: HOME | End: 2020-09-16
Admitting: INTERNAL MEDICINE

## 2020-09-16 VITALS
SYSTOLIC BLOOD PRESSURE: 128 MMHG | HEART RATE: 58 BPM | DIASTOLIC BLOOD PRESSURE: 80 MMHG | BODY MASS INDEX: 17 KG/M2 | WEIGHT: 102 LBS | HEIGHT: 65 IN

## 2020-09-16 LAB
AORTIC ARCH: 2.2 CM
BH CV ECHO MEAS - ACS: 1.8 CM
BH CV ECHO MEAS - AO ARCH DIAM (PROXIMAL TRANS.): 2.2 CM
BH CV ECHO MEAS - AO MAX PG (FULL): 2.1 MMHG
BH CV ECHO MEAS - AO MAX PG: 4.5 MMHG
BH CV ECHO MEAS - AO MEAN PG (FULL): 1.4 MMHG
BH CV ECHO MEAS - AO MEAN PG: 2.5 MMHG
BH CV ECHO MEAS - AO ROOT AREA (BSA CORRECTED): 1.8
BH CV ECHO MEAS - AO ROOT AREA: 5 CM^2
BH CV ECHO MEAS - AO ROOT DIAM: 2.5 CM
BH CV ECHO MEAS - AO V2 MAX: 105.7 CM/SEC
BH CV ECHO MEAS - AO V2 MEAN: 76.6 CM/SEC
BH CV ECHO MEAS - AO V2 VTI: 24.3 CM
BH CV ECHO MEAS - AVA(I,A): 2.1 CM^2
BH CV ECHO MEAS - AVA(I,D): 2.1 CM^2
BH CV ECHO MEAS - AVA(V,A): 2.2 CM^2
BH CV ECHO MEAS - AVA(V,D): 2.2 CM^2
BH CV ECHO MEAS - BSA(HAYCOCK): 1.4 M^2
BH CV ECHO MEAS - BSA: 1.4 M^2
BH CV ECHO MEAS - BZI_BMI: 18.5 KILOGRAMS/M^2
BH CV ECHO MEAS - BZI_METRIC_HEIGHT: 157.5 CM
BH CV ECHO MEAS - BZI_METRIC_WEIGHT: 45.8 KG
BH CV ECHO MEAS - EDV(CUBED): 76.4 ML
BH CV ECHO MEAS - EDV(MOD-SP2): 66 ML
BH CV ECHO MEAS - EDV(MOD-SP4): 65 ML
BH CV ECHO MEAS - EDV(TEICH): 80.5 ML
BH CV ECHO MEAS - EF(CUBED): 57.7 %
BH CV ECHO MEAS - EF(MOD-BP): 56.9 %
BH CV ECHO MEAS - EF(MOD-SP2): 54.5 %
BH CV ECHO MEAS - EF(MOD-SP4): 60 %
BH CV ECHO MEAS - EF(TEICH): 49.6 %
BH CV ECHO MEAS - ESV(CUBED): 32.3 ML
BH CV ECHO MEAS - ESV(MOD-SP2): 30 ML
BH CV ECHO MEAS - ESV(MOD-SP4): 26 ML
BH CV ECHO MEAS - ESV(TEICH): 40.5 ML
BH CV ECHO MEAS - FS: 24.9 %
BH CV ECHO MEAS - IVS/LVPW: 0.88
BH CV ECHO MEAS - IVSD: 0.91 CM
BH CV ECHO MEAS - LA DIMENSION: 3.3 CM
BH CV ECHO MEAS - LA/AO: 1.3
BH CV ECHO MEAS - LAT PEAK E' VEL: 16.3 CM/SEC
BH CV ECHO MEAS - LV DIASTOLIC VOL/BSA (35-75): 45.5 ML/M^2
BH CV ECHO MEAS - LV MASS(C)D: 133.8 GRAMS
BH CV ECHO MEAS - LV MASS(C)DI: 93.6 GRAMS/M^2
BH CV ECHO MEAS - LV MAX PG: 2.3 MMHG
BH CV ECHO MEAS - LV MEAN PG: 1.2 MMHG
BH CV ECHO MEAS - LV SYSTOLIC VOL/BSA (12-30): 18.2 ML/M^2
BH CV ECHO MEAS - LV V1 MAX: 76.2 CM/SEC
BH CV ECHO MEAS - LV V1 MEAN: 50.6 CM/SEC
BH CV ECHO MEAS - LV V1 VTI: 16.6 CM
BH CV ECHO MEAS - LVIDD: 4.2 CM
BH CV ECHO MEAS - LVIDS: 3.2 CM
BH CV ECHO MEAS - LVLD AP2: 7 CM
BH CV ECHO MEAS - LVLD AP4: 7 CM
BH CV ECHO MEAS - LVLS AP2: 5.3 CM
BH CV ECHO MEAS - LVLS AP4: 5.3 CM
BH CV ECHO MEAS - LVOT AREA (M): 3.1 CM^2
BH CV ECHO MEAS - LVOT AREA: 3.1 CM^2
BH CV ECHO MEAS - LVOT DIAM: 2 CM
BH CV ECHO MEAS - LVPWD: 1 CM
BH CV ECHO MEAS - MED PEAK E' VEL: 15.6 CM/SEC
BH CV ECHO MEAS - MV A DUR: 0.16 SEC
BH CV ECHO MEAS - MV A MAX VEL: 47.1 CM/SEC
BH CV ECHO MEAS - MV DEC SLOPE: 286.2 CM/SEC^2
BH CV ECHO MEAS - MV DEC TIME: 0.13 SEC
BH CV ECHO MEAS - MV E MAX VEL: 71.3 CM/SEC
BH CV ECHO MEAS - MV E/A: 1.5
BH CV ECHO MEAS - MV MAX PG: 2.2 MMHG
BH CV ECHO MEAS - MV MEAN PG: 0.86 MMHG
BH CV ECHO MEAS - MV P1/2T MAX VEL: 72.1 CM/SEC
BH CV ECHO MEAS - MV P1/2T: 73.8 MSEC
BH CV ECHO MEAS - MV V2 MAX: 74.7 CM/SEC
BH CV ECHO MEAS - MV V2 MEAN: 43.7 CM/SEC
BH CV ECHO MEAS - MV V2 VTI: 23.8 CM
BH CV ECHO MEAS - MVA P1/2T LCG: 3.1 CM^2
BH CV ECHO MEAS - MVA(P1/2T): 3 CM^2
BH CV ECHO MEAS - MVA(VTI): 2.1 CM^2
BH CV ECHO MEAS - PA ACC TIME: 0.16 SEC
BH CV ECHO MEAS - PA MAX PG (FULL): 1.5 MMHG
BH CV ECHO MEAS - PA MAX PG: 2.1 MMHG
BH CV ECHO MEAS - PA PR(ACCEL): 7.7 MMHG
BH CV ECHO MEAS - PA V2 MAX: 73.3 CM/SEC
BH CV ECHO MEAS - PULM A REVS DUR: 0.15 SEC
BH CV ECHO MEAS - PULM A REVS VEL: 24.3 CM/SEC
BH CV ECHO MEAS - PULM DIAS VEL: 68.4 CM/SEC
BH CV ECHO MEAS - PULM S/D: 0.82
BH CV ECHO MEAS - PULM SYS VEL: 55.8 CM/SEC
BH CV ECHO MEAS - PVA(V,A): 1.1 CM^2
BH CV ECHO MEAS - PVA(V,D): 1.1 CM^2
BH CV ECHO MEAS - QP/QS: 0.41
BH CV ECHO MEAS - RAP SYSTOLE: 3 MMHG
BH CV ECHO MEAS - RV MAX PG: 0.66 MMHG
BH CV ECHO MEAS - RV MEAN PG: 0.35 MMHG
BH CV ECHO MEAS - RV V1 MAX: 40.7 CM/SEC
BH CV ECHO MEAS - RV V1 MEAN: 27.3 CM/SEC
BH CV ECHO MEAS - RV V1 VTI: 11 CM
BH CV ECHO MEAS - RVOT AREA: 1.9 CM^2
BH CV ECHO MEAS - RVOT DIAM: 1.6 CM
BH CV ECHO MEAS - RVSP: 19.8 MMHG
BH CV ECHO MEAS - SI(AO): 85.3 ML/M^2
BH CV ECHO MEAS - SI(CUBED): 30.8 ML/M^2
BH CV ECHO MEAS - SI(LVOT): 35.5 ML/M^2
BH CV ECHO MEAS - SI(MOD-SP2): 25.2 ML/M^2
BH CV ECHO MEAS - SI(MOD-SP4): 27.3 ML/M^2
BH CV ECHO MEAS - SI(TEICH): 27.9 ML/M^2
BH CV ECHO MEAS - SV(AO): 121.9 ML
BH CV ECHO MEAS - SV(CUBED): 44 ML
BH CV ECHO MEAS - SV(LVOT): 50.8 ML
BH CV ECHO MEAS - SV(MOD-SP2): 36 ML
BH CV ECHO MEAS - SV(MOD-SP4): 39 ML
BH CV ECHO MEAS - SV(RVOT): 21 ML
BH CV ECHO MEAS - SV(TEICH): 39.9 ML
BH CV ECHO MEAS - TAPSE (>1.6): 1.8 CM
BH CV ECHO MEAS - TR MAX VEL: 205 CM/SEC
BH CV ECHO MEASUREMENTS AVERAGE E/E' RATIO: 4.47
BH CV XLRA - RV BASE: 3.5 CM
BH CV XLRA - RV LENGTH: 6.5 CM
BH CV XLRA - RV MID: 3.1 CM
BH CV XLRA - TDI S': 13.1 CM/SEC
LEFT ATRIUM VOLUME INDEX: 25 ML/M2
MAXIMAL PREDICTED HEART RATE: 174 BPM
SINUS: 2.3 CM
STJ: 2.1 CM
STRESS TARGET HR: 148 BPM

## 2020-09-16 PROCEDURE — 93306 TTE W/DOPPLER COMPLETE: CPT | Performed by: INTERNAL MEDICINE

## 2020-09-16 PROCEDURE — 93306 TTE W/DOPPLER COMPLETE: CPT

## 2020-09-24 ENCOUNTER — HOSPITAL ENCOUNTER (OUTPATIENT)
Dept: CARDIOLOGY | Facility: HOSPITAL | Age: 45
Discharge: HOME | End: 2020-09-24
Admitting: INTERNAL MEDICINE

## 2020-09-24 VITALS — HEART RATE: 71 BPM | DIASTOLIC BLOOD PRESSURE: 84 MMHG | SYSTOLIC BLOOD PRESSURE: 116 MMHG

## 2020-09-24 LAB
BH CV STRESS BP STAGE 1: NORMAL
BH CV STRESS BP STAGE 2: NORMAL
BH CV STRESS BP STAGE 3: NORMAL
BH CV STRESS DURATION MIN STAGE 1: 3
BH CV STRESS DURATION MIN STAGE 2: 3
BH CV STRESS DURATION MIN STAGE 3: 1
BH CV STRESS DURATION SEC STAGE 1: 0
BH CV STRESS DURATION SEC STAGE 2: 0
BH CV STRESS DURATION SEC STAGE 3: 45
BH CV STRESS GRADE STAGE 1: 10
BH CV STRESS GRADE STAGE 2: 12
BH CV STRESS GRADE STAGE 3: 14
BH CV STRESS HR STAGE 1: 102
BH CV STRESS HR STAGE 2: 133
BH CV STRESS HR STAGE 3: 148
BH CV STRESS METS STAGE 1: 4.6
BH CV STRESS METS STAGE 2: 7.1
BH CV STRESS METS STAGE 3: 8.8
BH CV STRESS PROTOCOL 1: NORMAL
BH CV STRESS RECOVERY BP: NORMAL MMHG
BH CV STRESS RECOVERY HR: 90 BPM
BH CV STRESS SPEED STAGE 1: 1.7
BH CV STRESS SPEED STAGE 2: 2.5
BH CV STRESS SPEED STAGE 3: 3.4
BH CV STRESS STAGE 1: 1
BH CV STRESS STAGE 2: 2
BH CV STRESS STAGE 3: 3
Lab: 0
Lab: 0 MS
MAXIMAL PREDICTED HEART RATE: 174 BPM
PERCENT MAX PREDICTED HR: 85.63 %
STRESS BASELINE BP: NORMAL MMHG
STRESS BASELINE HR: 82 BPM
STRESS PERCENT HR: 101 %
STRESS POST ESTIMATED WORKLOAD: 8.9 METS
STRESS POST EXERCISE DUR MIN: 7 MIN
STRESS POST EXERCISE DUR SEC: 45 SEC
STRESS POST PEAK BP: NORMAL MMHG
STRESS POST PEAK HR: 149 BPM
STRESS TARGET HR: 148 BPM

## 2020-09-24 PROCEDURE — 93016 CV STRESS TEST SUPVJ ONLY: CPT | Performed by: INTERNAL MEDICINE

## 2020-09-24 PROCEDURE — 93017 CV STRESS TEST TRACING ONLY: CPT

## 2020-09-24 PROCEDURE — 93018 CV STRESS TEST I&R ONLY: CPT | Performed by: INTERNAL MEDICINE

## 2020-10-02 RX ORDER — METOPROLOL SUCCINATE 25 MG/1
25 TABLET, EXTENDED RELEASE ORAL 2 TIMES DAILY
COMMUNITY
End: 2020-10-26 | Stop reason: SDUPTHER

## 2020-10-02 RX ORDER — HYDROCHLOROTHIAZIDE 12.5 MG/1
12.5 TABLET ORAL 2 TIMES DAILY
COMMUNITY
End: 2020-10-26 | Stop reason: SDUPTHER

## 2020-10-02 NOTE — PROGRESS NOTES
You have chosen to receive care through a telephone visit. Do you consent to use a telephone visit for your medical care today? Yes      TEST RESULTS HOLTER AND ECHO   Subjective:        Kathleen Villanueva is a 44 y.o. female who here for follow up    CC  Telephone checkup  HPI  44 years old female with known history of palpitation shortness of breath recently underwent echocardiogram which shows mild mitral regurgitation, Holter monitor shows PACs, patient had a regular treadmill test which was positive, patient is scheduled for the nuclear stress test     Problem List Items Addressed This Visit        Cardiovascular and Mediastinum    Premature atrial contraction - Primary    Relevant Medications    metoprolol succinate XL (TOPROL-XL) 25 MG 24 hr tablet    Nonrheumatic mitral valve regurgitation    Relevant Medications    metoprolol succinate XL (TOPROL-XL) 25 MG 24 hr tablet        .    The following portions of the patient's history were reviewed and updated as appropriate: allergies, current medications, past family history, past medical history, past social history, past surgical history and problem list.    Past Medical History:   Diagnosis Date   • Abnormal Pap smear of cervix 2007   • Asthma    • DDD (degenerative disc disease), thoracolumbar    • Degenerative disc disease, lumbar    • Depression    • Dysmenorrhea 2008   • Elevated cholesterol    • Endomyometritis 2007   • Genital warts 1999   • Hx of renal calculi    • Irregular heart beat 2007   • Menorrhagia 2008   • Sleep apnea     NO CPAP USE     reports that she has been smoking cigarettes. She has a 30.00 pack-year smoking history. She has never used smokeless tobacco. She reports current alcohol use. She reports that she does not use drugs.   Family History   Problem Relation Age of Onset   • Hypertension Mother    • Heart disease Mother    • Asthma Mother    • Diabetes Mother    • Colon cancer Mother 26   • Melanoma Mother         mole on ankle   •  Brain cancer Mother         unsure of age   • Cervical cancer Mother         unsure of age   • Stroke Father    • Hypertension Father    • Breast cancer Neg Hx    • Ovarian cancer Neg Hx    • Uterine cancer Neg Hx    • Prostate cancer Neg Hx    • Malig Hyperthermia Neg Hx        Review of Systems  Constitutional: No wt loss, fever, fatigue  Gastrointestinal: No nausea, abdominal pain  Behavioral/Psych: No insomnia or anxiety   Cardiovascular no chest pains or tightness in the chest  Objective:       Physical Exam  Telephone checkup only    Procedures      Echocardiogram:        Current Outpatient Medications:   •  atorvastatin (LIPITOR) 20 MG tablet, Take 20 mg by mouth Daily., Disp: , Rfl:   •  cetirizine (zyrTEC) 10 MG tablet, Take 10 mg by mouth Daily., Disp: , Rfl:   •  hydroCHLOROthiazide (HYDRODIURIL) 12.5 MG tablet, Take 12.5 mg by mouth Daily., Disp: , Rfl:   •  metoprolol succinate XL (TOPROL-XL) 25 MG 24 hr tablet, Take 25 mg by mouth Daily., Disp: , Rfl:   •  VENTOLIN  (90 Base) MCG/ACT inhaler, Inhale 2 puffs Every 4 (Four) Hours As Needed., Disp: , Rfl: 4   Assessment:        Patient Active Problem List   Diagnosis   • Well female exam with routine gynecological exam   • Tobacco use   • Dyslipidemia   • Palpitations   • Precordial pain   • Caffeine abuse (CMS/HCC)   • FH ischemic heart disease   • Abnormal EKG     Interpretation Summary    · NSR WITH OCCASIONAL PACS     Interpretation Summary    · Calculated EF = 56.9% Estimated EF was in agreement with the calculated EF.  · Trace to mild mitral valve regurgitation is present.  · Calculated EF = 56.9%  · There is no evidence of pericardial effusion.               Plan:            ICD-10-CM ICD-9-CM   1. Premature atrial contraction  I49.1 427.61   2. Nonrheumatic mitral valve regurgitation  I34.0 424.0     1. Premature atrial contraction  Asymptomatic    2. Nonrheumatic mitral valve regurgitation  Under control     All the results were given pros  and cons and the discussions were done    This patient has consented to a telehealth visit via telephone. The visit was scheduled as a phone visit to comply with patient safety concerns in accordance with CDC recommendations.  All vitals recorded within this visit are reported by the patient.  I spent  12 minutes in total including but not limited to the 12 minutes spent in direct conversation with this patient.     COUNSELING:    Kathleen Swanson was given to patient for the following topics: diagnostic results, risk factor reductions, impressions, risks and benefits of treatment options and importance of treatment compliance .       SMOKING COUNSELING:    [unfilled]    Dictated using Dragon dictation

## 2020-10-05 ENCOUNTER — OFFICE VISIT (OUTPATIENT)
Dept: CARDIOLOGY | Facility: CLINIC | Age: 45
End: 2020-10-05

## 2020-10-05 VITALS
BODY MASS INDEX: 20.16 KG/M2 | HEIGHT: 65 IN | SYSTOLIC BLOOD PRESSURE: 103 MMHG | DIASTOLIC BLOOD PRESSURE: 79 MMHG | HEART RATE: 65 BPM | WEIGHT: 121 LBS

## 2020-10-05 DIAGNOSIS — I34.0 NONRHEUMATIC MITRAL VALVE REGURGITATION: ICD-10-CM

## 2020-10-05 DIAGNOSIS — I49.1 PREMATURE ATRIAL CONTRACTION: Primary | ICD-10-CM

## 2020-10-05 PROBLEM — Z95.0 PRESENCE OF BIVENTRICULAR CARDIAC PACEMAKER: Status: RESOLVED | Noted: 2020-10-05 | Resolved: 2020-10-05

## 2020-10-05 PROBLEM — Z95.0 PRESENCE OF BIVENTRICULAR CARDIAC PACEMAKER: Status: ACTIVE | Noted: 2020-10-05

## 2020-10-05 PROCEDURE — 99442 PR PHYS/QHP TELEPHONE EVALUATION 11-20 MIN: CPT | Performed by: INTERNAL MEDICINE

## 2020-10-14 ENCOUNTER — HOSPITAL ENCOUNTER (OUTPATIENT)
Dept: CARDIOLOGY | Facility: HOSPITAL | Age: 45
Discharge: HOME | End: 2020-10-14

## 2020-10-14 VITALS
RESPIRATION RATE: 16 BRPM | HEART RATE: 54 BPM | BODY MASS INDEX: 20.16 KG/M2 | HEIGHT: 65 IN | WEIGHT: 121 LBS | OXYGEN SATURATION: 98 % | DIASTOLIC BLOOD PRESSURE: 64 MMHG | SYSTOLIC BLOOD PRESSURE: 110 MMHG

## 2020-10-14 PROCEDURE — A9500 TC99M SESTAMIBI: HCPCS | Performed by: INTERNAL MEDICINE

## 2020-10-14 PROCEDURE — 25010000002 REGADENOSON 0.4 MG/5ML SOLUTION: Performed by: INTERNAL MEDICINE

## 2020-10-14 PROCEDURE — 93016 CV STRESS TEST SUPVJ ONLY: CPT | Performed by: NURSE PRACTITIONER

## 2020-10-14 PROCEDURE — 0 TECHNETIUM SESTAMIBI: Performed by: INTERNAL MEDICINE

## 2020-10-14 PROCEDURE — 93017 CV STRESS TEST TRACING ONLY: CPT

## 2020-10-14 PROCEDURE — 93018 CV STRESS TEST I&R ONLY: CPT | Performed by: INTERNAL MEDICINE

## 2020-10-14 PROCEDURE — 78452 HT MUSCLE IMAGE SPECT MULT: CPT

## 2020-10-14 PROCEDURE — 78452 HT MUSCLE IMAGE SPECT MULT: CPT | Performed by: INTERNAL MEDICINE

## 2020-10-14 RX ADMIN — REGADENOSON 0.4 MG: 0.08 INJECTION, SOLUTION INTRAVENOUS at 10:02

## 2020-10-14 RX ADMIN — TECHNETIUM TC 99M SESTAMIBI 1 DOSE: 1 INJECTION INTRAVENOUS at 10:02

## 2020-10-14 RX ADMIN — TECHNETIUM TC 99M SESTAMIBI 1 DOSE: 1 INJECTION INTRAVENOUS at 07:57

## 2020-10-15 LAB
BH CV STRESS BP STAGE 1: NORMAL
BH CV STRESS BP STAGE 2: NORMAL
BH CV STRESS BP STAGE 3: NORMAL
BH CV STRESS DURATION MIN STAGE 1: 3
BH CV STRESS DURATION MIN STAGE 2: 3
BH CV STRESS DURATION MIN STAGE 3: 1
BH CV STRESS DURATION SEC STAGE 1: 0
BH CV STRESS DURATION SEC STAGE 2: 0
BH CV STRESS DURATION SEC STAGE 3: 59
BH CV STRESS GRADE STAGE 1: 10
BH CV STRESS GRADE STAGE 2: 12
BH CV STRESS GRADE STAGE 3: 14
BH CV STRESS HR STAGE 1: 90
BH CV STRESS HR STAGE 2: 112
BH CV STRESS HR STAGE 3: 136
BH CV STRESS METS STAGE 1: 4.6
BH CV STRESS METS STAGE 2: 7
BH CV STRESS METS STAGE 3: 10.1
BH CV STRESS PROTOCOL 1: NORMAL
BH CV STRESS PROTOCOL 2 BP STAGE 1: NORMAL
BH CV STRESS PROTOCOL 2 COMMENTS STAGE 1: NORMAL
BH CV STRESS PROTOCOL 2 DOSE REGADENOSON STAGE 1: 0.4
BH CV STRESS PROTOCOL 2 DURATION MIN STAGE 1: 2
BH CV STRESS PROTOCOL 2 DURATION SEC STAGE 1: 18
BH CV STRESS PROTOCOL 2 HR STAGE 1: 130
BH CV STRESS PROTOCOL 2 STAGE 1: 1
BH CV STRESS PROTOCOL 2: NORMAL
BH CV STRESS RECOVERY BP: NORMAL MMHG
BH CV STRESS RECOVERY HR: 85 BPM
BH CV STRESS RECOVERY O2: 98 %
BH CV STRESS SPEED STAGE 1: 1.7
BH CV STRESS SPEED STAGE 2: 2.5
BH CV STRESS SPEED STAGE 3: 3.4
BH CV STRESS STAGE 1: 1
BH CV STRESS STAGE 2: 2
BH CV STRESS STAGE 3: 3
LV EF NUC BP: 60 %
MAXIMAL PREDICTED HEART RATE: 174 BPM
PERCENT MAX PREDICTED HR: 78.16 %
STRESS BASELINE BP: NORMAL MMHG
STRESS BASELINE HR: 54 BPM
STRESS O2 SAT REST: 98 %
STRESS PERCENT HR: 92 %
STRESS POST ESTIMATED WORKLOAD: 10.1 METS
STRESS POST EXERCISE DUR MIN: 10 MIN
STRESS POST EXERCISE DUR SEC: 21 SEC
STRESS POST PEAK BP: NORMAL MMHG
STRESS POST PEAK HR: 136 BPM
STRESS TARGET HR: 148 BPM

## 2020-10-19 ENCOUNTER — OFFICE VISIT (OUTPATIENT)
Dept: CARDIOLOGY | Facility: CLINIC | Age: 45
End: 2020-10-19

## 2020-10-19 VITALS
SYSTOLIC BLOOD PRESSURE: 128 MMHG | WEIGHT: 117 LBS | BODY MASS INDEX: 19.49 KG/M2 | DIASTOLIC BLOOD PRESSURE: 84 MMHG | HEART RATE: 66 BPM | HEIGHT: 65 IN

## 2020-10-19 DIAGNOSIS — R00.2 PALPITATIONS: Primary | ICD-10-CM

## 2020-10-19 PROCEDURE — 99441 PR PHYS/QHP TELEPHONE EVALUATION 5-10 MIN: CPT | Performed by: INTERNAL MEDICINE

## 2020-10-19 NOTE — PROGRESS NOTES
You have chosen to receive care through a telephone visit. Do you consent to use a telephone visit for your medical care today? Yes    Results    Subjective:        Kathleen Villanueva is a 44 y.o. female who here for follow up    CC  Telephone test results  HPI  44 years old female with the palpitation here on telephone with the test results which are normal     Problems Addressed this Visit        Cardiovascular and Mediastinum    Palpitations - Primary      Diagnoses       Codes Comments    Palpitations    -  Primary ICD-10-CM: R00.2  ICD-9-CM: 785.1         .    The following portions of the patient's history were reviewed and updated as appropriate: allergies, current medications, past family history, past medical history, past social history, past surgical history and problem list.    Past Medical History:   Diagnosis Date   • Abnormal Pap smear of cervix 2007   • Asthma    • DDD (degenerative disc disease), thoracolumbar    • Degenerative disc disease, lumbar    • Depression    • Dysmenorrhea 2008   • Elevated cholesterol    • Endomyometritis 2007   • Genital warts 1999   • Hx of renal calculi    • Hypertension    • Irregular heart beat 2007   • Menorrhagia 2008   • Sleep apnea     NO CPAP USE     reports that she has been smoking cigarettes. She has a 30.00 pack-year smoking history. She has never used smokeless tobacco. She reports current alcohol use. She reports that she does not use drugs.   Family History   Problem Relation Age of Onset   • Hypertension Mother    • Heart disease Mother    • Asthma Mother    • Diabetes Mother    • Colon cancer Mother 26   • Melanoma Mother         mole on ankle   • Brain cancer Mother         unsure of age   • Cervical cancer Mother         unsure of age   • Stroke Father    • Hypertension Father    • Breast cancer Neg Hx    • Ovarian cancer Neg Hx    • Uterine cancer Neg Hx    • Prostate cancer Neg Hx    • Malig Hyperthermia Neg Hx        Review of Systems  Constitutional:  No wt loss, fever, fatigue  Gastrointestinal: No nausea, abdominal pain  Behavioral/Psych: No insomnia or anxiety   Cardiovascular palpitations are better  Objective:       Physical Exam  Telephone conference only    Procedures      Echocardiogram:        Current Outpatient Medications:   •  atorvastatin (LIPITOR) 20 MG tablet, Take 20 mg by mouth Daily., Disp: , Rfl:   •  cetirizine (zyrTEC) 10 MG tablet, Take 10 mg by mouth Daily., Disp: , Rfl:   •  hydroCHLOROthiazide (HYDRODIURIL) 12.5 MG tablet, Take 12.5 mg by mouth 2 (two) times a day., Disp: , Rfl:   •  metoprolol succinate XL (TOPROL-XL) 25 MG 24 hr tablet, Take 25 mg by mouth 2 (two) times a day., Disp: , Rfl:   •  VENTOLIN  (90 Base) MCG/ACT inhaler, Inhale 2 puffs Every 4 (Four) Hours As Needed., Disp: , Rfl: 4   Assessment:        Patient Active Problem List   Diagnosis   • Well female exam with routine gynecological exam   • Tobacco use   • Dyslipidemia   • Palpitations   • Precordial pain   • Caffeine abuse (CMS/HCC)   • FH ischemic heart disease   • Abnormal EKG   • Premature atrial contraction   • Nonrheumatic mitral valve regurgitation     Interpretation Summary       · Findings consistent with an abnormal ECG stress test.  · Left ventricular ejection fraction is normal. (Calculated EF = 60%).  · Myocardial perfusion imaging indicates a normal myocardial perfusion study with no evidence of ischemia.  · Impressions are consistent with a low risk study.     Interpretation Summary    · Calculated EF = 56.9% Estimated EF was in agreement with the calculated EF.  · Trace to mild mitral valve regurgitation is present.  · Calculated EF = 56.9%  · There is no evidence of pericardial effusion.         Interpretation Summary    · NSR WITH OCCASIONAL PACS           Plan:            ICD-10-CM ICD-9-CM   1. Palpitations  R00.2 785.1     1. Palpitations  Work-up is negative       1 yr     Specificity and sensitivity of the stress test/ cardiac workup has  been explained. Pt has been explained if  Symptoms continue please go to ER, and further w/p will be required.    Also explained this does not rule out coronary artery disease or the future events, continue to emphasize on risk reductions for coronary artery disease    Pt also advised to contact PCP for other causes of symptoms    This patient has consented to a telehealth visit via telephone. The visit was scheduled as a telephone visit to comply with patient safety concerns in accordance with CDC recommendations.  All vitals recorded within this visit are reported by the patient.  I spent  8 minutes in total including but not limited to the 8 minutes spent in direct conversation with this patient.     COUNSELING:    Kathleen Swanson was given to patient for the following topics: diagnostic results, risk factor reductions, impressions, risks and benefits of treatment options and importance of treatment compliance .       SMOKING COUNSELING:    [unfilled]    Dictated using Dragon dictation

## 2020-10-26 RX ORDER — ATORVASTATIN CALCIUM 20 MG/1
20 TABLET, FILM COATED ORAL DAILY
Qty: 30 TABLET | Refills: 3 | Status: SHIPPED | OUTPATIENT
Start: 2020-10-26 | End: 2021-06-07

## 2020-10-26 RX ORDER — METOPROLOL SUCCINATE 25 MG/1
25 TABLET, EXTENDED RELEASE ORAL 2 TIMES DAILY
Qty: 60 TABLET | Refills: 3 | Status: SHIPPED | OUTPATIENT
Start: 2020-10-26 | End: 2020-10-27 | Stop reason: DRUGHIGH

## 2020-10-26 RX ORDER — HYDROCHLOROTHIAZIDE 12.5 MG/1
12.5 TABLET ORAL 2 TIMES DAILY
Qty: 60 TABLET | Refills: 3 | Status: SHIPPED | OUTPATIENT
Start: 2020-10-26 | End: 2021-06-07

## 2020-10-28 RX ORDER — METOPROLOL SUCCINATE 50 MG/1
TABLET, EXTENDED RELEASE ORAL
Qty: 30 TABLET | Refills: 11 | Status: SHIPPED | OUTPATIENT
Start: 2020-10-28 | End: 2021-09-16

## 2020-11-02 ENCOUNTER — HOSPITAL ENCOUNTER (OUTPATIENT)
Dept: MAMMOGRAPHY | Facility: HOSPITAL | Age: 45
Discharge: HOME | End: 2020-11-02
Admitting: OBSTETRICS & GYNECOLOGY

## 2020-11-02 DIAGNOSIS — Z12.31 SCREENING MAMMOGRAM, ENCOUNTER FOR: ICD-10-CM

## 2020-11-02 PROCEDURE — 77067 SCR MAMMO BI INCL CAD: CPT

## 2021-05-24 ENCOUNTER — TELEPHONE (OUTPATIENT)
Dept: CARDIOLOGY | Facility: CLINIC | Age: 46
End: 2021-05-24

## 2021-05-24 NOTE — TELEPHONE ENCOUNTER
PT IS HAVING REALLY HIGH BP THAT CAUSES HER TO HAVE HEADACHES  AND WANTS TO KNOW IF SHE CAN BE SEEN 138/81 HR 70 THE OTHER DAY.   THIS MORNING /82 HR 74  PLEASE CALL PT AS SHE DEVELOPS HEADACHE /80 HR 86 SITTING ON SIDE OF THE BED.      PLEASE CALL HER [phone] TODAY IF AT ALL POSSIBLE

## 2021-05-25 NOTE — TELEPHONE ENCOUNTER
CALLED PT.   PT IS A , WAS DRINKING GATORADE.  BP /91.  STATES SHE CAN'T GET THE BOTTOM NUMBER TO COME DOWN. SHE DEVELOPS A HEADACHE WHEN IT IS 80 AND ABOVE DIASTOLICALLY.    127/96  82  CURRENT READING    CURRENTLY TAKING HCTZ 12.5MG BID  METOPROLOL 25 MG BID    I HAVE ASKED PT TO CHECK BP PRIOR TO GOING TO WORK.

## 2021-05-26 NOTE — TELEPHONE ENCOUNTER
PER DR. PICKARD, PT TO INCREASE METOPROLOL TO 50 MG IN THE AM AND CONTINUE 25 MG IN THE PM.  CONTINUE TO MONITOR BP, KEEP A LOG AND CALL THE OFFICE IN 1 WEEK WITH READINGS. SOONER IF NEEDED.  S/W PT. SHE IS AWARE AND VERBALIZED UNDERSTANDING.

## 2021-05-28 NOTE — TELEPHONE ENCOUNTER
PT STATES THAT POTASSIUM WAS LOW, QUESTIONED IF THE CHANGE IN METOPROLOL WOULD AFFECT.  PER DR. PICKARD, METOPROLOL DOES NOT AFFECT POTASSIUM    PT IS AWARE AND VERBALIZED UNDERSTANDING.

## 2021-06-07 RX ORDER — ATORVASTATIN CALCIUM 20 MG/1
20 TABLET, FILM COATED ORAL DAILY
Qty: 30 TABLET | Refills: 3 | Status: SHIPPED | OUTPATIENT
Start: 2021-06-07 | End: 2022-01-24

## 2021-06-07 RX ORDER — HYDROCHLOROTHIAZIDE 12.5 MG/1
12.5 TABLET ORAL 2 TIMES DAILY
Qty: 60 TABLET | Refills: 3 | Status: SHIPPED | OUTPATIENT
Start: 2021-06-07 | End: 2022-01-24

## 2021-06-08 PROCEDURE — 93005 ELECTROCARDIOGRAM TRACING: CPT

## 2021-06-08 PROCEDURE — 93005 ELECTROCARDIOGRAM TRACING: CPT | Performed by: EMERGENCY MEDICINE

## 2021-06-08 PROCEDURE — 93010 ELECTROCARDIOGRAM REPORT: CPT | Performed by: INTERNAL MEDICINE

## 2021-06-08 PROCEDURE — 99283 EMERGENCY DEPT VISIT LOW MDM: CPT

## 2021-06-08 RX ORDER — SODIUM CHLORIDE 0.9 % (FLUSH) 0.9 %
10 SYRINGE (ML) INJECTION AS NEEDED
Status: DISCONTINUED | OUTPATIENT
Start: 2021-06-08 | End: 2021-06-09 | Stop reason: HOSPADM

## 2021-06-08 RX ORDER — ASPIRIN 325 MG
325 TABLET ORAL ONCE
Status: COMPLETED | OUTPATIENT
Start: 2021-06-08 | End: 2021-06-09

## 2021-06-09 ENCOUNTER — APPOINTMENT (OUTPATIENT)
Dept: GENERAL RADIOLOGY | Facility: HOSPITAL | Age: 46
End: 2021-06-09

## 2021-06-09 ENCOUNTER — HOSPITAL ENCOUNTER (EMERGENCY)
Facility: HOSPITAL | Age: 46
Discharge: HOME | End: 2021-06-09
Attending: EMERGENCY MEDICINE

## 2021-06-09 VITALS
SYSTOLIC BLOOD PRESSURE: 121 MMHG | OXYGEN SATURATION: 98 % | HEART RATE: 58 BPM | DIASTOLIC BLOOD PRESSURE: 79 MMHG | TEMPERATURE: 97.5 F | RESPIRATION RATE: 16 BRPM

## 2021-06-09 DIAGNOSIS — R07.9 RIGHT-SIDED CHEST PAIN: Primary | ICD-10-CM

## 2021-06-09 LAB
ALBUMIN SERPL-MCNC: 4.9 G/DL (ref 3.5–5.2)
ALBUMIN/GLOB SERPL: 1.9 G/DL
ALP SERPL-CCNC: 74 U/L (ref 39–117)
ALT SERPL W P-5'-P-CCNC: 19 U/L (ref 1–33)
ANION GAP SERPL CALCULATED.3IONS-SCNC: 8.4 MMOL/L (ref 5–15)
AST SERPL-CCNC: 20 U/L (ref 1–32)
BASOPHILS # BLD AUTO: 0.12 10*3/MM3 (ref 0–0.2)
BASOPHILS NFR BLD AUTO: 0.9 % (ref 0–1.5)
BILIRUB SERPL-MCNC: 0.4 MG/DL (ref 0–1.2)
BUN SERPL-MCNC: 12 MG/DL (ref 6–20)
BUN/CREAT SERPL: 14.1 (ref 7–25)
CALCIUM SPEC-SCNC: 9.9 MG/DL (ref 8.6–10.5)
CHLORIDE SERPL-SCNC: 100 MMOL/L (ref 98–107)
CO2 SERPL-SCNC: 32.6 MMOL/L (ref 22–29)
CREAT SERPL-MCNC: 0.85 MG/DL (ref 0.57–1)
D DIMER PPP FEU-MCNC: 0.29 MCGFEU/ML (ref 0–0.49)
DEPRECATED RDW RBC AUTO: 42.1 FL (ref 37–54)
EOSINOPHIL # BLD AUTO: 0.34 10*3/MM3 (ref 0–0.4)
EOSINOPHIL NFR BLD AUTO: 2.6 % (ref 0.3–6.2)
ERYTHROCYTE [DISTWIDTH] IN BLOOD BY AUTOMATED COUNT: 13 % (ref 12.3–15.4)
GFR SERPL CREATININE-BSD FRML MDRD: 72 ML/MIN/1.73
GLOBULIN UR ELPH-MCNC: 2.6 GM/DL
GLUCOSE SERPL-MCNC: 81 MG/DL (ref 65–99)
HCT VFR BLD AUTO: 45.1 % (ref 34–46.6)
HGB BLD-MCNC: 15.8 G/DL (ref 12–15.9)
HOLD SPECIMEN: NORMAL
HOLD SPECIMEN: NORMAL
IMM GRANULOCYTES # BLD AUTO: 0.08 10*3/MM3 (ref 0–0.05)
IMM GRANULOCYTES NFR BLD AUTO: 0.6 % (ref 0–0.5)
LYMPHOCYTES # BLD AUTO: 4.39 10*3/MM3 (ref 0.7–3.1)
LYMPHOCYTES NFR BLD AUTO: 33.5 % (ref 19.6–45.3)
MCH RBC QN AUTO: 31.5 PG (ref 26.6–33)
MCHC RBC AUTO-ENTMCNC: 35 G/DL (ref 31.5–35.7)
MCV RBC AUTO: 89.8 FL (ref 79–97)
MONOCYTES # BLD AUTO: 0.96 10*3/MM3 (ref 0.1–0.9)
MONOCYTES NFR BLD AUTO: 7.3 % (ref 5–12)
NEUTROPHILS NFR BLD AUTO: 55.1 % (ref 42.7–76)
NEUTROPHILS NFR BLD AUTO: 7.21 10*3/MM3 (ref 1.7–7)
NRBC BLD AUTO-RTO: 0 /100 WBC (ref 0–0.2)
PLATELET # BLD AUTO: 313 10*3/MM3 (ref 140–450)
PMV BLD AUTO: 10.6 FL (ref 6–12)
POTASSIUM SERPL-SCNC: 3.6 MMOL/L (ref 3.5–5.2)
PROT SERPL-MCNC: 7.5 G/DL (ref 6–8.5)
QT INTERVAL: 380 MS
RBC # BLD AUTO: 5.02 10*6/MM3 (ref 3.77–5.28)
SODIUM SERPL-SCNC: 141 MMOL/L (ref 136–145)
TROPONIN T SERPL-MCNC: <0.01 NG/ML (ref 0–0.03)
TROPONIN T SERPL-MCNC: <0.01 NG/ML (ref 0–0.03)
WBC # BLD AUTO: 13.1 10*3/MM3 (ref 3.4–10.8)
WHOLE BLOOD HOLD SPECIMEN: NORMAL
WHOLE BLOOD HOLD SPECIMEN: NORMAL

## 2021-06-09 PROCEDURE — 96375 TX/PRO/DX INJ NEW DRUG ADDON: CPT

## 2021-06-09 PROCEDURE — 96374 THER/PROPH/DIAG INJ IV PUSH: CPT

## 2021-06-09 PROCEDURE — 84484 ASSAY OF TROPONIN QUANT: CPT | Performed by: EMERGENCY MEDICINE

## 2021-06-09 PROCEDURE — 25010000002 ONDANSETRON PER 1 MG: Performed by: PHYSICIAN ASSISTANT

## 2021-06-09 PROCEDURE — 80053 COMPREHEN METABOLIC PANEL: CPT | Performed by: EMERGENCY MEDICINE

## 2021-06-09 PROCEDURE — 85025 COMPLETE CBC W/AUTO DIFF WBC: CPT | Performed by: EMERGENCY MEDICINE

## 2021-06-09 PROCEDURE — 25010000002 KETOROLAC TROMETHAMINE PER 15 MG: Performed by: PHYSICIAN ASSISTANT

## 2021-06-09 PROCEDURE — 71046 X-RAY EXAM CHEST 2 VIEWS: CPT

## 2021-06-09 PROCEDURE — 85379 FIBRIN DEGRADATION QUANT: CPT | Performed by: PHYSICIAN ASSISTANT

## 2021-06-09 RX ORDER — KETOROLAC TROMETHAMINE 15 MG/ML
15 INJECTION, SOLUTION INTRAMUSCULAR; INTRAVENOUS ONCE
Status: COMPLETED | OUTPATIENT
Start: 2021-06-09 | End: 2021-06-09

## 2021-06-09 RX ORDER — ONDANSETRON 2 MG/ML
4 INJECTION INTRAMUSCULAR; INTRAVENOUS ONCE
Status: COMPLETED | OUTPATIENT
Start: 2021-06-09 | End: 2021-06-09

## 2021-06-09 RX ORDER — NAPROXEN 500 MG/1
500 TABLET ORAL 2 TIMES DAILY WITH MEALS
Qty: 20 TABLET | Refills: 0 | Status: SHIPPED | OUTPATIENT
Start: 2021-06-09 | End: 2021-12-22

## 2021-06-09 RX ADMIN — ASPIRIN 325 MG: 325 TABLET ORAL at 01:26

## 2021-06-09 RX ADMIN — ONDANSETRON 4 MG: 2 INJECTION INTRAMUSCULAR; INTRAVENOUS at 01:26

## 2021-06-09 RX ADMIN — KETOROLAC TROMETHAMINE 15 MG: 15 INJECTION, SOLUTION INTRAMUSCULAR; INTRAVENOUS at 01:26

## 2021-06-09 RX ADMIN — SODIUM CHLORIDE 1000 ML: 9 INJECTION, SOLUTION INTRAVENOUS at 01:23

## 2021-06-09 NOTE — ED PROVIDER NOTES
EMERGENCY DEPARTMENT ENCOUNTER    Room Number:  07/07  Date of encounter:  6/9/2021  PCP: Liv Castro APRN  Historian: Patient      HPI:  Chief Complaint: chest pain       Context: Kathleen Villanueva is a 45 y.o. female with past medical history of HTN, HLD, AF who presents to the ED c/o right-sided chest pain.  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  Patient states that she felt like she also had a racing heart rate.  Denies nausea or vomiting, cough, fever, or shortness of breath.  Patient states symptoms were worse with movement, lifting, and a deep breath.      PAST MEDICAL HISTORY  Active Ambulatory Problems     Diagnosis Date Noted   • Well female exam with routine gynecological exam 09/13/2017   • Tobacco use 09/13/2017   • Dyslipidemia 08/12/2019   • Palpitations 08/26/2020   • Precordial pain 08/26/2020   • Caffeine abuse (CMS/[facility]) 08/26/2020   • FH ischemic heart disease 08/26/2020   • Abnormal EKG 08/26/2020   • Premature atrial contraction 10/05/2020   • Nonrheumatic mitral valve regurgitation 10/05/2020     Resolved Ambulatory Problems     Diagnosis Date Noted   • Depo-Provera contraceptive status 09/13/2017   • Abnormal uterine bleeding (AUB) 05/23/2018   • Presence of biventricular cardiac pacemaker 10/05/2020     Past Medical History:   Diagnosis Date   • Abnormal Pap smear of cervix 2007   • Asthma    • DDD (degenerative disc disease), thoracolumbar    • Degenerative disc disease, lumbar    • Depression    • Dysmenorrhea 2008   • Elevated cholesterol    • Endomyometritis 2007   • Genital warts 1999   • Hx of renal calculi    • Hypertension    • Irregular heart beat 2007   • Menorrhagia 2008   • Sleep apnea          PAST SURGICAL HISTORY  Past Surgical History:   Procedure Laterality Date    • BREAST BIOPSY  2014    Left Breast Benign    • TOTAL LAPAROSCOPIC HYSTERECTOMY SALPINGO OOPHORECTOMY Bilateral 6/14/2018    Procedure: TOTAL LAPAROSCOPIC HYSTERECTOMY BILATERAL SALPINGECTOMY  CYSTOSCOPY;  Surgeon: Henri Painter MD;  Location: [facility] MAIN OR;  Service: Obstetrics/Gynecology   • WISDOM TOOTH EXTRACTION  2007         FAMILY HISTORY  Family History   Problem Relation Age of Onset   • Hypertension Mother    • Heart disease Mother    • Asthma Mother    • Diabetes Mother    • Colon cancer Mother 26   • Melanoma Mother         mole on ankle   • Brain cancer Mother         unsure of age   • Cervical cancer Mother         unsure of age   • Stroke Father    • Hypertension Father    • Breast cancer Neg Hx    • Ovarian cancer Neg Hx    • Uterine cancer Neg Hx    • Prostate cancer Neg Hx    • Malig Hyperthermia Neg Hx          SOCIAL HISTORY  Social History     Socioeconomic History   • Marital status: Single     Spouse name: Not on file   • Number of children: 3   • Years of education: ged   • Highest education level: Not on file   Tobacco Use   • Smoking status: Current Every Day Smoker     Packs/day: 1.00     Years: 30.00     Pack years: 30.00     Types: Cigarettes   • Smokeless tobacco: Never Used   Substance and Sexual Activity   • Alcohol use: Yes     Comment: rare   • Drug use: No   • Sexual activity: Yes     Partners: Male     Birth control/protection: Surgical     Comment: Hysterectomy 2018         ALLERGIES  Ultram [tramadol]        REVIEW OF SYSTEMS  Review of Systems     All systems reviewed and negative except for those discussed in HPI.       PHYSICAL EXAM    I have reviewed the triage vital signs and nursing notes.    ED Triage Vitals [06/08/21 2334]   Temp Heart Rate Resp BP SpO2   97.5 °F (36.4 °C) 91 16 -- 99 %      Temp src Heart Rate Source Patient Position BP Location FiO2 (%)   -- -- -- -- --       Physical Exam  GENERAL: Alert and oriented x3, not distressed  HENT: Mask in  place  EYES: no scleral icterus, PERRL, EOMI  CV: regular rhythm, regular rate  RESPIRATORY: normal effort, clear to auscultate bilaterally  ABDOMEN: soft  MUSCULOSKELETAL: no deformity, no pedal edema or calf tenderness  NEURO: alert, moves all extremities, follows commands  SKIN: warm, dry        LAB RESULTS  Recent Results (from the past 24 hour(s))   ECG 12 Lead    Collection Time: 06/08/21 11:38 PM   Result Value Ref Range    QT Interval 380 ms   Comprehensive Metabolic Panel    Collection Time: 06/09/21  1:20 AM    Specimen: Blood   Result Value Ref Range    Glucose 81 65 - 99 mg/dL    BUN 12 6 - 20 mg/dL    Creatinine 0.85 0.57 - 1.00 mg/dL    Sodium 141 136 - 145 mmol/L    Potassium 3.6 3.5 - 5.2 mmol/L    Chloride 100 98 - 107 mmol/L    CO2 32.6 (H) 22.0 - 29.0 mmol/L    Calcium 9.9 8.6 - 10.5 mg/dL    Total Protein 7.5 6.0 - 8.5 g/dL    Albumin 4.90 3.50 - 5.20 g/dL    ALT (SGPT) 19 1 - 33 U/L    AST (SGOT) 20 1 - 32 U/L    Alkaline Phosphatase 74 39 - 117 U/L    Total Bilirubin 0.4 0.0 - 1.2 mg/dL    eGFR Non African Amer 72 >60 mL/min/1.73    Globulin 2.6 gm/dL    A/G Ratio 1.9 g/dL    BUN/Creatinine Ratio 14.1 7.0 - 25.0    Anion Gap 8.4 5.0 - 15.0 mmol/L   Troponin    Collection Time: 06/09/21  1:20 AM    Specimen: Blood   Result Value Ref Range    Troponin T <0.010 0.000 - 0.030 ng/mL   Light Blue Top    Collection Time: 06/09/21  1:20 AM   Result Value Ref Range    Extra Tube hold for add-on    Green Top (Gel)    Collection Time: 06/09/21  1:20 AM   Result Value Ref Range    Extra Tube Hold for add-ons.    Lavender Top    Collection Time: 06/09/21  1:20 AM   Result Value Ref Range    Extra Tube hold for add-on    Gold Top - SST    Collection Time: 06/09/21  1:20 AM   Result Value Ref Range    Extra Tube Hold for add-ons.    CBC Auto Differential    Collection Time: 06/09/21  1:20 AM    Specimen: Blood   Result Value Ref Range    WBC 13.10 (H) 3.40 - 10.80 10*3/mm3    RBC 5.02 3.77 - 5.28 10*6/mm3     Hemoglobin 15.8 12.0 - 15.9 g/dL    Hematocrit 45.1 34.0 - 46.6 %    MCV 89.8 79.0 - 97.0 fL    MCH 31.5 26.6 - 33.0 pg    MCHC 35.0 31.5 - 35.7 g/dL    RDW 13.0 12.3 - 15.4 %    RDW-SD 42.1 37.0 - 54.0 fl    MPV 10.6 6.0 - 12.0 fL    Platelets 313 140 - 450 10*3/mm3    Neutrophil % 55.1 42.7 - 76.0 %    Lymphocyte % 33.5 19.6 - 45.3 %    Monocyte % 7.3 5.0 - 12.0 %    Eosinophil % 2.6 0.3 - 6.2 %    Basophil % 0.9 0.0 - 1.5 %    Immature Grans % 0.6 (H) 0.0 - 0.5 %    Neutrophils, Absolute 7.21 (H) 1.70 - 7.00 10*3/mm3    Lymphocytes, Absolute 4.39 (H) 0.70 - 3.10 10*3/mm3    Monocytes, Absolute 0.96 (H) 0.10 - 0.90 10*3/mm3    Eosinophils, Absolute 0.34 0.00 - 0.40 10*3/mm3    Basophils, Absolute 0.12 0.00 - 0.20 10*3/mm3    Immature Grans, Absolute 0.08 (H) 0.00 - 0.05 10*3/mm3    nRBC 0.0 0.0 - 0.2 /100 WBC   D-dimer, Quantitative    Collection Time: 06/09/21  1:20 AM    Specimen: Blood   Result Value Ref Range    D-Dimer, Quantitative 0.29 0.00 - 0.49 MCGFEU/mL   Troponin    Collection Time: 06/09/21  3:42 AM    Specimen: Blood   Result Value Ref Range    Troponin T <0.010 0.000 - 0.030 ng/mL       Ordered the above labs and independently reviewed the results.        RADIOLOGY  XR Chest 2 View    Result Date: 6/9/2021  Patient: MARCIE SEPULVEDA  Time Out: 00:21 Exam(s): FILM CXR 2 VIEWS EXAM:   XR Chest, 2 Views CLINICAL HISTORY:    Reason for exam: Chest Pain Triage Protocol. TECHNIQUE:   Frontal and lateral views of the chest. COMPARISON:   8 23 20 FINDINGS:   No significant changes or acute radiographic findings.  Stable anterior upper lobe calcified granuloma.    Electronically signed by Blair Johnson DO on 06-09-21 at 0021      I ordered the above noted radiological studies. Reviewed by me and discussed with radiologist.  See dictation for official radiology interpretation.      PROCEDURES    Procedures      MEDICATIONS GIVEN IN ER    Medications   sodium chloride 0.9 % flush 10 mL (has no administration  in time range)   aspirin tablet 325 mg (325 mg Oral Given 6/9/21 0126)   sodium chloride 0.9 % bolus 1,000 mL (0 mL Intravenous Stopped 6/9/21 0340)   ketorolac (TORADOL) injection 15 mg (15 mg Intravenous Given 6/9/21 0126)   ondansetron (ZOFRAN) injection 4 mg (4 mg Intravenous Given 6/9/21 0126)         PROGRESS, DATA ANALYSIS, CONSULTS, AND MEDICAL DECISION MAKING    All labs have been independently reviewed by me.  All radiology studies have been reviewed by me and discussed with radiologist dictating the report.   EKG's independently viewed and interpreted by me.  Discussion below represents my analysis of pertinent findings related to patient's condition, differential diagnosis, treatment plan and final disposition.    DDx: Includes but not limited to chest pain, chest wall pain, pneumothorax, pleurisy, PE, NSTEMI, STEMI    ED Course as of Jun 09 0453   Wed Jun 09, 2021   0103 EKG          EKG time: 23:38  Rhythm/Rate: Sinus rhythm at 78 bpm  P waves and NH: Normal  QRS, axis: Normal  ST and T waves: No acute ST/T wave changes    Interpreted Contemporaneously by me, independently viewed  Not significantly changed compared to prior EKG of 8/22/2020.      [LEO]   0423 Patient rechecked, resting comfortably in bed, no acute distress.  Discussed results, diagnosis, treatment plan, and reasons to return to care.  Patient expressed understanding and agrees with plan.    [LEO]      ED Course User Index  [LEO] Marshall Gar PA       MDM: EKG is normal and troponin is negative x2, patient's D-dimer was negative therefore the risk of acute coronary syndrome and PE are extremely low.  Patient does not have pneumonia, pneumothorax, or rib injury on her chest x-ray.  We will treat patient with NSAIDs for chest wall pain and for possible pleurisy.  Patient's vital signs are stable, patient has a low risk heart score, and is stable for discharge.    PPE: Both the patient and I wore a surgical mask throughout the entire  patient encounter. I wore protective goggles.    AS OF 04:53 EDT VITALS:    BP - 121/79  HR - 58  TEMP - 97.5 °F (36.4 °C)  O2 SATS - 98%        DIAGNOSIS  Final diagnoses:   Right-sided chest pain         DISPOSITION  DISCHARGE    Patient discharged in stable condition.    Reviewed implications of results, diagnosis, meds, responsibility to follow up, warning signs and symptoms of possible worsening, potential complications and reasons to return to ER.    Patient/Family voiced understanding of above instructions.    Discussed plan for discharge, as there is no emergent indication for admission. Patient referred to primary care provider for BP management due to today's BP. Pt/family is agreeable and understands need for follow up and repeat testing.  Pt is aware that discharge does not mean that nothing is wrong but it indicates no emergency is present that requires admission and they must continue care with follow-up as given below or physician of their choice.     FOLLOW-UP  Liv Castro APRN  [address] [id number] [phone]    Schedule an appointment as soon as possible for a visit in 1 week      Martin Lara MD  [facility] [address]  [phone]    Schedule an appointment as soon as possible for a visit            Medication List      New Prescriptions    naproxen 500 MG tablet  Commonly known as: Naprosyn  Take 1 tablet by mouth 2 (Two) Times a Day With Meals.           Where to Get Your Medications      These medications were sent to [facility] - [place], [place] - [address] [place] [address] Rd. - [phone] PH - [phone] FX  [address] [place] [address] [address] [facility] [address]    Phone: [phone]   · naproxen 500 MG tablet            Marshall Gar PA  06/09/21 8622

## 2021-06-09 NOTE — ED NOTES
"Pt reports chest pain that started at 9pm tonight. Pain radiates to right shoulder, between shoulder blades and right side neck. History of Afib, HTN and \"leaky valve\".      Sushma Mckeon, RN  06/08/21 3192    "

## 2021-06-09 NOTE — DISCHARGE INSTRUCTIONS
Home, rest, medicine as directed, home medicine as prescribed, follow up with PCP and cardiology for recheck. Return to care if symptoms worsen or with further concerns.

## 2021-06-09 NOTE — ED PROVIDER NOTES
Pt presents to the ED c/o  right-sided chest pain onset around 9 PM last night while at work.  She reports that she also experienced some pain in the middle of her chest and her right shoulder.  She reports that she was sweating but denies nausea, vomiting, shortness of air.  She does have a history of paroxysmal A. fib and also mitral regurgitation.  Her pain has now resolved.     On exam,   Awake and alert, no acute distress.  Lungs clear to auscultation bilaterally.  Regular rhythm, normal rate.     Plan: Initial EKG and troponin are reassuring.  Dimer is negative.  Wells low risk for pulmonary embolus.  Plan to obtain repeat cardiac troponin and ultimately discharged home with outpatient cardiology follow-up.      I wore a mask, face shield, and gloves during this patient encounter.  Patient also wearing a surgical mask.  Hand hygeine performed before and after seeing the patient.     Attestation:  The ANGELINA and I have discussed this patient's history, physical exam, and treatment plan.  I have reviewed the documentation and personally had a face to face interaction with the patient. I affirm the documentation and agree with the treatment and plan.  The attached note describes my personal findings.            Will Montiel MD  06/09/21 0238

## 2021-06-10 ENCOUNTER — OFFICE VISIT (OUTPATIENT)
Dept: CARDIOLOGY | Facility: CLINIC | Age: 46
End: 2021-06-10

## 2021-06-10 VITALS
HEART RATE: 77 BPM | DIASTOLIC BLOOD PRESSURE: 85 MMHG | WEIGHT: 127 LBS | HEIGHT: 65 IN | BODY MASS INDEX: 21.16 KG/M2 | SYSTOLIC BLOOD PRESSURE: 128 MMHG

## 2021-06-10 DIAGNOSIS — R07.2 PRECORDIAL PAIN: Primary | ICD-10-CM

## 2021-06-10 DIAGNOSIS — R00.2 PALPITATIONS: ICD-10-CM

## 2021-06-10 DIAGNOSIS — I34.0 NONRHEUMATIC MITRAL VALVE REGURGITATION: ICD-10-CM

## 2021-06-10 PROCEDURE — 99213 OFFICE O/P EST LOW 20 MIN: CPT | Performed by: INTERNAL MEDICINE

## 2021-06-10 NOTE — PROGRESS NOTES
ER FOLLOW UP, CP   Subjective:        Kathleen Villanueva is a 45 y.o. female who here for follow up    CC  ER WITH RT SHOULDER AND RT UPPER CHEST AND RETROSTERNAL RADIATING TO BACK  HPI  45-year-old female with atypical chest pain palpitations as well as mitral regurgitation has been complaining of the right shoulder in the right upper chest pain radiating to back mild to moderate in intensity     Problems Addressed this Visit        Cardiac and Vasculature    Palpitations    Precordial pain - Primary    Relevant Orders    Treadmill Stress Test    Adult Transthoracic Echo Complete W/ Cont if Necessary Per Protocol    Nonrheumatic mitral valve regurgitation    Relevant Orders    Treadmill Stress Test    Adult Transthoracic Echo Complete W/ Cont if Necessary Per Protocol      Diagnoses       Codes Comments    Precordial pain    -  Primary ICD-10-CM: R07.2  ICD-9-CM: 786.51     Nonrheumatic mitral valve regurgitation     ICD-10-CM: I34.0  ICD-9-CM: 424.0     Palpitations     ICD-10-CM: R00.2  ICD-9-CM: 785.1         .    The following portions of the patient's history were reviewed and updated as appropriate: allergies, current medications, past family history, past medical history, past social history, past surgical history and problem list.    Past Medical History:   Diagnosis Date   • Abnormal Pap smear of cervix 2007   • Asthma    • DDD (degenerative disc disease), thoracolumbar    • Degenerative disc disease, lumbar    • Depression    • Dysmenorrhea 2008   • Elevated cholesterol    • Endomyometritis 2007   • Genital warts 1999   • Hx of renal calculi    • Hypertension    • Irregular heart beat 2007   • Menorrhagia 2008   • Sleep apnea     NO CPAP USE     reports that she has been smoking cigarettes. She has a 7.50 pack-year smoking history. She has never used smokeless tobacco. She reports current alcohol use. She reports that she does not use drugs.   Family History   Problem Relation Age of Onset   • Hypertension  "Mother    • Heart disease Mother    • Asthma Mother    • Diabetes Mother    • Colon cancer Mother 26   • Melanoma Mother         mole on ankle   • Brain cancer Mother         unsure of age   • Cervical cancer Mother         unsure of age   • Stroke Father    • Hypertension Father    • Breast cancer Neg Hx    • Ovarian cancer Neg Hx    • Uterine cancer Neg Hx    • Prostate cancer Neg Hx    • Malig Hyperthermia Neg Hx        Review of Systems  Constitutional: No wt loss, fever, fatigue  Gastrointestinal: No nausea, abdominal pain  Behavioral/Psych: No insomnia or anxiety   Cardiovascular right-sided chest pain  Objective:       Physical Exam  /85   Pulse 77   Ht 165.1 cm (65\")   Wt 57.6 kg (127 lb)   LMP 06/12/2018 (LMP Unknown)   BMI 21.13 kg/m²   General appearance: No acute changes   Neck: Trachea midline; NECK, supple, no thyromegaly or lymphadenopathy   Lungs: Normal size and shape, normal breath sounds, equal distribution of air, no rales and rhonchi   CV: S1-S2 regular, no murmurs, no rub, no gallop   Abdomen: Soft, non-tender; no masses , no abnormal abdominal sounds   Extremities: No deformity , normal color , no peripheral edema   Skin: Normal temperature, turgor and texture; no rash, ulcers          Procedures      Echocardiogram:        Current Outpatient Medications:   •  atorvastatin (LIPITOR) 20 MG tablet, TAKE 1 TABLET BY MOUTH DAILY., Disp: 30 tablet, Rfl: 3  •  cetirizine (zyrTEC) 10 MG tablet, Take 10 mg by mouth Daily., Disp: , Rfl:   •  hydroCHLOROthiazide (HYDRODIURIL) 12.5 MG tablet, TAKE 1 TABLET BY MOUTH 2 (TWO) TIMES A DAY., Disp: 60 tablet, Rfl: 3  •  metoprolol succinate XL (TOPROL-XL) 50 MG 24 hr tablet, Take 1/2 tab bid, Disp: 30 tablet, Rfl: 11  •  naproxen (Naprosyn) 500 MG tablet, Take 1 tablet by mouth 2 (Two) Times a Day With Meals., Disp: 20 tablet, Rfl: 0  •  VENTOLIN  (90 Base) MCG/ACT inhaler, Inhale 2 puffs Every 4 (Four) Hours As Needed., Disp: , Rfl: 4   " Assessment:        Patient Active Problem List   Diagnosis   • Well female exam with routine gynecological exam   • Tobacco use   • Dyslipidemia   • Palpitations   • Precordial pain   • Caffeine abuse (CMS/HCC)   • FH ischemic heart disease   • Abnormal EKG   • Premature atrial contraction   • Nonrheumatic mitral valve regurgitation               Plan:            ICD-10-CM ICD-9-CM   1. Precordial pain  R07.2 786.51   2. Nonrheumatic mitral valve regurgitation  I34.0 424.0   3. Palpitations  R00.2 785.1     1. Precordial pain  Considering the patient's symptoms as well as clinical situation and  EKG findings, along with cardiac risk factors, ischemic workup is necessary to rule out ischemic cardiomyopathy, stress induced arrhythmias, and functional capacity for diagnosis as well as prognostic consideration    - Treadmill Stress Test  - Adult Transthoracic Echo Complete W/ Cont if Necessary Per Protocol    2. Nonrheumatic mitral valve regurgitation  Considering patient's medical condition as well as the risk factors, patient will require echocardiogram for further evaluation for the LV function, four-chamber evaluation, including the pressures, valvular function and  pericardial disease and pericardial effusion    - Treadmill Stress Test  - Adult Transthoracic Echo Complete W/ Cont if Necessary Per Protocol    3. Palpitations  Under control     ETT  ECHO  F/U  COUNSELING:    Kathleen Swanson was given to patient for the following topics: diagnostic results, risk factor reductions, impressions, risks and benefits of treatment options and importance of treatment compliance .       SMOKING COUNSELING:    [unfilled]    Dictated using Dragon dictation

## 2021-07-08 ENCOUNTER — HOSPITAL ENCOUNTER (OUTPATIENT)
Dept: CARDIOLOGY | Facility: HOSPITAL | Age: 46
Discharge: HOME | End: 2021-07-08

## 2021-07-08 VITALS
HEART RATE: 67 BPM | BODY MASS INDEX: 21.16 KG/M2 | SYSTOLIC BLOOD PRESSURE: 106 MMHG | HEIGHT: 65 IN | WEIGHT: 127 LBS | DIASTOLIC BLOOD PRESSURE: 60 MMHG

## 2021-07-08 VITALS — DIASTOLIC BLOOD PRESSURE: 60 MMHG | HEART RATE: 67 BPM | SYSTOLIC BLOOD PRESSURE: 106 MMHG

## 2021-07-08 LAB
AORTIC DIMENSIONLESS INDEX: 0.9 (DI)
BH CV ECHO MEAS - ACS: 1.8 CM
BH CV ECHO MEAS - AO MAX PG (FULL): 1.2 MMHG
BH CV ECHO MEAS - AO MAX PG: 4.6 MMHG
BH CV ECHO MEAS - AO MEAN PG (FULL): 0.33 MMHG
BH CV ECHO MEAS - AO MEAN PG: 2.2 MMHG
BH CV ECHO MEAS - AO ROOT AREA (BSA CORRECTED): 1.4
BH CV ECHO MEAS - AO ROOT AREA: 4.3 CM^2
BH CV ECHO MEAS - AO ROOT DIAM: 2.3 CM
BH CV ECHO MEAS - AO V2 MAX: 107.4 CM/SEC
BH CV ECHO MEAS - AO V2 MEAN: 67.6 CM/SEC
BH CV ECHO MEAS - AO V2 VTI: 18.4 CM
BH CV ECHO MEAS - AVA(I,A): 2.8 CM^2
BH CV ECHO MEAS - AVA(I,D): 2.8 CM^2
BH CV ECHO MEAS - AVA(V,A): 2.6 CM^2
BH CV ECHO MEAS - AVA(V,D): 2.6 CM^2
BH CV ECHO MEAS - BSA(HAYCOCK): 1.6 M^2
BH CV ECHO MEAS - BSA: 1.6 M^2
BH CV ECHO MEAS - BZI_BMI: 21.1 KILOGRAMS/M^2
BH CV ECHO MEAS - BZI_METRIC_HEIGHT: 165.1 CM
BH CV ECHO MEAS - BZI_METRIC_WEIGHT: 57.6 KG
BH CV ECHO MEAS - EDV(CUBED): 86.3 ML
BH CV ECHO MEAS - EDV(MOD-SP2): 75 ML
BH CV ECHO MEAS - EDV(MOD-SP4): 70 ML
BH CV ECHO MEAS - EDV(TEICH): 88.6 ML
BH CV ECHO MEAS - EF(CUBED): 62.1 %
BH CV ECHO MEAS - EF(MOD-BP): 54.5 %
BH CV ECHO MEAS - EF(MOD-SP2): 60 %
BH CV ECHO MEAS - EF(MOD-SP4): 51.4 %
BH CV ECHO MEAS - EF(TEICH): 53.8 %
BH CV ECHO MEAS - ESV(CUBED): 32.7 ML
BH CV ECHO MEAS - ESV(MOD-SP2): 30 ML
BH CV ECHO MEAS - ESV(MOD-SP4): 34 ML
BH CV ECHO MEAS - ESV(TEICH): 40.9 ML
BH CV ECHO MEAS - FS: 27.6 %
BH CV ECHO MEAS - IVS/LVPW: 0.99
BH CV ECHO MEAS - IVSD: 0.63 CM
BH CV ECHO MEAS - LAT PEAK E' VEL: 12.8 CM/SEC
BH CV ECHO MEAS - LV DIASTOLIC VOL/BSA (35-75): 42.9 ML/M^2
BH CV ECHO MEAS - LV MASS(C)D: 81.5 GRAMS
BH CV ECHO MEAS - LV MASS(C)DI: 50 GRAMS/M^2
BH CV ECHO MEAS - LV MAX PG: 3.4 MMHG
BH CV ECHO MEAS - LV MEAN PG: 1.9 MMHG
BH CV ECHO MEAS - LV SYSTOLIC VOL/BSA (12-30): 20.8 ML/M^2
BH CV ECHO MEAS - LV V1 MAX: 92.6 CM/SEC
BH CV ECHO MEAS - LV V1 MEAN: 64.4 CM/SEC
BH CV ECHO MEAS - LV V1 VTI: 17 CM
BH CV ECHO MEAS - LVIDD: 4.4 CM
BH CV ECHO MEAS - LVIDS: 3.2 CM
BH CV ECHO MEAS - LVLD AP2: 7.4 CM
BH CV ECHO MEAS - LVLD AP4: 7.5 CM
BH CV ECHO MEAS - LVLS AP2: 5.9 CM
BH CV ECHO MEAS - LVLS AP4: 6.3 CM
BH CV ECHO MEAS - LVOT AREA (M): 3.1 CM^2
BH CV ECHO MEAS - LVOT AREA: 3 CM^2
BH CV ECHO MEAS - LVOT DIAM: 2 CM
BH CV ECHO MEAS - LVPWD: 0.63 CM
BH CV ECHO MEAS - MED PEAK E' VEL: 10.1 CM/SEC
BH CV ECHO MEAS - MV A DUR: 0.1 SEC
BH CV ECHO MEAS - MV A MAX VEL: 57.7 CM/SEC
BH CV ECHO MEAS - MV DEC SLOPE: 349.4 CM/SEC^2
BH CV ECHO MEAS - MV DEC TIME: 169 SEC
BH CV ECHO MEAS - MV E MAX VEL: 69.8 CM/SEC
BH CV ECHO MEAS - MV E/A: 1.2
BH CV ECHO MEAS - MV MAX PG: 2.2 MMHG
BH CV ECHO MEAS - MV MEAN PG: 0.97 MMHG
BH CV ECHO MEAS - MV P1/2T MAX VEL: 86.9 CM/SEC
BH CV ECHO MEAS - MV P1/2T: 72.8 MSEC
BH CV ECHO MEAS - MV V2 MAX: 74.8 CM/SEC
BH CV ECHO MEAS - MV V2 MEAN: 46.2 CM/SEC
BH CV ECHO MEAS - MV V2 VTI: 20.6 CM
BH CV ECHO MEAS - MVA P1/2T LCG: 2.5 CM^2
BH CV ECHO MEAS - MVA(P1/2T): 3 CM^2
BH CV ECHO MEAS - MVA(VTI): 2.5 CM^2
BH CV ECHO MEAS - PA ACC TIME: 0.1 SEC
BH CV ECHO MEAS - PA MAX PG (FULL): 1.8 MMHG
BH CV ECHO MEAS - PA MAX PG: 2.8 MMHG
BH CV ECHO MEAS - PA PR(ACCEL): 36.2 MMHG
BH CV ECHO MEAS - PA V2 MAX: 84.4 CM/SEC
BH CV ECHO MEAS - PULM A REVS DUR: 0.12 SEC
BH CV ECHO MEAS - PULM A REVS VEL: 34 CM/SEC
BH CV ECHO MEAS - PULM DIAS VEL: 51.9 CM/SEC
BH CV ECHO MEAS - PULM S/D: 1.4
BH CV ECHO MEAS - PULM SYS VEL: 70.3 CM/SEC
BH CV ECHO MEAS - RAP SYSTOLE: 3 MMHG
BH CV ECHO MEAS - RV MAX PG: 1 MMHG
BH CV ECHO MEAS - RV MEAN PG: 0.54 MMHG
BH CV ECHO MEAS - RV V1 MAX: 50.9 CM/SEC
BH CV ECHO MEAS - RV V1 MEAN: 33.9 CM/SEC
BH CV ECHO MEAS - RV V1 VTI: 10.1 CM
BH CV ECHO MEAS - RVSP: 20 MMHG
BH CV ECHO MEAS - SI(AO): 48 ML/M^2
BH CV ECHO MEAS - SI(CUBED): 32.9 ML/M^2
BH CV ECHO MEAS - SI(LVOT): 31.8 ML/M^2
BH CV ECHO MEAS - SI(MOD-SP2): 27.6 ML/M^2
BH CV ECHO MEAS - SI(MOD-SP4): 22.1 ML/M^2
BH CV ECHO MEAS - SI(TEICH): 29.2 ML/M^2
BH CV ECHO MEAS - SV(AO): 78.3 ML
BH CV ECHO MEAS - SV(CUBED): 53.6 ML
BH CV ECHO MEAS - SV(LVOT): 51.8 ML
BH CV ECHO MEAS - SV(MOD-SP2): 45 ML
BH CV ECHO MEAS - SV(MOD-SP4): 36 ML
BH CV ECHO MEAS - SV(TEICH): 47.7 ML
BH CV ECHO MEAS - TAPSE (>1.6): 2.3 CM
BH CV ECHO MEAS - TR MAX PG: 17 MMHG
BH CV ECHO MEAS - TR MAX VEL: 203.6 CM/SEC
BH CV ECHO MEASUREMENTS AVERAGE E/E' RATIO: 6.1
BH CV STRESS BP STAGE 1: NORMAL
BH CV STRESS BP STAGE 2: NORMAL
BH CV STRESS BP STAGE 3: NORMAL
BH CV STRESS DURATION MIN STAGE 1: 3
BH CV STRESS DURATION MIN STAGE 2: 3
BH CV STRESS DURATION MIN STAGE 3: 3
BH CV STRESS DURATION SEC STAGE 1: 0
BH CV STRESS DURATION SEC STAGE 2: 0
BH CV STRESS DURATION SEC STAGE 3: 0
BH CV STRESS GRADE STAGE 1: 10
BH CV STRESS GRADE STAGE 2: 12
BH CV STRESS GRADE STAGE 3: 14
BH CV STRESS HR STAGE 1: 106
BH CV STRESS HR STAGE 2: 134
BH CV STRESS HR STAGE 3: 146
BH CV STRESS METS STAGE 1: 4.6
BH CV STRESS METS STAGE 2: 7.1
BH CV STRESS METS STAGE 3: 10.2
BH CV STRESS PROTOCOL 1: NORMAL
BH CV STRESS RECOVERY BP: NORMAL MMHG
BH CV STRESS RECOVERY HR: 84 BPM
BH CV STRESS SPEED STAGE 1: 1.7
BH CV STRESS SPEED STAGE 2: 2.5
BH CV STRESS SPEED STAGE 3: 3.4
BH CV STRESS STAGE 1: 1
BH CV STRESS STAGE 2: 2
BH CV STRESS STAGE 3: 3
BH CV XLRA - RV BASE: 2.4 CM
BH CV XLRA - RV LENGTH: 5.5 CM
BH CV XLRA - RV MID: 2.1 CM
BH CV XLRA - TDI S': 12.2 CM/SEC
LEFT ATRIUM VOLUME INDEX: 17.3 ML/M2
MAXIMAL PREDICTED HEART RATE: 173 BPM
MAXIMAL PREDICTED HEART RATE: 173 BPM
PERCENT MAX PREDICTED HR: 87.28 %
STRESS BASELINE BP: NORMAL MMHG
STRESS BASELINE HR: 67 BPM
STRESS PERCENT HR: 103 %
STRESS POST ESTIMATED WORKLOAD: 10.3 METS
STRESS POST EXERCISE DUR MIN: 9 MIN
STRESS POST EXERCISE DUR SEC: 0 SEC
STRESS POST PEAK BP: NORMAL MMHG
STRESS POST PEAK HR: 151 BPM
STRESS TARGET HR: 147 BPM
STRESS TARGET HR: 147 BPM

## 2021-07-08 PROCEDURE — 93306 TTE W/DOPPLER COMPLETE: CPT | Performed by: INTERNAL MEDICINE

## 2021-07-08 PROCEDURE — 93018 CV STRESS TEST I&R ONLY: CPT | Performed by: INTERNAL MEDICINE

## 2021-07-08 PROCEDURE — 93306 TTE W/DOPPLER COMPLETE: CPT

## 2021-07-08 PROCEDURE — 93017 CV STRESS TEST TRACING ONLY: CPT

## 2021-07-29 ENCOUNTER — LAB (OUTPATIENT)
Dept: LAB | Facility: HOSPITAL | Age: 46
End: 2021-07-29

## 2021-07-29 DIAGNOSIS — I34.0 NONRHEUMATIC MITRAL VALVE REGURGITATION: ICD-10-CM

## 2021-07-29 DIAGNOSIS — R00.2 PALPITATIONS: ICD-10-CM

## 2021-07-29 DIAGNOSIS — R07.2 PRECORDIAL PAIN: ICD-10-CM

## 2021-07-29 LAB
ANION GAP SERPL CALCULATED.3IONS-SCNC: 12.8 MMOL/L (ref 5–15)
APTT PPP: 32.5 SECONDS (ref 22.7–35.4)
BASOPHILS # BLD AUTO: 0.13 10*3/MM3 (ref 0–0.2)
BASOPHILS NFR BLD AUTO: 1 % (ref 0–1.5)
BUN SERPL-MCNC: 10 MG/DL (ref 6–20)
BUN/CREAT SERPL: 12.7 (ref 7–25)
CALCIUM SPEC-SCNC: 9.5 MG/DL (ref 8.6–10.5)
CHLORIDE SERPL-SCNC: 96 MMOL/L (ref 98–107)
CO2 SERPL-SCNC: 29.2 MMOL/L (ref 22–29)
CREAT SERPL-MCNC: 0.79 MG/DL (ref 0.57–1)
DEPRECATED RDW RBC AUTO: 41.3 FL (ref 37–54)
EOSINOPHIL # BLD AUTO: 0.32 10*3/MM3 (ref 0–0.4)
EOSINOPHIL NFR BLD AUTO: 2.5 % (ref 0.3–6.2)
ERYTHROCYTE [DISTWIDTH] IN BLOOD BY AUTOMATED COUNT: 12.8 % (ref 12.3–15.4)
GFR SERPL CREATININE-BSD FRML MDRD: 78 ML/MIN/1.73
GLUCOSE SERPL-MCNC: 126 MG/DL (ref 65–99)
HCT VFR BLD AUTO: 42.3 % (ref 34–46.6)
HGB BLD-MCNC: 14.4 G/DL (ref 12–15.9)
IMM GRANULOCYTES # BLD AUTO: 0.15 10*3/MM3 (ref 0–0.05)
IMM GRANULOCYTES NFR BLD AUTO: 1.2 % (ref 0–0.5)
INR PPP: 0.95 (ref 0.9–1.1)
LYMPHOCYTES # BLD AUTO: 4.19 10*3/MM3 (ref 0.7–3.1)
LYMPHOCYTES NFR BLD AUTO: 32.2 % (ref 19.6–45.3)
MCH RBC QN AUTO: 31 PG (ref 26.6–33)
MCHC RBC AUTO-ENTMCNC: 34 G/DL (ref 31.5–35.7)
MCV RBC AUTO: 91.2 FL (ref 79–97)
MONOCYTES # BLD AUTO: 1.11 10*3/MM3 (ref 0.1–0.9)
MONOCYTES NFR BLD AUTO: 8.5 % (ref 5–12)
NEUTROPHILS NFR BLD AUTO: 54.6 % (ref 42.7–76)
NEUTROPHILS NFR BLD AUTO: 7.1 10*3/MM3 (ref 1.7–7)
NRBC BLD AUTO-RTO: 0 /100 WBC (ref 0–0.2)
PLATELET # BLD AUTO: 296 10*3/MM3 (ref 140–450)
PMV BLD AUTO: 11.1 FL (ref 6–12)
POTASSIUM SERPL-SCNC: 3.2 MMOL/L (ref 3.5–5.2)
PROTHROMBIN TIME: 12.5 SECONDS (ref 11.7–14.2)
RBC # BLD AUTO: 4.64 10*6/MM3 (ref 3.77–5.28)
SODIUM SERPL-SCNC: 138 MMOL/L (ref 136–145)
WBC # BLD AUTO: 13 10*3/MM3 (ref 3.4–10.8)

## 2021-07-29 PROCEDURE — 80048 BASIC METABOLIC PNL TOTAL CA: CPT | Performed by: INTERNAL MEDICINE

## 2021-07-29 PROCEDURE — 85730 THROMBOPLASTIN TIME PARTIAL: CPT

## 2021-07-29 PROCEDURE — 85610 PROTHROMBIN TIME: CPT

## 2021-07-29 PROCEDURE — 85025 COMPLETE CBC W/AUTO DIFF WBC: CPT

## 2021-07-30 ENCOUNTER — HOSPITAL ENCOUNTER (OUTPATIENT)
Facility: HOSPITAL | Age: 46
Setting detail: HOSPITAL OUTPATIENT SURGERY
Discharge: HOME | End: 2021-07-30
Attending: INTERNAL MEDICINE | Admitting: INTERNAL MEDICINE

## 2021-07-30 VITALS
BODY MASS INDEX: 20.49 KG/M2 | WEIGHT: 123 LBS | RESPIRATION RATE: 18 BRPM | HEIGHT: 65 IN | SYSTOLIC BLOOD PRESSURE: 119 MMHG | OXYGEN SATURATION: 98 % | DIASTOLIC BLOOD PRESSURE: 84 MMHG | HEART RATE: 58 BPM | TEMPERATURE: 97.5 F

## 2021-07-30 DIAGNOSIS — R00.2 PALPITATIONS: ICD-10-CM

## 2021-07-30 DIAGNOSIS — I34.0 NONRHEUMATIC MITRAL VALVE REGURGITATION: ICD-10-CM

## 2021-07-30 DIAGNOSIS — R07.2 PRECORDIAL PAIN: ICD-10-CM

## 2021-07-30 PROCEDURE — C1894 INTRO/SHEATH, NON-LASER: HCPCS | Performed by: INTERNAL MEDICINE

## 2021-07-30 PROCEDURE — 93458 L HRT ARTERY/VENTRICLE ANGIO: CPT | Performed by: INTERNAL MEDICINE

## 2021-07-30 PROCEDURE — C1769 GUIDE WIRE: HCPCS | Performed by: INTERNAL MEDICINE

## 2021-07-30 PROCEDURE — 0 IOPAMIDOL PER 1 ML: Performed by: INTERNAL MEDICINE

## 2021-07-30 PROCEDURE — 25010000002 HEPARIN (PORCINE) PER 1000 UNITS: Performed by: INTERNAL MEDICINE

## 2021-07-30 PROCEDURE — 25010000002 MIDAZOLAM PER 1 MG: Performed by: INTERNAL MEDICINE

## 2021-07-30 PROCEDURE — 63710000001 DIPHENHYDRAMINE PER 50 MG: Performed by: INTERNAL MEDICINE

## 2021-07-30 PROCEDURE — 25010000002 FENTANYL CITRATE (PF) 50 MCG/ML SOLUTION: Performed by: INTERNAL MEDICINE

## 2021-07-30 RX ORDER — LIDOCAINE HYDROCHLORIDE 20 MG/ML
INJECTION, SOLUTION INFILTRATION; PERINEURAL AS NEEDED
Status: DISCONTINUED | OUTPATIENT
Start: 2021-07-30 | End: 2021-07-30 | Stop reason: HOSPADM

## 2021-07-30 RX ORDER — SODIUM CHLORIDE 0.9 % (FLUSH) 0.9 %
3 SYRINGE (ML) INJECTION EVERY 12 HOURS SCHEDULED
Status: DISCONTINUED | OUTPATIENT
Start: 2021-07-30 | End: 2021-07-30 | Stop reason: HOSPADM

## 2021-07-30 RX ORDER — DIPHENHYDRAMINE HCL 25 MG
25 CAPSULE ORAL ONCE
Status: COMPLETED | OUTPATIENT
Start: 2021-07-30 | End: 2021-07-30

## 2021-07-30 RX ORDER — MIDAZOLAM HYDROCHLORIDE 1 MG/ML
INJECTION INTRAMUSCULAR; INTRAVENOUS AS NEEDED
Status: DISCONTINUED | OUTPATIENT
Start: 2021-07-30 | End: 2021-07-30 | Stop reason: HOSPADM

## 2021-07-30 RX ORDER — METOPROLOL SUCCINATE 50 MG/1
25 TABLET, EXTENDED RELEASE ORAL ONCE
Status: COMPLETED | OUTPATIENT
Start: 2021-07-30 | End: 2021-07-30

## 2021-07-30 RX ORDER — SODIUM CHLORIDE 9 MG/ML
75 INJECTION, SOLUTION INTRAVENOUS CONTINUOUS
Status: DISCONTINUED | OUTPATIENT
Start: 2021-07-30 | End: 2021-07-30 | Stop reason: HOSPADM

## 2021-07-30 RX ORDER — ACETAMINOPHEN 325 MG/1
650 TABLET ORAL EVERY 4 HOURS PRN
Status: DISCONTINUED | OUTPATIENT
Start: 2021-07-30 | End: 2021-07-30 | Stop reason: HOSPADM

## 2021-07-30 RX ORDER — FENTANYL CITRATE 50 UG/ML
INJECTION, SOLUTION INTRAMUSCULAR; INTRAVENOUS AS NEEDED
Status: DISCONTINUED | OUTPATIENT
Start: 2021-07-30 | End: 2021-07-30 | Stop reason: HOSPADM

## 2021-07-30 RX ORDER — SODIUM CHLORIDE 0.9 % (FLUSH) 0.9 %
10 SYRINGE (ML) INJECTION AS NEEDED
Status: DISCONTINUED | OUTPATIENT
Start: 2021-07-30 | End: 2021-07-30 | Stop reason: HOSPADM

## 2021-07-30 RX ADMIN — DIPHENHYDRAMINE HYDROCHLORIDE 25 MG: 25 CAPSULE ORAL at 15:08

## 2021-07-30 RX ADMIN — METOPROLOL SUCCINATE 25 MG: 50 TABLET, EXTENDED RELEASE ORAL at 15:37

## 2021-07-30 RX ADMIN — SODIUM CHLORIDE 75 ML/HR: 9 INJECTION, SOLUTION INTRAVENOUS at 10:20

## 2021-08-19 ENCOUNTER — OFFICE VISIT (OUTPATIENT)
Dept: CARDIOLOGY | Facility: CLINIC | Age: 46
End: 2021-08-19

## 2021-08-19 VITALS
WEIGHT: 122 LBS | HEART RATE: 76 BPM | DIASTOLIC BLOOD PRESSURE: 78 MMHG | SYSTOLIC BLOOD PRESSURE: 112 MMHG | BODY MASS INDEX: 20.33 KG/M2 | HEIGHT: 65 IN

## 2021-08-19 DIAGNOSIS — E78.5 DYSLIPIDEMIA: ICD-10-CM

## 2021-08-19 DIAGNOSIS — R07.2 PRECORDIAL PAIN: Primary | ICD-10-CM

## 2021-08-19 DIAGNOSIS — R00.2 PALPITATIONS: ICD-10-CM

## 2021-08-19 PROCEDURE — 99213 OFFICE O/P EST LOW 20 MIN: CPT | Performed by: INTERNAL MEDICINE

## 2021-08-19 RX ORDER — BUDESONIDE AND FORMOTEROL FUMARATE DIHYDRATE 160; 4.5 UG/1; UG/1
AEROSOL RESPIRATORY (INHALATION)
COMMUNITY
Start: 2021-06-06 | End: 2021-12-22

## 2021-08-19 NOTE — PROGRESS NOTES
Cath follow up    Subjective:        Kathleen Villanueva is a 45 y.o. female who here for follow up    CC  POST CATH  HPI  45-year-old female with dyslipidemia, palpitations, atypical chest pains here for the follow-up after the recent heart catheterization with no complications     Problems Addressed this Visit        Cardiac and Vasculature    Dyslipidemia    Palpitations    Precordial pain - Primary      Diagnoses       Codes Comments    Precordial pain    -  Primary ICD-10-CM: R07.2  ICD-9-CM: 786.51     Palpitations     ICD-10-CM: R00.2  ICD-9-CM: 785.1     Dyslipidemia     ICD-10-CM: E78.5  ICD-9-CM: 272.4         .    The following portions of the patient's history were reviewed and updated as appropriate: allergies, current medications, past family history, past medical history, past social history, past surgical history and problem list.    Past Medical History:   Diagnosis Date   • Abnormal Pap smear of cervix 2007   • Asthma    • DDD (degenerative disc disease), thoracolumbar    • Degenerative disc disease, lumbar    • Depression    • Dysmenorrhea 2008   • Elevated cholesterol    • Endomyometritis 2007   • Genital warts 1999   • Hx of renal calculi    • Hypertension    • Irregular heart beat 2007   • Menorrhagia 2008   • Sleep apnea     NO CPAP USE     reports that she has been smoking cigarettes. She has a 30.00 pack-year smoking history. She has never used smokeless tobacco. She reports current alcohol use. She reports that she does not use drugs.   Family History   Problem Relation Age of Onset   • Hypertension Mother    • Heart disease Mother    • Asthma Mother    • Diabetes Mother    • Colon cancer Mother 26   • Melanoma Mother         mole on ankle   • Brain cancer Mother         unsure of age   • Cervical cancer Mother         unsure of age   • Stroke Father    • Hypertension Father    • Breast cancer Neg Hx    • Ovarian cancer Neg Hx    • Uterine cancer Neg Hx    • Prostate cancer Neg Hx    • Robert  "Hyperthermia Neg Hx        Review of Systems  Constitutional: No wt loss, fever, fatigue  Gastrointestinal: No nausea, abdominal pain  Behavioral/Psych: No insomnia or anxiety   Cardiovascular no chest pains or tightness in the chest  Objective:       Physical Exam  /78   Pulse 76   Ht 165.1 cm (65\")   Wt 55.3 kg (122 lb)   LMP 06/12/2018 (LMP Unknown)   BMI 20.30 kg/m²   General appearance: No acute changes   Neck: Trachea midline; NECK, supple, no thyromegaly or lymphadenopathy   Lungs: Normal size and shape, normal breath sounds, equal distribution of air, no rales and rhonchi   CV: S1-S2 regular, no murmurs, no rub, no gallop   Abdomen: Soft, non-tender; no masses , no abnormal abdominal sounds   Extremities: No deformity , normal color , no peripheral edema   Skin: Normal temperature, turgor and texture; no rash, ulcers          Procedures      Echocardiogram:        Current Outpatient Medications:   •  atorvastatin (LIPITOR) 20 MG tablet, TAKE 1 TABLET BY MOUTH DAILY., Disp: 30 tablet, Rfl: 3  •  cetirizine (zyrTEC) 10 MG tablet, Take 10 mg by mouth Daily., Disp: , Rfl:   •  hydroCHLOROthiazide (HYDRODIURIL) 12.5 MG tablet, TAKE 1 TABLET BY MOUTH 2 (TWO) TIMES A DAY., Disp: 60 tablet, Rfl: 3  •  metoprolol succinate XL (TOPROL-XL) 50 MG 24 hr tablet, Take 1/2 tab bid, Disp: 30 tablet, Rfl: 11  •  naproxen (Naprosyn) 500 MG tablet, Take 1 tablet by mouth 2 (Two) Times a Day With Meals. (Patient taking differently: Take 500 mg by mouth As Needed.), Disp: 20 tablet, Rfl: 0  •  Symbicort 160-4.5 MCG/ACT inhaler, , Disp: , Rfl:   •  VENTOLIN  (90 Base) MCG/ACT inhaler, Inhale 2 puffs Every 4 (Four) Hours As Needed., Disp: , Rfl: 4   Assessment:        Patient Active Problem List   Diagnosis   • Well female exam with routine gynecological exam   • Tobacco use   • Dyslipidemia   • Palpitations   • Precordial pain   • Caffeine abuse (CMS/HCC)   • FH ischemic heart disease   • Abnormal EKG   • " Premature atrial contraction   • Nonrheumatic mitral valve regurgitation               Plan:            ICD-10-CM ICD-9-CM   1. Precordial pain  R07.2 786.51   2. Palpitations  R00.2 785.1   3. Dyslipidemia  E78.5 272.4     1. Precordial pain  Atypical    2. Palpitations  Under control    3. Dyslipidemia  Continue current treatment       Kathleen Villanueva here for the follow-up post heart catheter, being treated medically.Kathleen Villanueva has no complications.  Access site has been examined shows no complications.  Patient has been advised to contact us with any further issues and questions related to the heart catheter      1 YR  COUNSELING:    Kathleen Swanson was given to patient for the following topics: diagnostic results, risk factor reductions, impressions, risks and benefits of treatment options and importance of treatment compliance .       SMOKING COUNSELING:    [unfilled]    Dictated using Dragon dictation

## 2021-09-02 ENCOUNTER — TRANSCRIBE ORDERS (OUTPATIENT)
Dept: ADMINISTRATIVE | Facility: HOSPITAL | Age: 46
End: 2021-09-02

## 2021-09-02 DIAGNOSIS — Z12.31 VISIT FOR SCREENING MAMMOGRAM: Primary | ICD-10-CM

## 2021-09-16 RX ORDER — METOPROLOL SUCCINATE 50 MG/1
TABLET, EXTENDED RELEASE ORAL
Qty: 30 TABLET | Refills: 5 | Status: SHIPPED | OUTPATIENT
Start: 2021-09-16 | End: 2022-05-18

## 2021-12-22 ENCOUNTER — OFFICE VISIT (OUTPATIENT)
Dept: OBSTETRICS AND GYNECOLOGY | Age: 46
End: 2021-12-22

## 2021-12-22 VITALS
WEIGHT: 128 LBS | HEIGHT: 65 IN | DIASTOLIC BLOOD PRESSURE: 78 MMHG | BODY MASS INDEX: 21.33 KG/M2 | SYSTOLIC BLOOD PRESSURE: 132 MMHG

## 2021-12-22 DIAGNOSIS — Z13.89 SCREENING FOR BLOOD OR PROTEIN IN URINE: ICD-10-CM

## 2021-12-22 DIAGNOSIS — Z12.31 SCREENING MAMMOGRAM, ENCOUNTER FOR: ICD-10-CM

## 2021-12-22 DIAGNOSIS — F17.200 SMOKER UNMOTIVATED TO QUIT: ICD-10-CM

## 2021-12-22 DIAGNOSIS — R68.82 LOW LIBIDO: ICD-10-CM

## 2021-12-22 DIAGNOSIS — Z01.419 WELL FEMALE EXAM WITH ROUTINE GYNECOLOGICAL EXAM: Primary | ICD-10-CM

## 2021-12-22 PROBLEM — Z72.0 TOBACCO USE: Status: RESOLVED | Noted: 2017-09-13 | Resolved: 2021-12-22

## 2021-12-22 LAB
BILIRUB BLD-MCNC: NEGATIVE MG/DL
CLARITY, POC: CLEAR
COLOR UR: YELLOW
GLUCOSE UR STRIP-MCNC: NEGATIVE MG/DL
KETONES UR QL: NEGATIVE
LEUKOCYTE EST, POC: ABNORMAL
NITRITE UR-MCNC: NEGATIVE MG/ML
PH UR: 5 [PH] (ref 5–8)
PROT UR STRIP-MCNC: NEGATIVE MG/DL
RBC # UR STRIP: ABNORMAL /UL
SP GR UR: 1 (ref 1–1.03)
UROBILINOGEN UR QL: NORMAL

## 2021-12-22 PROCEDURE — 99213 OFFICE O/P EST LOW 20 MIN: CPT | Performed by: OBSTETRICS & GYNECOLOGY

## 2021-12-22 PROCEDURE — 99396 PREV VISIT EST AGE 40-64: CPT | Performed by: OBSTETRICS & GYNECOLOGY

## 2021-12-22 NOTE — PROGRESS NOTES
[facility]   Obstetrics and Gynecology       2021    Patient: Kathleen Villanueva          MR#:[id number]    History of Present Illness    Chief Complaint   Patient presents with   • Gynecologic Exam     last pap 2020 neg, last mg 2020   • Nicotine Dependence     pt desires to quit smoking. She states the patches were working well with step 1 and step 2 but after step 2 there was no next step and she felt she needed to smoke again. She reports trying vapes but does not like the flavor       45 y.o. female  who presents for annual exam.    The patient presents for her regular exam reporting continual attempts to stop smoking.  She is requesting a refill on topical testosterone for low libido.  She said the therapy was quite effective in improving her symptoms.     We had a lengthy discussion regarding strategies for smoking cessation including aids like nicotine patches and other options.    We discussed that most research suggested cold turkey seems to be most effective      Studies reviewed:    Patient's last menstrual period was 2018 (lmp unknown).  Obstetric History:  OB History        3    Para   3    Term   3            AB        Living   3       SAB        IAB        Ectopic        Molar        Multiple        Live Births   3               Menstrual History:     Patient's last menstrual period was 2018 (lmp unknown).       Sexual History:       ________________________________________  Patient Active Problem List   Diagnosis   • Well female exam with routine gynecological exam   • Dyslipidemia   • Palpitations   • Precordial pain   • Caffeine abuse (HCC)   • FH ischemic heart disease   • Abnormal EKG   • Premature atrial contraction   • Nonrheumatic mitral valve regurgitation   • Smoker unmotivated to quit     Past Medical History:   Diagnosis Date   • Abnormal Pap smear of cervix    • Asthma    • DDD (degenerative disc disease), thoracolumbar    •  Degenerative disc disease, lumbar    • Depression    • Dysmenorrhea 2008   • Elevated cholesterol    • Endomyometritis 2007   • Genital warts 1999   • Hx of renal calculi    • Hypertension    • Irregular heart beat 2007   • Menorrhagia 2008   • Sleep apnea     NO CPAP USE     Past Surgical History:   Procedure Laterality Date   • BREAST BIOPSY  2014    Left Breast Benign    • CARDIAC CATHETERIZATION N/A 7/30/2021    Procedure: Left Heart Cath;  Surgeon: Martin Lara MD;  Location:  [facility] CATH INVASIVE [facility];  Service: Cardiology;  Laterality: N/A;   • CARDIAC CATHETERIZATION N/A 7/30/2021    Procedure: Coronary angiography;  Surgeon: Maritn Lara MD;  Location:  [facility] CATH INVASIVE [facility]ATION;  Service: Cardiology;  Laterality: N/A;   • TEETH EXTRACTION     • TOTAL LAPAROSCOPIC HYSTERECTOMY SALPINGO OOPHORECTOMY Bilateral 6/14/2018    Procedure: TOTAL LAPAROSCOPIC HYSTERECTOMY BILATERAL SALPINGECTOMY  CYSTOSCOPY;  Surgeon: Henri Painter MD;  Location: [facility] MAIN OR;  Service: Obstetrics/Gynecology   • WISDOM TOOTH EXTRACTION  2007     Social History     Tobacco Use   Smoking Status Current Every Day Smoker   • Packs/day: 1.00   • Years: 30.00   • Pack years: 30.00   • Types: Cigarettes   Smokeless Tobacco Never Used     Family History   Problem Relation Age of Onset   • Hypertension Mother    • Heart disease Mother    • Asthma Mother    • Diabetes Mother    • Colon cancer Mother 26   • Melanoma Mother         mole on ankle   • Brain cancer Mother         unsure of age   • Cervical cancer Mother         unsure of age   • Stroke Father    • Hypertension Father    • Breast cancer Neg Hx    • Ovarian cancer Neg Hx    • Uterine cancer Neg Hx    • Prostate cancer Neg Hx    • Malig Hyperthermia Neg Hx      Prior to Admission medications    Medication Sig Start Date End Date Taking? Authorizing Provider   atorvastatin (LIPITOR) 20 MG tablet TAKE 1 TABLET BY MOUTH DAILY. 6/7/21  Yes Jane  "MD Martin   cetirizine (zyrTEC) 10 MG tablet Take 10 mg by mouth Daily.   Yes ProviderAntoinette MD   hydroCHLOROthiazide (HYDRODIURIL) 12.5 MG tablet TAKE 1 TABLET BY MOUTH 2 (TWO) TIMES A DAY. 6/7/21  Yes Martin Lara MD   metoprolol succinate XL (TOPROL-XL) 50 MG 24 hr tablet TAKE ONE-HALF TABLET BY MOUTH TWICE DAILY 9/16/21  Yes Martin Lara MD   naproxen (Naprosyn) 500 MG tablet Take 1 tablet by mouth 2 (Two) Times a Day With Meals.  Patient taking differently: Take 500 mg by mouth As Needed. 6/9/21 12/22/21  Marshall Gar PA   Symbicort 160-4.5 MCG/ACT inhaler  6/6/21 12/22/21  Antoinette Ross MD   VENTOLIN  (90 Base) MCG/ACT inhaler Inhale 2 puffs Every 4 (Four) Hours As Needed. 6/20/17 12/22/21  ProviderAntoinette MD     ________________________________________    Current contraception: status post hysterectomy  History of abnormal Pap smear: no  Family history of uterine or ovarian cancer: no  Family History of colon cancer/colon polyps: no  History of abnormal mammogram: no  History of abnormal lipids: no    The following portions of the patient's history were reviewed and updated as appropriate: allergies, current medications, past family history, past medical history, past social history, past surgical history and problem list.    Review of Systems    Pertinent items are noted in HPI.       Objective   Physical Exam    /78   Ht 165.1 cm (65\")   Wt 58.1 kg (128 lb)   LMP 06/12/2018 (LMP Unknown)   Breastfeeding No   BMI 21.30 kg/m²    BP Readings from Last 3 Encounters:   12/22/21 132/78   08/19/21 112/78   07/30/21 119/84      Wt Readings from Last 3 Encounters:   12/22/21 58.1 kg (128 lb)   08/19/21 55.3 kg (122 lb)   07/30/21 55.8 kg (123 lb)        BMI: Estimated body mass index is 21.3 kg/m² as calculated from the following:    Height as of this encounter: 165.1 cm (65\").    Weight as of this encounter: 58.1 kg (128 lb).       General: alert, " appears stated age and cooperative   Heart: regular rate and rhythm, S1, S2 normal, no murmur, click, rub or gallop   Lungs: clear to auscultation bilaterally   Abdomen: soft, non-tender, without masses, no organomegaly   Breast: inspection negative, no nipple discharge or bleeding, no masses or nodularity palpable   External genitalia/Vulva: mild atrophy, External genitalia including bartholin's glands, Urethra, [place]'s gland and urethra meatus are normal, Perineum, rectum and anus appear normal  and Bladder appears normal without significant prolapse    Vagina: normal mucosa, normal discharge   Cervix: absent   Uterus: absent    Adnexa: normal adnexa     As part of wellness and prevention, the following topics were discussed with the patient:  Encouraged self breast exam  Physical activity and regular exercised encouraged.   Smoking cessation discussed.    Assessment:    Diagnoses and all orders for this visit:    1. Well female exam with routine gynecological exam (Primary)    2. Screening mammogram, encounter for  -     Mammo Screening Digital Tomosynthesis Bilateral With CAD; Future    3. Low libido  Comments:  Testosterone cream 2% sent to compounding pharmacy with detailed instructions  See media tab for Rx    4. Smoker unmotivated to quit        Plan:  Return in about 1 year (around 12/22/2022) for Annual GYN exam.      Henri Painter MD  12/22/2021 10:08 EST

## 2022-01-24 RX ORDER — HYDROCHLOROTHIAZIDE 12.5 MG/1
12.5 TABLET ORAL
Qty: 60 TABLET | Refills: 3 | Status: SHIPPED | OUTPATIENT
Start: 2022-01-24 | End: 2023-03-21

## 2022-01-24 RX ORDER — ATORVASTATIN CALCIUM 20 MG/1
20 TABLET, FILM COATED ORAL DAILY
Qty: 30 TABLET | Refills: 3 | Status: SHIPPED | OUTPATIENT
Start: 2022-01-24

## 2022-03-24 ENCOUNTER — APPOINTMENT (OUTPATIENT)
Dept: MAMMOGRAPHY | Facility: HOSPITAL | Age: 47
End: 2022-03-24

## 2022-05-18 RX ORDER — METOPROLOL SUCCINATE 50 MG/1
TABLET, EXTENDED RELEASE ORAL
Qty: 90 TABLET | Refills: 1 | Status: SHIPPED | OUTPATIENT
Start: 2022-05-18 | End: 2022-08-15 | Stop reason: SDUPTHER

## 2022-08-03 ENCOUNTER — HOSPITAL ENCOUNTER (EMERGENCY)
Facility: HOSPITAL | Age: 47
Discharge: HOME | End: 2022-08-03
Attending: EMERGENCY MEDICINE | Admitting: EMERGENCY MEDICINE

## 2022-08-03 ENCOUNTER — APPOINTMENT (OUTPATIENT)
Dept: GENERAL RADIOLOGY | Facility: HOSPITAL | Age: 47
End: 2022-08-03

## 2022-08-03 VITALS
OXYGEN SATURATION: 97 % | HEART RATE: 76 BPM | TEMPERATURE: 97.9 F | HEIGHT: 65 IN | BODY MASS INDEX: 20.49 KG/M2 | WEIGHT: 123 LBS | DIASTOLIC BLOOD PRESSURE: 78 MMHG | SYSTOLIC BLOOD PRESSURE: 110 MMHG | RESPIRATION RATE: 20 BRPM

## 2022-08-03 DIAGNOSIS — R52 GENERALIZED BODY ACHES: Primary | ICD-10-CM

## 2022-08-03 DIAGNOSIS — E87.6 HYPOKALEMIA: ICD-10-CM

## 2022-08-03 LAB
ALBUMIN SERPL-MCNC: 4.2 G/DL (ref 3.5–5.2)
ALBUMIN/GLOB SERPL: 1.7 G/DL
ALP SERPL-CCNC: 88 U/L (ref 39–117)
ALT SERPL W P-5'-P-CCNC: 50 U/L (ref 1–33)
ANION GAP SERPL CALCULATED.3IONS-SCNC: 15.2 MMOL/L (ref 5–15)
AST SERPL-CCNC: 21 U/L (ref 1–32)
B PARAPERT DNA SPEC QL NAA+PROBE: NOT DETECTED
B PERT DNA SPEC QL NAA+PROBE: NOT DETECTED
BASOPHILS # BLD AUTO: 0.13 10*3/MM3 (ref 0–0.2)
BASOPHILS NFR BLD AUTO: 0.8 % (ref 0–1.5)
BILIRUB SERPL-MCNC: 0.5 MG/DL (ref 0–1.2)
BILIRUB UR QL STRIP: NEGATIVE
BUN SERPL-MCNC: 8 MG/DL (ref 6–20)
BUN/CREAT SERPL: 9.9 (ref 7–25)
C PNEUM DNA NPH QL NAA+NON-PROBE: NOT DETECTED
CALCIUM SPEC-SCNC: 9.3 MG/DL (ref 8.6–10.5)
CHLORIDE SERPL-SCNC: 96 MMOL/L (ref 98–107)
CK SERPL-CCNC: 28 U/L (ref 20–180)
CLARITY UR: ABNORMAL
CO2 SERPL-SCNC: 25.8 MMOL/L (ref 22–29)
COLOR UR: YELLOW
CREAT SERPL-MCNC: 0.81 MG/DL (ref 0.57–1)
DEPRECATED RDW RBC AUTO: 41.8 FL (ref 37–54)
EGFRCR SERPLBLD CKD-EPI 2021: 89.7 ML/MIN/1.73
EOSINOPHIL # BLD AUTO: 0.11 10*3/MM3 (ref 0–0.4)
EOSINOPHIL NFR BLD AUTO: 0.7 % (ref 0.3–6.2)
ERYTHROCYTE [DISTWIDTH] IN BLOOD BY AUTOMATED COUNT: 13.1 % (ref 12.3–15.4)
FLUAV SUBTYP SPEC NAA+PROBE: NOT DETECTED
FLUBV RNA ISLT QL NAA+PROBE: NOT DETECTED
GLOBULIN UR ELPH-MCNC: 2.5 GM/DL
GLUCOSE SERPL-MCNC: 129 MG/DL (ref 65–99)
GLUCOSE UR STRIP-MCNC: NEGATIVE MG/DL
HADV DNA SPEC NAA+PROBE: NOT DETECTED
HCOV 229E RNA SPEC QL NAA+PROBE: NOT DETECTED
HCOV HKU1 RNA SPEC QL NAA+PROBE: NOT DETECTED
HCOV NL63 RNA SPEC QL NAA+PROBE: NOT DETECTED
HCOV OC43 RNA SPEC QL NAA+PROBE: NOT DETECTED
HCT VFR BLD AUTO: 42.6 % (ref 34–46.6)
HGB BLD-MCNC: 14.8 G/DL (ref 12–15.9)
HGB UR QL STRIP.AUTO: NEGATIVE
HMPV RNA NPH QL NAA+NON-PROBE: NOT DETECTED
HOLD SPECIMEN: NORMAL
HOLD SPECIMEN: NORMAL
HPIV1 RNA ISLT QL NAA+PROBE: NOT DETECTED
HPIV2 RNA SPEC QL NAA+PROBE: NOT DETECTED
HPIV3 RNA NPH QL NAA+PROBE: NOT DETECTED
HPIV4 P GENE NPH QL NAA+PROBE: NOT DETECTED
IMM GRANULOCYTES # BLD AUTO: 0.14 10*3/MM3 (ref 0–0.05)
IMM GRANULOCYTES NFR BLD AUTO: 0.8 % (ref 0–0.5)
KETONES UR QL STRIP: NEGATIVE
LEUKOCYTE ESTERASE UR QL STRIP.AUTO: NEGATIVE
LYMPHOCYTES # BLD AUTO: 5 10*3/MM3 (ref 0.7–3.1)
LYMPHOCYTES NFR BLD AUTO: 29.7 % (ref 19.6–45.3)
M PNEUMO IGG SER IA-ACNC: NOT DETECTED
MAGNESIUM SERPL-MCNC: 1.6 MG/DL (ref 1.6–2.6)
MCH RBC QN AUTO: 30.6 PG (ref 26.6–33)
MCHC RBC AUTO-ENTMCNC: 34.7 G/DL (ref 31.5–35.7)
MCV RBC AUTO: 88.2 FL (ref 79–97)
MONOCYTES # BLD AUTO: 1.03 10*3/MM3 (ref 0.1–0.9)
MONOCYTES NFR BLD AUTO: 6.1 % (ref 5–12)
NEUTROPHILS NFR BLD AUTO: 10.45 10*3/MM3 (ref 1.7–7)
NEUTROPHILS NFR BLD AUTO: 61.9 % (ref 42.7–76)
NITRITE UR QL STRIP: NEGATIVE
NRBC BLD AUTO-RTO: 0 /100 WBC (ref 0–0.2)
NT-PROBNP SERPL-MCNC: 57.6 PG/ML (ref 0–450)
PH UR STRIP.AUTO: 6 [PH] (ref 5–8)
PLATELET # BLD AUTO: 349 10*3/MM3 (ref 140–450)
PMV BLD AUTO: 10.3 FL (ref 6–12)
POTASSIUM SERPL-SCNC: 3 MMOL/L (ref 3.5–5.2)
PROT SERPL-MCNC: 6.7 G/DL (ref 6–8.5)
PROT UR QL STRIP: NEGATIVE
QT INTERVAL: 391 MS
RBC # BLD AUTO: 4.83 10*6/MM3 (ref 3.77–5.28)
RHINOVIRUS RNA SPEC NAA+PROBE: NOT DETECTED
RSV RNA NPH QL NAA+NON-PROBE: NOT DETECTED
SARS-COV-2 RNA NPH QL NAA+NON-PROBE: NOT DETECTED
SODIUM SERPL-SCNC: 137 MMOL/L (ref 136–145)
SP GR UR STRIP: <=1.005 (ref 1–1.03)
TROPONIN T SERPL-MCNC: <0.01 NG/ML (ref 0–0.03)
UROBILINOGEN UR QL STRIP: ABNORMAL
WBC NRBC COR # BLD: 16.86 10*3/MM3 (ref 3.4–10.8)
WHOLE BLOOD HOLD COAG: NORMAL
WHOLE BLOOD HOLD SPECIMEN: NORMAL

## 2022-08-03 PROCEDURE — 96374 THER/PROPH/DIAG INJ IV PUSH: CPT

## 2022-08-03 PROCEDURE — 83880 ASSAY OF NATRIURETIC PEPTIDE: CPT | Performed by: NURSE PRACTITIONER

## 2022-08-03 PROCEDURE — 82550 ASSAY OF CK (CPK): CPT | Performed by: NURSE PRACTITIONER

## 2022-08-03 PROCEDURE — 81003 URINALYSIS AUTO W/O SCOPE: CPT | Performed by: NURSE PRACTITIONER

## 2022-08-03 PROCEDURE — 25010000002 ONDANSETRON PER 1 MG: Performed by: NURSE PRACTITIONER

## 2022-08-03 PROCEDURE — 0202U NFCT DS 22 TRGT SARS-COV-2: CPT | Performed by: NURSE PRACTITIONER

## 2022-08-03 PROCEDURE — 84484 ASSAY OF TROPONIN QUANT: CPT | Performed by: NURSE PRACTITIONER

## 2022-08-03 PROCEDURE — 80053 COMPREHEN METABOLIC PANEL: CPT | Performed by: EMERGENCY MEDICINE

## 2022-08-03 PROCEDURE — 85025 COMPLETE CBC W/AUTO DIFF WBC: CPT | Performed by: EMERGENCY MEDICINE

## 2022-08-03 PROCEDURE — 71045 X-RAY EXAM CHEST 1 VIEW: CPT

## 2022-08-03 PROCEDURE — 93010 ELECTROCARDIOGRAM REPORT: CPT | Performed by: INTERNAL MEDICINE

## 2022-08-03 PROCEDURE — 99283 EMERGENCY DEPT VISIT LOW MDM: CPT

## 2022-08-03 PROCEDURE — 25010000002 KETOROLAC TROMETHAMINE PER 15 MG: Performed by: NURSE PRACTITIONER

## 2022-08-03 PROCEDURE — 93005 ELECTROCARDIOGRAM TRACING: CPT | Performed by: NURSE PRACTITIONER

## 2022-08-03 PROCEDURE — 83735 ASSAY OF MAGNESIUM: CPT | Performed by: NURSE PRACTITIONER

## 2022-08-03 PROCEDURE — 96375 TX/PRO/DX INJ NEW DRUG ADDON: CPT

## 2022-08-03 RX ORDER — KETOROLAC TROMETHAMINE 15 MG/ML
15 INJECTION, SOLUTION INTRAMUSCULAR; INTRAVENOUS ONCE
Status: COMPLETED | OUTPATIENT
Start: 2022-08-03 | End: 2022-08-03

## 2022-08-03 RX ORDER — POTASSIUM CHLORIDE 750 MG/1
20 TABLET, FILM COATED, EXTENDED RELEASE ORAL 2 TIMES DAILY
Qty: 20 TABLET | Refills: 0 | Status: SHIPPED | OUTPATIENT
Start: 2022-08-03 | End: 2022-08-08

## 2022-08-03 RX ORDER — POTASSIUM CHLORIDE 750 MG/1
40 TABLET, FILM COATED, EXTENDED RELEASE ORAL ONCE
Status: COMPLETED | OUTPATIENT
Start: 2022-08-03 | End: 2022-08-03

## 2022-08-03 RX ORDER — ONDANSETRON 2 MG/ML
4 INJECTION INTRAMUSCULAR; INTRAVENOUS ONCE
Status: COMPLETED | OUTPATIENT
Start: 2022-08-03 | End: 2022-08-03

## 2022-08-03 RX ORDER — SODIUM CHLORIDE 0.9 % (FLUSH) 0.9 %
10 SYRINGE (ML) INJECTION AS NEEDED
Status: DISCONTINUED | OUTPATIENT
Start: 2022-08-03 | End: 2022-08-03 | Stop reason: HOSPADM

## 2022-08-03 RX ADMIN — ONDANSETRON 4 MG: 2 INJECTION INTRAMUSCULAR; INTRAVENOUS at 12:45

## 2022-08-03 RX ADMIN — POTASSIUM CHLORIDE 40 MEQ: 10 TABLET, EXTENDED RELEASE ORAL at 14:19

## 2022-08-03 RX ADMIN — KETOROLAC TROMETHAMINE 15 MG: 15 INJECTION, SOLUTION INTRAMUSCULAR; INTRAVENOUS at 12:45

## 2022-08-03 RX ADMIN — SODIUM CHLORIDE 1000 ML: 9 INJECTION, SOLUTION INTRAVENOUS at 12:43

## 2022-08-03 NOTE — ED PROVIDER NOTES
"EMERGENCY DEPARTMENT ENCOUNTER    Room Number:  29/29  Date seen:  8/3/2022  Time seen: 12:14 EDT  PCP: Liv Castro APRN  Historian: Patient    HPI:  Chief complaint: Severe fatigue, COVID symptoms  A complete HPI/ROS/PMH/PSH/SH/FH are unobtainable due to: Not applicable  Context:aKthleen Villanueva is a 46 y.o. female with proximal atrial fibs, dyslipidemia asthma and tobacco abuse who presents to the ED with c/o 3-4 days of severe fatigue and feeling \"worn out\" with all over body aches.  She has seen  PCP several times for this.  Symptoms are not made better/worse by anything.  She denies any shortness of breath, visual changes or headache.  Denies chest pain.  States she has has several negative covid tests.  Has completed some potassium replacement. Using inhaler for her asthma with improvement at home.      Patient was placed in face mask in first look. Patient was wearing facemask when I entered the room and throughout our encounter. I wore full protective equipment throughout this patient encounter including a N95 face mask, eye shield and gloves. Hand hygiene/washing of hands was performed before donning protective equipment and after removal when leaving the room.    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   • FH ischemic heart disease 08/26/2020   • Abnormal EKG 08/26/2020   • Premature atrial contraction 10/05/2020   • Nonrheumatic mitral valve regurgitation 10/05/2020   • Smoker unmotivated to quit 12/22/2021     Resolved Ambulatory Problems     Diagnosis Date Noted   • Tobacco use 09/13/2017   • Depo-Provera contraceptive status 09/13/2017   • Abnormal uterine bleeding (AUB) 05/23/2018   • Presence of biventricular cardiac " pacemaker 10/05/2020     Past Medical History:   Diagnosis Date   • Abnormal Pap smear of cervix 2007   • Asthma    • DDD (degenerative disc disease), thoracolumbar    • Degenerative disc disease, lumbar    • Depression    • Dysmenorrhea 2008   • Elevated cholesterol    • Endomyometritis 2007   • Genital warts 1999   • Hx of renal calculi    • Hypertension    • Irregular heart beat 2007   • Menorrhagia 2008   • Sleep apnea        PAST SURGICAL HISTORY  Past Surgical History:   Procedure Laterality Date   • BREAST BIOPSY  2014    Left Breast Benign    • CARDIAC CATHETERIZATION N/A 7/30/2021    Procedure: Left Heart Cath;  Surgeon: Martin Lara MD;  Location: [facility] CATH INVASIVE LOCATION;  Service: Cardiology;  Laterality: N/A;   • CARDIAC CATHETERIZATION N/A 7/30/2021    Procedure: Coronary angiography;  Surgeon: Martin Lara MD;  Location: [facility] CATH INVASIVE LOCATION;  Service: Cardiology;  Laterality: N/A;   • TEETH EXTRACTION     • TOTAL LAPAROSCOPIC HYSTERECTOMY SALPINGO OOPHORECTOMY Bilateral 6/14/2018    Procedure: TOTAL LAPAROSCOPIC HYSTERECTOMY BILATERAL SALPINGECTOMY  CYSTOSCOPY;  Surgeon: Henri Painter MD;  Location: [facility] OR;  Service: Obstetrics/Gynecology   • WISDOM TOOTH EXTRACTION  2007       FAMILY HISTORY  Family History   Problem Relation Age of Onset   • Hypertension Mother    • Heart disease Mother    • Asthma Mother    • Diabetes Mother    • Colon cancer Mother 26   • Melanoma Mother         mole on ankle   • Brain cancer Mother         unsure of age   • Cervical cancer Mother         unsure of age   • Stroke Father    • Hypertension Father    • Breast cancer Neg Hx    • Ovarian cancer Neg Hx    • Uterine cancer Neg Hx    • Prostate cancer Neg Hx    • Malig Hyperthermia Neg Hx        SOCIAL HISTORY  Social History     Socioeconomic History   • Marital status: Single   • Number of children: 3   • Years of education: ged   Tobacco Use   • Smoking status: Current Every  Day Smoker     Packs/day: 1.00     Years: 30.00     Pack years: 30.00     Types: Cigarettes   • Smokeless tobacco: Never Used   Vaping Use   • Vaping Use: Never used   Substance and Sexual Activity   • Alcohol use: Yes     Comment: rare   • Drug use: No   • Sexual activity: Yes     Partners: Male     Birth control/protection: Surgical     Comment: Hysterectomy 2018       REVIEW OF SYSTEMS  Review of Systems    All systems reviewed and negative except for those discussed in HPI.     PHYSICAL EXAM    ED Triage Vitals [08/03/22 1141]   Temp Heart Rate Resp BP SpO2   97.9 °F (36.6 °C) 100 20 119/86 99 %      Temp src Heart Rate Source Patient Position BP Location FiO2 (%)   -- -- -- -- --     Physical Exam    I have reviewed the triage vital signs and nursing notes.      GENERAL: not distressed  HENT: nares patent  EYES: no scleral icterus  NECK: no ROM limitations  CV: regular rhythm, regular rate, no murmur  RESPIRATORY: normal effort, CTAB  ABDOMEN: soft  : deferred  MUSCULOSKELETAL: no deformity  NEURO: alert, moves all extremities, follows commands  SKIN: warm, dry, no rashes    LAB RESULTS  Recent Results (from the past 24 hour(s))   Comprehensive Metabolic Panel    Collection Time: 08/03/22 12:12 PM    Specimen: Blood   Result Value Ref Range    Glucose 129 (H) 65 - 99 mg/dL    BUN 8 6 - 20 mg/dL    Creatinine 0.81 0.57 - 1.00 mg/dL    Sodium 137 136 - 145 mmol/L    Potassium 3.0 (L) 3.5 - 5.2 mmol/L    Chloride 96 (L) 98 - 107 mmol/L    CO2 25.8 22.0 - 29.0 mmol/L    Calcium 9.3 8.6 - 10.5 mg/dL    Total Protein 6.7 6.0 - 8.5 g/dL    Albumin 4.20 3.50 - 5.20 g/dL    ALT (SGPT) 50 (H) 1 - 33 U/L    AST (SGOT) 21 1 - 32 U/L    Alkaline Phosphatase 88 39 - 117 U/L    Total Bilirubin 0.5 0.0 - 1.2 mg/dL    Globulin 2.5 gm/dL    A/G Ratio 1.7 g/dL    BUN/Creatinine Ratio 9.9 7.0 - 25.0    Anion Gap 15.2 (H) 5.0 - 15.0 mmol/L    eGFR 89.7 >60.0 mL/min/1.73   CBC Auto Differential    Collection Time: 08/03/22 12:12  PM    Specimen: Blood   Result Value Ref Range    WBC 16.86 (H) 3.40 - 10.80 10*3/mm3    RBC 4.83 3.77 - 5.28 10*6/mm3    Hemoglobin 14.8 12.0 - 15.9 g/dL    Hematocrit 42.6 34.0 - 46.6 %    MCV 88.2 79.0 - 97.0 fL    MCH 30.6 26.6 - 33.0 pg    MCHC 34.7 31.5 - 35.7 g/dL    RDW 13.1 12.3 - 15.4 %    RDW-SD 41.8 37.0 - 54.0 fl    MPV 10.3 6.0 - 12.0 fL    Platelets 349 140 - 450 10*3/mm3    Neutrophil % 61.9 42.7 - 76.0 %    Lymphocyte % 29.7 19.6 - 45.3 %    Monocyte % 6.1 5.0 - 12.0 %    Eosinophil % 0.7 0.3 - 6.2 %    Basophil % 0.8 0.0 - 1.5 %    Immature Grans % 0.8 (H) 0.0 - 0.5 %    Neutrophils, Absolute 10.45 (H) 1.70 - 7.00 10*3/mm3    Lymphocytes, Absolute 5.00 (H) 0.70 - 3.10 10*3/mm3    Monocytes, Absolute 1.03 (H) 0.10 - 0.90 10*3/mm3    Eosinophils, Absolute 0.11 0.00 - 0.40 10*3/mm3    Basophils, Absolute 0.13 0.00 - 0.20 10*3/mm3    Immature Grans, Absolute 0.14 (H) 0.00 - 0.05 10*3/mm3    nRBC 0.0 0.0 - 0.2 /100 WBC   Green Top (Gel)    Collection Time: 08/03/22 12:12 PM   Result Value Ref Range    Extra Tube Hold for add-ons.    Lavender Top    Collection Time: 08/03/22 12:12 PM   Result Value Ref Range    Extra Tube hold for add-on    Gold Top - SST    Collection Time: 08/03/22 12:12 PM   Result Value Ref Range    Extra Tube Hold for add-ons.    Light Blue Top    Collection Time: 08/03/22 12:12 PM   Result Value Ref Range    Extra Tube Hold for add-ons.    BNP    Collection Time: 08/03/22 12:12 PM    Specimen: Blood   Result Value Ref Range    proBNP 57.6 0.0 - 450.0 pg/mL   Troponin    Collection Time: 08/03/22 12:12 PM    Specimen: Blood   Result Value Ref Range    Troponin T <0.010 0.000 - 0.030 ng/mL   CK    Collection Time: 08/03/22 12:12 PM    Specimen: Blood   Result Value Ref Range    Creatine Kinase 28 20 - 180 U/L   Magnesium    Collection Time: 08/03/22 12:12 PM    Specimen: Blood   Result Value Ref Range    Magnesium 1.6 1.6 - 2.6 mg/dL   ECG 12 Lead    Collection Time: 08/03/22 12:41  PM   Result Value Ref Range    QT Interval 391 ms   Respiratory Panel PCR w/COVID-19(SARS-CoV-2) MAKSIM/CYNTHIA/CORRIE/PAD/COR/MAD/CLARISSA In-House, NP Swab in UTM/VTM, 3-4 HR TAT - Swab, Nasopharynx    Collection Time: 08/03/22 12:51 PM    Specimen: Nasopharynx; Swab   Result Value Ref Range    ADENOVIRUS, PCR Not Detected Not Detected    Coronavirus 229E Not Detected Not Detected    Coronavirus HKU1 Not Detected Not Detected    Coronavirus NL63 Not Detected Not Detected    Coronavirus OC43 Not Detected Not Detected    COVID19 Not Detected Not Detected - Ref. Range    Human Metapneumovirus Not Detected Not Detected    Human Rhinovirus/Enterovirus Not Detected Not Detected    Influenza A PCR Not Detected Not Detected    Influenza B PCR Not Detected Not Detected    Parainfluenza Virus 1 Not Detected Not Detected    Parainfluenza Virus 2 Not Detected Not Detected    Parainfluenza Virus 3 Not Detected Not Detected    Parainfluenza Virus 4 Not Detected Not Detected    RSV, PCR Not Detected Not Detected    Bordetella pertussis pcr Not Detected Not Detected    Bordetella parapertussis PCR Not Detected Not Detected    Chlamydophila pneumoniae PCR Not Detected Not Detected    Mycoplasma pneumo by PCR Not Detected Not Detected   Urinalysis With Microscopic If Indicated (No Culture) - Urine, Clean Catch    Collection Time: 08/03/22  2:18 PM    Specimen: Urine, Clean Catch   Result Value Ref Range    Color, UA Yellow Yellow, Straw    Appearance, UA Cloudy (A) Clear    pH, UA 6.0 5.0 - 8.0    Specific Gravity, UA <=1.005 1.005 - 1.030    Glucose, UA Negative Negative    Ketones, UA Negative Negative    Bilirubin, UA Negative Negative    Blood, UA Negative Negative    Protein, UA Negative Negative    Leuk Esterase, UA Negative Negative    Nitrite, UA Negative Negative    Urobilinogen, UA 0.2 E.U./dL 0.2 - 1.0 E.U./dL         RADIOLOGY RESULTS  XR Chest 1 View    Result Date: 8/3/2022  XR CHEST 1 VW-  CONCLUSION: Cough, fatigue  COMPARISON  6/9/2021  FINDINGS: Heart size normal. Mediastinum and deysi within normal limits. Lungs are clear.  CONCLUSION: Normal chest  This report was finalized on 8/3/2022 1:26 PM by Dr. Yoni Mesa M.D.           PROGRESS, DATA ANALYSIS, CONSULTS AND MEDICAL DECISION MAKING  All labs have been independently reviewed by me.  All radiology studies have been reviewed by me and discussed with radiologist dictating the report.  EKG's independently viewed and interpreted by me unless stated otherwise. Discussion below represents my analysis of pertinent findings related to patient's condition, differential diagnosis, treatment plan and final disposition.     ED Course as of 08/03/22 1555   Wed Aug 03, 2022   1355 EKG          EKG time: 1241  Rhythm/Rate: Sinus 68  P waves and MA: Normal P waves and MA intervals  QRS, axis: Normal axis, normal QRS  ST and T waves: Unremarkable ST and T wave    Interpreted Contemporaneously by me, independently viewed  Not significantly changed compared to prior 2021   [DB]   1403 Viral RVP  negative.  I will replace potassium [EW]   1403 Potassium(!): 3.0 [EW]   1403 I viewed chest x-ray on PACS.  My interpretation are no focal infiltrates [EW]   1404 EKG   viewed by ER MD prior to my interpretation       EKG time: 1241  Rhythm/Rate: 68, sinus rhythm  P waves and MA: normal MA, normal CLEMENT  QRS, axis: normal QRS, normal axis  ST and T waves: no acute ST/T wave abnormalities    Interpreted Contemporaneously by me, independently viewed  This imaging is very similar to prior dated June 8, 2021   [EW]   1405 MDM/dispo:  Pt's viral RVP negative.  CK, trop and BNP normal, CXR clear.  She is not tachycardic or hypoxic. Abdominal re-exam is benign.  Will have her follow up with Primary Care. I will replace potassium for a few days.  [EW]      ED Course User Index  [DB] Dima Kumar MD  [EW] Hanny Salinas, RENE     DDX: body aches, headache, viral syndrome, UTI    Reviewed pt's history and workup  "with Dr. Kumar.      The patient's history, physical exam, and lab findings were discussed with the physician.  I discussed all results and noted any abnormalities with patient.  Discussed absoute need to recheck abnormalities with their family physician.  I answered any of the patient's questions.  Discussed plan for discharge, as there is no emergent indication for admission.  Pt is agreeable and understands need for follow up and repeat testing.  Pt is aware that discharge does not mean that nothing is wrong but it indicates no emergency is present and they must continue care with their family physician.  Pt is discharged with instructions to follow up with primary care doctor to have their blood pressure rechecked.     Disposition vitals:  /78   Pulse 61   Temp 97.9 °F (36.6 °C)   Resp 20   Ht 165.1 cm (65\")   Wt 55.8 kg (123 lb)   LMP 06/12/2018 (LMP Unknown)   SpO2 98%   BMI 20.47 kg/m²       DIAGNOSIS  Final diagnoses:   Generalized body aches   Hypokalemia       FOLLOW UP   Liv Castro APRN  [address] [place], [place] [id number] [phone]    Schedule an appointment as soon as possible for a visit in 1 week  for recheck labs       Hanny Salinas APRN  08/03/22 1556    "

## 2022-08-03 NOTE — ED NOTES
"Pt via PV from home with c/o body aches, weakness, fatigue, \"lungs hurting\", headache.  Multiple negative covid test done at PCP.     All triage performed with this RN wearing appropriate PPE.  Pt placed in mask upon arrival to ED.    "

## 2022-08-15 ENCOUNTER — OFFICE VISIT (OUTPATIENT)
Dept: CARDIOLOGY | Facility: CLINIC | Age: 47
End: 2022-08-15

## 2022-08-15 VITALS
HEIGHT: 65 IN | BODY MASS INDEX: 20.33 KG/M2 | WEIGHT: 122 LBS | DIASTOLIC BLOOD PRESSURE: 79 MMHG | HEART RATE: 75 BPM | SYSTOLIC BLOOD PRESSURE: 111 MMHG

## 2022-08-15 DIAGNOSIS — F15.10 CAFFEINE ABUSE: ICD-10-CM

## 2022-08-15 DIAGNOSIS — R94.31 ABNORMAL EKG: ICD-10-CM

## 2022-08-15 DIAGNOSIS — R00.2 PALPITATIONS: Primary | ICD-10-CM

## 2022-08-15 DIAGNOSIS — I49.1 PREMATURE ATRIAL CONTRACTION: ICD-10-CM

## 2022-08-15 PROCEDURE — 93000 ELECTROCARDIOGRAM COMPLETE: CPT | Performed by: INTERNAL MEDICINE

## 2022-08-15 PROCEDURE — 99214 OFFICE O/P EST MOD 30 MIN: CPT | Performed by: INTERNAL MEDICINE

## 2022-08-15 RX ORDER — ERGOCALCIFEROL 1.25 MG/1
CAPSULE ORAL
COMMUNITY
Start: 2022-06-21

## 2022-08-15 RX ORDER — MAGNESIUM 200 MG
TABLET ORAL
COMMUNITY
Start: 2022-07-20

## 2022-08-15 RX ORDER — CYCLOBENZAPRINE HCL 10 MG
10 TABLET ORAL
COMMUNITY
Start: 2022-07-08

## 2022-08-15 RX ORDER — BUSPIRONE HYDROCHLORIDE 15 MG/1
TABLET ORAL
COMMUNITY
Start: 2022-06-21

## 2022-08-15 RX ORDER — ALBUTEROL SULFATE 90 UG/1
AEROSOL, METERED RESPIRATORY (INHALATION)
COMMUNITY
Start: 2022-06-21

## 2022-08-15 RX ORDER — ALBUTEROL SULFATE 2.5 MG/3ML
SOLUTION RESPIRATORY (INHALATION)
COMMUNITY
Start: 2022-08-01

## 2022-08-15 RX ORDER — FLUTICASONE PROPIONATE 50 MCG
SPRAY, SUSPENSION (ML) NASAL
COMMUNITY
Start: 2022-08-01

## 2022-08-15 RX ORDER — NAPROXEN 250 MG/1
250 TABLET ORAL
COMMUNITY
Start: 2022-07-08

## 2022-08-15 RX ORDER — CETIRIZINE HYDROCHLORIDE, PSEUDOEPHEDRINE HYDROCHLORIDE 5; 120 MG/1; MG/1
TABLET, FILM COATED, EXTENDED RELEASE ORAL
COMMUNITY
Start: 2022-06-21 | End: 2022-08-15 | Stop reason: SDUPTHER

## 2022-08-15 NOTE — PROGRESS NOTES
1YR   Subjective:        Kathleen Villanueva is a 46 y.o. female who here for follow up    CC  Follow-up palpitation abnormal EKG PACs  HPI  46-year-old female with intermittent palpitation, abnormal EKG PACs here for the follow-up with occasional palpitations only     Problems Addressed this Visit        Cardiac and Vasculature    Palpitations - Primary    Abnormal EKG    Premature atrial contraction    Relevant Medications    metoprolol tartrate (LOPRESSOR) 25 MG tablet       Mental Health    Caffeine abuse (HCC)      Diagnoses       Codes Comments    Palpitations    -  Primary ICD-10-CM: R00.2  ICD-9-CM: 785.1     Abnormal EKG     ICD-10-CM: R94.31  ICD-9-CM: 794.31     Premature atrial contraction     ICD-10-CM: I49.1  ICD-9-CM: 427.61     Caffeine abuse (HCC)     ICD-10-CM: F15.10  ICD-9-CM: 305.90         .    The following portions of the patient's history were reviewed and updated as appropriate: allergies, current medications, past family history, past medical history, past social history, past surgical history and problem list.    Past Medical History:   Diagnosis Date   • Abnormal Pap smear of cervix 2007   • Asthma    • DDD (degenerative disc disease), thoracolumbar    • Degenerative disc disease, lumbar    • Depression    • Dysmenorrhea 2008   • Elevated cholesterol    • Endomyometritis 2007   • Genital warts 1999   • Hx of renal calculi    • Hypertension    • Irregular heart beat 2007   • Menorrhagia 2008   • Sleep apnea     NO CPAP USE     reports that she has been smoking cigarettes. She has a 30.00 pack-year smoking history. She has never used smokeless tobacco. She reports current alcohol use. She reports that she does not use drugs.   Family History   Problem Relation Age of Onset   • Hypertension Mother    • Heart disease Mother    • Asthma Mother    • Diabetes Mother    • Colon cancer Mother 26   • Melanoma Mother         mole on ankle   • Brain cancer Mother         unsure of age   • Cervical  "cancer Mother         unsure of age   • Stroke Father    • Hypertension Father    • Breast cancer Neg Hx    • Ovarian cancer Neg Hx    • Uterine cancer Neg Hx    • Prostate cancer Neg Hx    • Malig Hyperthermia Neg Hx        Review of Systems  Constitutional: No wt loss, fever, fatigue  Gastrointestinal: No nausea, abdominal pain  Behavioral/Psych: No insomnia or anxiety   Cardiovascular occasional palpitation  Objective:       Physical Exam  /79   Pulse 75   Ht 165.1 cm (65\")   Wt 55.3 kg (122 lb)   LMP 06/12/2018 (LMP Unknown)   BMI 20.30 kg/m²   General appearance: No acute changes   Neck: Trachea midline; NECK, supple, no thyromegaly or lymphadenopathy   Lungs: Normal size and shape, normal breath sounds, equal distribution of air, no rales and rhonchi   CV: S1-S2 regular, no murmurs, no rub, no gallop   Abdomen: Soft, nontender; no masses , no abnormal abdominal sounds   Extremities: No deformity , normal color , no peripheral edema   Skin: Normal temperature, turgor and texture; no rash, ulcers            ECG 12 Lead    Date/Time: 8/15/2022 10:55 AM  Performed by: Martin Lara MD  Authorized by: Martin Lara MD   Comparison: compared with previous ECG   Similar to previous ECG  Rhythm: sinus rhythm  ST Flattening: all    Clinical impression: non-specific ECG              Echocardiogram:        Current Outpatient Medications:   •  albuterol (PROVENTIL) (2.5 MG/3ML) 0.083% nebulizer solution, , Disp: , Rfl:   •  albuterol sulfate  (90 Base) MCG/ACT inhaler, , Disp: , Rfl:   •  atorvastatin (LIPITOR) 20 MG tablet, TAKE 1 TABLET BY MOUTH DAILY., Disp: 30 tablet, Rfl: 3  •  busPIRone (BUSPAR) 15 MG tablet, , Disp: , Rfl:   •  cetirizine (zyrTEC) 10 MG tablet, Take 10 mg by mouth Daily., Disp: , Rfl:   •  Cyanocobalamin (Vitamin B-12) 1000 MCG sublingual tablet, , Disp: , Rfl:   •  cyclobenzaprine (FLEXERIL) 10 MG tablet, 10 mg., Disp: , Rfl:   •  fluticasone (FLONASE) 50 " MCG/ACT nasal spray, , Disp: , Rfl:   •  hydroCHLOROthiazide (HYDRODIURIL) 12.5 MG tablet, TAKE 1 TABLET BY MOUTH 2 (TWO) TIMES A DAY., Disp: 60 tablet, Rfl: 3  •  metoprolol tartrate (LOPRESSOR) 25 MG tablet, , Disp: , Rfl:   •  naproxen (NAPROSYN) 250 MG tablet, 250 mg., Disp: , Rfl:   •  vitamin D (ERGOCALCIFEROL) 1.25 MG (41007 UT) capsule capsule, , Disp: , Rfl:    Assessment:        Patient Active Problem List   Diagnosis   • Well female exam with routine gynecological exam   • Dyslipidemia   • Palpitations   • Precordial pain   • Caffeine abuse (HCC)   • FH ischemic heart disease   • Abnormal EKG   • Premature atrial contraction   • Nonrheumatic mitral valve regurgitation   • Smoker unmotivated to quit               Plan:            ICD-10-CM ICD-9-CM   1. Palpitations  R00.2 785.1   2. Abnormal EKG  R94.31 794.31   3. Premature atrial contraction  I49.1 427.61   4. Caffeine abuse (HCC)  F15.10 305.90     1. Palpitations  Palpitations under control    2. Abnormal EKG  Apical chest pain    3. Premature atrial contraction  Atypical chest pain    4. Caffeine abuse (HCC)  Counseling done        1 yr  COUNSELING:    Kathleen Swanson was given to patient for the following topics: diagnostic results, risk factor reductions, impressions, risks and benefits of treatment options and importance of treatment compliance .       SMOKING COUNSELING:    [unfilled]    Dictated using Dragon dictation

## 2023-01-11 ENCOUNTER — OFFICE VISIT (OUTPATIENT)
Dept: OBSTETRICS AND GYNECOLOGY | Age: 48
End: 2023-01-11
Payer: COMMERCIAL

## 2023-01-11 VITALS
SYSTOLIC BLOOD PRESSURE: 122 MMHG | DIASTOLIC BLOOD PRESSURE: 74 MMHG | BODY MASS INDEX: 20.16 KG/M2 | HEIGHT: 65 IN | WEIGHT: 121 LBS

## 2023-01-11 DIAGNOSIS — Z01.419 WELL FEMALE EXAM WITH ROUTINE GYNECOLOGICAL EXAM: Primary | ICD-10-CM

## 2023-01-11 DIAGNOSIS — F17.200 SMOKER UNMOTIVATED TO QUIT: ICD-10-CM

## 2023-01-11 DIAGNOSIS — Z12.31 SCREENING MAMMOGRAM, ENCOUNTER FOR: ICD-10-CM

## 2023-01-11 DIAGNOSIS — I10 PRIMARY HYPERTENSION: ICD-10-CM

## 2023-01-11 DIAGNOSIS — E78.00 HYPERCHOLESTEROLEMIA: ICD-10-CM

## 2023-01-11 PROBLEM — R94.31 ABNORMAL EKG: Status: RESOLVED | Noted: 2020-08-26 | Resolved: 2023-01-11

## 2023-01-11 PROBLEM — I48.91 ATRIAL FIBRILLATION: Status: ACTIVE | Noted: 2023-01-11

## 2023-01-11 PROCEDURE — 99396 PREV VISIT EST AGE 40-64: CPT | Performed by: OBSTETRICS & GYNECOLOGY

## 2023-01-11 RX ORDER — POTASSIUM CHLORIDE 750 MG/1
TABLET, FILM COATED, EXTENDED RELEASE ORAL
COMMUNITY
Start: 2022-12-30

## 2023-01-11 RX ORDER — FLUTICASONE FUROATE, UMECLIDINIUM BROMIDE AND VILANTEROL TRIFENATATE 100; 62.5; 25 UG/1; UG/1; UG/1
POWDER RESPIRATORY (INHALATION)
COMMUNITY
Start: 2022-12-30

## 2023-01-11 NOTE — PROGRESS NOTES
[facility]   Obstetrics and Gynecology     2023    Patient: Kathleen Villanueva          MR#:[id number]    History of Present Illness    Chief Complaint   Patient presents with   • Gynecologic Exam     last pap 2020 neg, last mg 2020       47 y.o. female  who presents for annual exam.    The patient presents for regular annual check feeling well without complaints.  She had problems in the last year with intermittent atrial fibs and irregularities in her heartbeat.  She was previously hospitalized for severe hypokalemia    Studies reviewed:    Patient's last menstrual period was 2018 (lmp unknown).     Obstetric History:  OB History        3    Para   3    Term   3            AB        Living   3       SAB        IAB        Ectopic        Molar        Multiple        Live Births   3               Menstrual History:     Patient's last menstrual period was 2018 (lmp unknown).       Sexual History:       Social History     Substance and Sexual Activity   Sexual Activity Yes   • Partners: Male   • Birth control/protection: Surgical    Comment: Hysterectomy      ______________________________________  Patient Active Problem List   Diagnosis   • Dyslipidemia   • Palpitations   • Precordial pain   • Caffeine abuse (HCC)   • FH ischemic heart disease   • Premature atrial contraction   • Nonrheumatic mitral valve regurgitation   • Smoker unmotivated to quit   • Atrial fibrillation (HCC)   • Hypercholesterolemia   • Hypertension     Past Medical History:   Diagnosis Date   • Abnormal Pap smear of cervix    • Asthma    • DDD (degenerative disc disease), thoracolumbar    • Degenerative disc disease, lumbar    • Depression    • Dysmenorrhea    • Elevated cholesterol    • Endomyometritis    • Genital warts    • Hx of renal calculi    • Hypertension    • Irregular heart beat    • Menorrhagia    • Sleep apnea     NO CPAP USE     Past Surgical  History:   Procedure Laterality Date   • BREAST BIOPSY  2014    Left Breast Benign    • CARDIAC CATHETERIZATION N/A 7/30/2021    Procedure: Left Heart Cath;  Surgeon: Martin Lara MD;  Location:  [facility];  Service: Cardiology;  Laterality: N/A;   • CARDIAC CATHETERIZATION N/A 7/30/2021    Procedure: Coronary angiography;  Surgeon: Martin Lara MD;  Location:  [facility];  Service: Cardiology;  Laterality: N/A;   • TEETH EXTRACTION     • TOTAL LAPAROSCOPIC HYSTERECTOMY SALPINGO OOPHORECTOMY Bilateral 6/14/2018    Procedure: TOTAL LAPAROSCOPIC HYSTERECTOMY BILATERAL SALPINGECTOMY  CYSTOSCOPY;  Surgeon: Henri Painter MD;  Location:  [facility];  Service: Obstetrics/Gynecology   • WISDOM TOOTH EXTRACTION  2007     Social History     Tobacco Use   Smoking Status Every Day   • Packs/day: 1.00   • Years: 30.00   • Pack years: 30.00   • Types: Cigarettes   Smokeless Tobacco Never     Family History   Problem Relation Age of Onset   • Hypertension Mother    • Heart disease Mother    • Asthma Mother    • Diabetes Mother    • Colon cancer Mother 26   • Melanoma Mother         mole on ankle   • Brain cancer Mother         unsure of age   • Cervical cancer Mother         unsure of age   • Stroke Father    • Hypertension Father    • Breast cancer Neg Hx    • Ovarian cancer Neg Hx    • Uterine cancer Neg Hx    • Prostate cancer Neg Hx    • Malig Hyperthermia Neg Hx      Prior to Admission medications    Medication Sig Start Date End Date Taking? Authorizing Provider   albuterol (PROVENTIL) (2.5 MG/3ML) 0.083% nebulizer solution  8/1/22  Yes ProviderAntoinette MD   albuterol sulfate  (90 Base) MCG/ACT inhaler  6/21/22  Yes Antoinette Ross MD   atorvastatin (LIPITOR) 20 MG tablet TAKE 1 TABLET BY MOUTH DAILY. 1/24/22  Yes Martin Lara MD   cetirizine (zyrTEC) 10 MG tablet Take 10 mg by mouth Daily.   Yes Antoinette Ross MD  "  Cyanocobalamin (Vitamin B-12) 1000 MCG sublingual tablet  7/20/22  Yes Antoinette Ross MD   cyclobenzaprine (FLEXERIL) 10 MG tablet 10 mg. 7/8/22  Yes Antoinette Ross MD   hydroCHLOROthiazide (HYDRODIURIL) 12.5 MG tablet TAKE 1 TABLET BY MOUTH 2 (TWO) TIMES A DAY. 1/24/22  Yes Martin Lara MD   metoprolol tartrate (LOPRESSOR) 25 MG tablet Take 1 tablet by mouth 2 (Two) Times a Day. 1/5/23  Yes Martin Lara MD   naproxen (NAPROSYN) 250 MG tablet 250 mg. 7/8/22  Yes Antoinette Ross MD   potassium chloride 10 MEQ CR tablet  12/30/22  Yes Antoinette Ross MD Trelegy Ellipta 100-62.5-25 MCG/ACT inhaler  12/30/22  Yes Antoinette Ross MD   busPIRone (BUSPAR) 15 MG tablet  6/21/22   Antoinette Ross MD   fluticasone (FLONASE) 50 MCG/ACT nasal spray  8/1/22   Antoinette Ross MD   vitamin D (ERGOCALCIFEROL) 1.25 MG (01483 UT) capsule capsule  6/21/22   Antoinette Ross MD     _______________________________________    Current contraception: status post hysterectomy  History of abnormal Pap smear: no  Family history of uterine or ovarian cancer: no  Family History of colon cancer/colon polyps: no  History of abnormal mammogram: no  History of abnormal lipids: yes - treated    The following portions of the patient's history were reviewed and updated as appropriate: allergies, current medications, past family history, past medical history, past social history, past surgical history and problem list.    Review of Systems    Pertinent items are noted in HPI.       Objective   Physical Exam    /74   Ht 165.1 cm (65\")   Wt 54.9 kg (121 lb)   LMP 06/12/2018 (LMP Unknown)   BMI 20.14 kg/m²    BP Readings from Last 3 Encounters:   01/11/23 122/74   08/15/22 111/79   08/03/22 110/78      Wt Readings from Last 3 Encounters:   01/11/23 54.9 kg (121 lb)   08/15/22 55.3 kg (122 lb)   08/03/22 55.8 kg (123 lb)        BMI: Estimated body mass index is 20.14 kg/m² " "as calculated from the following:    Height as of this encounter: 165.1 cm (65\").    Weight as of this encounter: 54.9 kg (121 lb).       General: alert, appears stated age and cooperative   Heart: irregularly irregular rhythm   Lungs: clear to auscultation bilaterally   Abdomen: soft, non-tender, without masses, no organomegaly   Breast: inspection negative, no nipple discharge or bleeding, no masses or nodularity palpable   External genitalia/Vulva: External genitalia including bartholin's glands, Urethra, [place]'s gland and urethra meatus are normal, Perineum, rectum and anus appear normal  and Bladder appears normal without significant prolapse    Vagina: normal mucosa, normal discharge   Cervix: absent   Uterus: absent    Adnexa: normal adnexa     As part of wellness and prevention, the following topics were discussed with the patient:  Encouraged self breast exam  Physical activity and regular exercised encouraged.   Smoking cessation discussed.      Assessment:  Diagnoses and all orders for this visit:    1. Well female exam with routine gynecological exam (Primary)    2. Smoker unmotivated to quit    3. Primary hypertension    4. Hypercholesterolemia    5. Screening mammogram, encounter for  -     Mammo Screening Digital Tomosynthesis Bilateral With CAD; Future      Plan:  Return in about 1 year (around 1/11/2024) for Annual GYN exam.    Henri Painter MD  1/11/2023 10:45 EST  "

## 2023-02-06 ENCOUNTER — HOSPITAL ENCOUNTER (OUTPATIENT)
Dept: MAMMOGRAPHY | Facility: HOSPITAL | Age: 48
Discharge: HOME | End: 2023-02-06
Admitting: OBSTETRICS & GYNECOLOGY
Payer: COMMERCIAL

## 2023-02-06 DIAGNOSIS — Z12.31 SCREENING MAMMOGRAM, ENCOUNTER FOR: ICD-10-CM

## 2023-02-06 PROCEDURE — 77067 SCR MAMMO BI INCL CAD: CPT

## 2023-02-06 PROCEDURE — 77063 BREAST TOMOSYNTHESIS BI: CPT

## 2023-03-21 RX ORDER — HYDROCHLOROTHIAZIDE 12.5 MG/1
TABLET ORAL
Qty: 60 TABLET | Refills: 5 | Status: SHIPPED | OUTPATIENT
Start: 2023-03-21

## 2023-04-24 NOTE — TELEPHONE ENCOUNTER
Rx Refill Note  Requested Prescriptions     Pending Prescriptions Disp Refills   • metoprolol succinate XL (TOPROL-XL) 50 MG 24 hr tablet [Pharmacy Med Name: METOPROLOL SUCCINATE ER 50 50 Tablet] 30 tablet 0     Sig: TAKE ONE-HALF TABLET BY MOUTH TWICE DAILY      Last office visit with prescribing clinician: 8/15/2022   Last telemedicine visit with prescribing clinician: 8/7/2023   Next office visit with prescribing clinician: 8/7/2023                         Would you like a call back once the refill request has been completed: [] Yes [] No    If the office needs to give you a call back, can they leave a voicemail: [] Yes [] No    Annamarie Sun MA  04/24/23, 08:09 EDT

## 2023-04-25 ENCOUNTER — TELEPHONE (OUTPATIENT)
Dept: CARDIOLOGY | Facility: CLINIC | Age: 48
End: 2023-04-25

## 2023-04-25 NOTE — TELEPHONE ENCOUNTER
Caller: Kathleen Villanueva    Relationship: Self    Best call back number: [phone], PT WORKS NIGHT SHIFT AND SLEEPS UNTIL ABOUT 1300    Requested Prescriptions:   Requested Prescriptions     Pending Prescriptions Disp Refills   • metoprolol tartrate (LOPRESSOR) 25 MG tablet 60 tablet 2     Sig: Take 1 tablet by mouth 2 (Two) Times a Day.        Pharmacy where request should be sent: [facility] - [address] - [phone]  - [phone] FX     Last office visit with prescribing clinician: 8/15/2022   Last telemedicine visit with prescribing clinician: 8/7/2023   Next office visit with prescribing clinician: 8/7/2023     Additional details provided by patient: PT REQUESTING 90 DAY REFILLS IF POSSIBLE - PT REPORTS SHE IS ALMOST OUT OF MEDICATION - PT REPORTS PCP ASKED HER TO CUT BACK ON BP MED AND PT HAS BEEN HAVING SWELLING AND TROUBLE BENDING FINGERS - PCP CUT MED DUE TO POTASSIUM AND IS ONLY TAKING ONE PILL A DAY AND IT CAUSES HER FINGERS TO HURT WHEN BENDING - PT DOESN'T WANT HER KIDNEYS TO GET DEPENDENT     Does the patient have less than a 3 day supply:  [x] Yes  [] No      Nicolasa Levin Rep   04/25/23 16:09 EDT

## 2023-04-27 RX ORDER — METOPROLOL SUCCINATE 50 MG/1
TABLET, EXTENDED RELEASE ORAL
Qty: 30 TABLET | Refills: 0 | Status: SHIPPED | OUTPATIENT
Start: 2023-04-27

## 2023-05-19 ENCOUNTER — TELEPHONE (OUTPATIENT)
Dept: CARDIOLOGY | Facility: CLINIC | Age: 48
End: 2023-05-19

## 2023-05-19 NOTE — TELEPHONE ENCOUNTER
Caller: [facility]    Relationship to patient:     Best call back number:[phone]    Patient is needing: GAEL [facility] NEEDS CLARIFICATION ON METOPROLOL TARTRATE 25 MG PRESCRIPTION. PLEASE CONTACT GAEL. THANK YOU.

## 2023-05-19 NOTE — TELEPHONE ENCOUNTER
BP HAS BEEN LOW  SHOULD METOPROLOL BE SUCCINATE OR TARTRATE.    PT UNABLE TO TOLERATE TARTRATE AS IT IS DROPPING HER BP

## 2023-05-22 RX ORDER — METOPROLOL SUCCINATE 50 MG/1
25 TABLET, EXTENDED RELEASE ORAL 2 TIMES DAILY
Qty: 30 TABLET | Refills: 2 | Status: SHIPPED | OUTPATIENT
Start: 2023-05-22

## 2023-08-07 ENCOUNTER — OFFICE VISIT (OUTPATIENT)
Dept: CARDIOLOGY | Facility: CLINIC | Age: 48
End: 2023-08-07
Payer: COMMERCIAL

## 2023-08-07 VITALS
DIASTOLIC BLOOD PRESSURE: 82 MMHG | SYSTOLIC BLOOD PRESSURE: 137 MMHG | HEIGHT: 65 IN | HEART RATE: 60 BPM | WEIGHT: 124.4 LBS | BODY MASS INDEX: 20.73 KG/M2

## 2023-08-07 DIAGNOSIS — R00.2 PALPITATIONS: Primary | ICD-10-CM

## 2023-08-07 DIAGNOSIS — R07.2 PRECORDIAL PAIN: ICD-10-CM

## 2023-08-07 DIAGNOSIS — I49.1 PREMATURE ATRIAL CONTRACTION: ICD-10-CM

## 2023-08-07 DIAGNOSIS — R94.31 ABNORMAL EKG: ICD-10-CM

## 2023-08-07 DIAGNOSIS — E78.5 DYSLIPIDEMIA: ICD-10-CM

## 2023-08-07 PROCEDURE — 99214 OFFICE O/P EST MOD 30 MIN: CPT | Performed by: INTERNAL MEDICINE

## 2023-08-07 PROCEDURE — 93000 ELECTROCARDIOGRAM COMPLETE: CPT | Performed by: INTERNAL MEDICINE

## 2023-08-07 NOTE — PROGRESS NOTES
Subjective:        Kathleen Villanueva is a 47 y.o. female who here for follow up    CC  Palpitation  Smoking  cp  HPI  47-year-old here for the follow-up with a history of the palpitation and atypical chest pain     Problems Addressed this Visit          Cardiac and Vasculature    Dyslipidemia    Palpitations - Primary    Precordial pain    Abnormal EKG    Premature atrial contraction     Diagnoses         Codes Comments    Palpitations    -  Primary ICD-10-CM: R00.2  ICD-9-CM: 785.1     Abnormal EKG     ICD-10-CM: R94.31  ICD-9-CM: 794.31     Premature atrial contraction     ICD-10-CM: I49.1  ICD-9-CM: 427.61     Precordial pain     ICD-10-CM: R07.2  ICD-9-CM: 786.51     Dyslipidemia     ICD-10-CM: E78.5  ICD-9-CM: 272.4           .    The following portions of the patient's history were reviewed and updated as appropriate: allergies, current medications, past family history, past medical history, past social history, past surgical history and problem list.    Past Medical History:   Diagnosis Date    Abnormal Pap smear of cervix 2007    Asthma     DDD (degenerative disc disease), thoracolumbar     Degenerative disc disease, lumbar     Depression     Dysmenorrhea 2008    Elevated cholesterol     Endomyometritis 2007    Genital warts 1999    Hx of renal calculi     Hypertension     Irregular heart beat 2007    Menorrhagia 2008    Sleep apnea     NO CPAP USE     reports that she has been smoking cigarettes. She has a 30.00 pack-year smoking history. She has never used smokeless tobacco. She reports current alcohol use. She reports that she does not use drugs.   Family History   Problem Relation Age of Onset    Hypertension Mother     Heart disease Mother     Asthma Mother     Diabetes Mother     Colon cancer Mother 26    Melanoma Mother         mole on ankle    Brain cancer Mother         unsure of age    Cervical cancer Mother         unsure of age    Stroke Father     Hypertension Father     Breast cancer Neg Hx      "Ovarian cancer Neg Hx     Uterine cancer Neg Hx     Prostate cancer Neg Hx     Malig Hyperthermia Neg Hx        Review of Systems  Constitutional: No wt loss, fever, fatigue  Gastrointestinal: No nausea, abdominal pain  Behavioral/Psych: No insomnia or anxiety   Cardiovascular palpitation and atypical chest pain  Objective:       Physical Exam           Physical Exam  /82 (BP Location: Left arm)   Pulse 60   Ht 165.1 cm (65\")   Wt 56.4 kg (124 lb 6.4 oz)   LMP 06/12/2018 (LMP Unknown)   BMI 20.70 kg/mý     General appearance: No acute changes   Eyes: Sclerae conjunctivae normal, pupils reactive   HENT: Atraumatic; oropharynx clear with moist mucous membranes and no mucosal ulcerations;  Neck: Trachea midline; NECK, supple, no thyromegaly or lymphadenopathy   Lungs: Normal size and shape, normal breath sounds, equal distribution of air, no rales and rhonchi   CV: S1-S2 regular, no murmurs, no rub, no gallop   Abdomen: Soft, nontender; no masses , no abnormal abdominal sounds   Extremities: No deformity , normal color , no peripheral edema   Skin: Normal temperature, turgor and texture; no rash, ulcers  Psych: Appropriate affect, alert and oriented to person, place and time             ECG 12 Lead    Date/Time: 8/7/2023 10:19 AM  Performed by: Martin Lara MD  Authorized by: Martin Lara MD   Comparison: compared with previous ECG   Rhythm: sinus rhythm  ST Flattening: all    Clinical impression: non-specific ECG          Echocardiogram:        Current Outpatient Medications:     albuterol (PROVENTIL) (2.5 MG/3ML) 0.083% nebulizer solution, , Disp: , Rfl:     albuterol sulfate  (90 Base) MCG/ACT inhaler, , Disp: , Rfl:     atorvastatin (LIPITOR) 20 MG tablet, TAKE 1 TABLET BY MOUTH DAILY., Disp: 30 tablet, Rfl: 3    cetirizine (zyrTEC) 10 MG tablet, Take 1 tablet by mouth Daily., Disp: , Rfl:     Cyanocobalamin (Vitamin B-12) 1000 MCG sublingual tablet, , Disp: , Rfl:     " cyclobenzaprine (FLEXERIL) 10 MG tablet, Take 1 tablet by mouth As Needed., Disp: , Rfl:     fluticasone (FLONASE) 50 MCG/ACT nasal spray, , Disp: , Rfl:     metoprolol succinate XL (TOPROL-XL) 50 MG 24 hr tablet, Take 0.5 tablets by mouth 2 (Two) Times a Day., Disp: 30 tablet, Rfl: 2    naproxen (NAPROSYN) 250 MG tablet, Take 1 tablet by mouth As Needed., Disp: , Rfl:     Trelegy Ellipta 100-62.5-25 MCG/ACT inhaler, , Disp: , Rfl:    Assessment:        Patient Active Problem List   Diagnosis    Dyslipidemia    Palpitations    Precordial pain    Caffeine abuse    FH ischemic heart disease    Abnormal EKG    Premature atrial contraction    Nonrheumatic mitral valve regurgitation    Smoker unmotivated to quit    Atrial fibrillation    Hypercholesterolemia    Hypertension               Plan:            ICD-10-CM ICD-9-CM   1. Palpitations  R00.2 785.1   2. Abnormal EKG  R94.31 794.31   3. Premature atrial contraction  I49.1 427.61   4. Precordial pain  R07.2 786.51   5. Dyslipidemia  E78.5 272.4     1. Palpitations  Intermittent    2. Abnormal EKG  No angina pectoris    3. Premature atrial contraction  Control    4. Precordial pain  Atypical    5. Dyslipidemia  Continue current treatment      Off and on palpitation  1 yr  COUNSELING:    Kathleen Swanson was given to patient for the following topics: diagnostic results, risk factor reductions, impressions, risks and benefits of treatment options and importance of treatment compliance .       SMOKING COUNSELING:        Dictated using Dragon dictation

## 2023-09-18 RX ORDER — METOPROLOL SUCCINATE 50 MG/1
TABLET, EXTENDED RELEASE ORAL
Qty: 30 TABLET | Refills: 11 | Status: SHIPPED | OUTPATIENT
Start: 2023-09-18

## 2024-01-17 ENCOUNTER — OFFICE VISIT (OUTPATIENT)
Dept: OBSTETRICS AND GYNECOLOGY | Age: 49
End: 2024-01-17
Payer: COMMERCIAL

## 2024-01-17 VITALS
SYSTOLIC BLOOD PRESSURE: 118 MMHG | WEIGHT: 123 LBS | BODY MASS INDEX: 20.49 KG/M2 | HEIGHT: 65 IN | DIASTOLIC BLOOD PRESSURE: 70 MMHG

## 2024-01-17 DIAGNOSIS — Z12.31 SCREENING MAMMOGRAM FOR BREAST CANCER: ICD-10-CM

## 2024-01-17 DIAGNOSIS — Z11.51 SCREENING FOR HUMAN PAPILLOMAVIRUS (HPV): ICD-10-CM

## 2024-01-17 DIAGNOSIS — Z12.11 SCREEN FOR COLON CANCER: ICD-10-CM

## 2024-01-17 DIAGNOSIS — Z12.4 SCREENING FOR MALIGNANT NEOPLASM OF CERVIX: ICD-10-CM

## 2024-01-17 DIAGNOSIS — I10 PRIMARY HYPERTENSION: ICD-10-CM

## 2024-01-17 DIAGNOSIS — F17.200 SMOKER UNMOTIVATED TO QUIT: ICD-10-CM

## 2024-01-17 DIAGNOSIS — Z01.419 WELL FEMALE EXAM WITH ROUTINE GYNECOLOGICAL EXAM: Primary | ICD-10-CM

## 2024-01-17 DIAGNOSIS — Z13.89 SCREENING FOR BLOOD OR PROTEIN IN URINE: ICD-10-CM

## 2024-01-17 NOTE — PROGRESS NOTES
[facility]   Obstetrics and Gynecology     2024    Patient: Kathleen Villanueva          MR#:[id number]    History of Present Illness    Chief Complaint   Patient presents with    Gynecologic Exam     CC: Annual, last pap 20 neg, HPV not done, last mammo 23 neg, unable to void       48 y.o. female  who presents for annual exam.    The patient presents for her regular exam feeling well without complaints.  She had a prior hysterectomy.      Relevant data reviewed:    Patient's last menstrual period was 2018 (lmp unknown).  Obstetric History:  OB History          3    Para   3    Term   3            AB        Living   3         SAB        IAB        Ectopic        Molar        Multiple        Live Births   3               Menstrual History:     Patient's last menstrual period was 2018 (lmp unknown).       Social History     Substance and Sexual Activity   Sexual Activity Yes    Partners: Male    Birth control/protection: Surgical    Comment: Hysterectomy      ______________________________________  Patient Active Problem List   Diagnosis    Dyslipidemia    Palpitations    Precordial pain    Caffeine abuse    FH ischemic heart disease    Abnormal EKG    Premature atrial contraction    Nonrheumatic mitral valve regurgitation    Smoker unmotivated to quit    Atrial fibrillation    Hypercholesterolemia    Hypertension     Past Medical History:   Diagnosis Date    Abnormal Pap smear of cervix 2007    Asthma     DDD (degenerative disc disease), thoracolumbar     Degenerative disc disease, lumbar     Depression     Dysmenorrhea 2008    Elevated cholesterol     Endomyometritis 2007    Genital warts     Hx of renal calculi     Hypertension     Irregular heart beat 2007    Menorrhagia 2008    Sleep apnea     NO CPAP USE     Past Surgical History:   Procedure Laterality Date    BREAST BIOPSY      Left Breast Benign     CARDIAC CATHETERIZATION N/A 2021     Procedure: Left Heart Cath;  Surgeon: Martin Lara MD;  Location:  [facility];  Service: Cardiology;  Laterality: N/A;    CARDIAC CATHETERIZATION N/A 7/30/2021    Procedure: Coronary angiography;  Surgeon: Martin Lara MD;  Location:  [facility];  Service: Cardiology;  Laterality: N/A;    TEETH EXTRACTION      TOTAL LAPAROSCOPIC HYSTERECTOMY SALPINGO OOPHORECTOMY Bilateral 6/14/2018    Procedure: TOTAL LAPAROSCOPIC HYSTERECTOMY BILATERAL SALPINGECTOMY  CYSTOSCOPY;  Surgeon: Henri Painter MD;  [facility];  Service: Obstetrics/Gynecology    WISDOM TOOTH EXTRACTION  2007     Social History     Tobacco Use   Smoking Status Every Day    Packs/day: 1.00    Years: 30.00    Additional pack years: 0.00    Total pack years: 30.00    Types: Cigarettes    Passive exposure: Current   Smokeless Tobacco Never     Family History   Problem Relation Age of Onset    Hypertension Mother     Heart disease Mother     Asthma Mother     Diabetes Mother     Colon cancer Mother 26    Melanoma Mother         mole on ankle    Brain cancer Mother         unsure of age    Cervical cancer Mother         unsure of age    Stroke Father     Hypertension Father     Breast cancer Neg Hx     Ovarian cancer Neg Hx     Uterine cancer Neg Hx     Prostate cancer Neg Hx     Malig Hyperthermia Neg Hx      Prior to Admission medications    Medication Sig Start Date End Date Taking? Authorizing Provider   albuterol (PROVENTIL) (2.5 MG/3ML) 0.083% nebulizer solution  8/1/22  Yes ProviderAntoinette MD   atorvastatin (LIPITOR) 20 MG tablet TAKE 1 TABLET BY MOUTH DAILY. 1/24/22  Yes Martin Lara MD   cetirizine (zyrTEC) 10 MG tablet Take 1 tablet by mouth Daily.   Yes ProviderAntoinette MD   Cyanocobalamin (Vitamin B-12) 1000 MCG sublingual tablet  7/20/22  Yes Antoinette Ross MD   cyclobenzaprine (FLEXERIL) 10 MG tablet Take 1 tablet by mouth As Needed. 7/8/22  Yes  "Antoinette Ross MD   fluticasone (FLONASE) 50 MCG/ACT nasal spray  8/1/22  Yes Antoinette Ross MD   metoprolol succinate XL (TOPROL-XL) 50 MG 24 hr tablet TAKE ONE-HALF TABLET BY MOUTH 2 (TWO) TIMES A DAY. 9/18/23  Yes Martin Lara MD   naproxen (NAPROSYN) 250 MG tablet Take 1 tablet by mouth As Needed. 7/8/22  Yes Antointete Ross MD Trelegy Ellipta 100-62.5-25 MCG/ACT inhaler  12/30/22  Yes Antoinette Ross MD   albuterol sulfate  (90 Base) MCG/ACT inhaler  6/21/22 1/17/24  Antoinette Ross MD     _______________________________________    Current contraception: status post hysterectomy  History of abnormal Pap smear: no  Family history of uterine or ovarian cancer: no  Family History of colon cancer/colon polyps: no  History of abnormal mammogram: no  History of abnormal lipids: yes - treated    The following portions of the patient's history were reviewed and updated as appropriate: allergies, current medications, past family history, past medical history, past social history, past surgical history, and problem list.    Review of Systems    Pertinent items are noted in HPI.       Objective   Physical Exam    /70   Ht 165.1 cm (65\")   Wt 55.8 kg (123 lb)   LMP 06/12/2018 (LMP Unknown)   BMI 20.47 kg/m²    BP Readings from Last 3 Encounters:   01/17/24 118/70   08/07/23 137/82   01/11/23 122/74      Wt Readings from Last 3 Encounters:   01/17/24 55.8 kg (123 lb)   08/07/23 56.4 kg (124 lb 6.4 oz)   01/11/23 54.9 kg (121 lb)        BMI: Estimated body mass index is 20.47 kg/m² as calculated from the following:    Height as of this encounter: 165.1 cm (65\").    Weight as of this encounter: 55.8 kg (123 lb).       General: alert, appears stated age, and cooperative   Heart: regular rate and rhythm, S1, S2 normal, no murmur, click, rub or gallop   Lungs: clear to auscultation bilaterally   Abdomen: soft, non-tender, without masses, no organomegaly   Breast: " inspection negative, no nipple discharge or bleeding, no masses or nodularity palpable   External genitalia/Vulva: External genitalia including bartholin's glands, Urethra, [place]'s gland and urethra meatus are normal, Perineum, rectum and anus appear normal , and Bladder appears normal without significant prolapse    Vagina: normal mucosa, normal discharge   Cervix: absent    Uterus: absent    Adnexa: normal adnexa     As part of wellness and prevention, the following topics were discussed with the patient:  Encouraged self breast exam  Physical activity and regular exercised encouraged.         Problem List   Meds  History  Prep for Surg   Imagin}    Assessment:  Diagnoses and all orders for this visit:    1. Well female exam with routine gynecological exam (Primary)  -     IGP, Apt HPV,rfx 16 / 18,45    2. Screening for human papillomavirus (HPV)  -     IGP, Apt HPV,rfx 16 / 18,45    3. Screening for malignant neoplasm of cervix  -     IGP, Apt HPV,rfx 16 / 18,45    4. Screening mammogram for breast cancer  -     Mammo Screening Digital Tomosynthesis Bilateral With CAD; Future    5. Screening for blood or protein in urine  -     POC Urinalysis Dipstick    6. Screen for colon cancer  -     Ambulatory Referral For Screening Colonoscopy    7. Smoker unmotivated to quit    8. Primary hypertension      Plan:  Return in 1 year (on 2025) for Annual exam.    Henri Painter MD  2024 09:27 EST

## 2024-02-20 RX ORDER — ATORVASTATIN CALCIUM 20 MG/1
20 TABLET, FILM COATED ORAL
Qty: 30 TABLET | Refills: 5 | Status: SHIPPED | OUTPATIENT
Start: 2024-02-20

## 2024-02-26 ENCOUNTER — HOSPITAL ENCOUNTER (OUTPATIENT)
Dept: MAMMOGRAPHY | Facility: HOSPITAL | Age: 49
Discharge: HOME | End: 2024-02-26
Admitting: OBSTETRICS & GYNECOLOGY
Payer: COMMERCIAL

## 2024-02-26 DIAGNOSIS — Z12.31 SCREENING MAMMOGRAM FOR BREAST CANCER: ICD-10-CM

## 2024-02-26 PROCEDURE — 77063 BREAST TOMOSYNTHESIS BI: CPT

## 2024-02-26 PROCEDURE — 77067 SCR MAMMO BI INCL CAD: CPT

## 2024-07-01 RX ORDER — CETIRIZINE HCL 10 MG/1
10 TABLET ORAL DAILY
Qty: 30 TABLET | Refills: 1 | Status: SHIPPED | OUTPATIENT
Start: 2024-07-01

## 2024-08-26 ENCOUNTER — OFFICE VISIT (OUTPATIENT)
Dept: CARDIOLOGY | Facility: CLINIC | Age: 49
End: 2024-08-26
Payer: COMMERCIAL

## 2024-08-26 VITALS
BODY MASS INDEX: 22.16 KG/M2 | SYSTOLIC BLOOD PRESSURE: 114 MMHG | WEIGHT: 133 LBS | DIASTOLIC BLOOD PRESSURE: 78 MMHG | HEART RATE: 80 BPM | HEIGHT: 65 IN

## 2024-08-26 DIAGNOSIS — R07.2 PRECORDIAL PAIN: ICD-10-CM

## 2024-08-26 DIAGNOSIS — E78.5 DYSLIPIDEMIA: Primary | ICD-10-CM

## 2024-08-26 DIAGNOSIS — R00.2 PALPITATIONS: ICD-10-CM

## 2024-08-26 DIAGNOSIS — R94.31 ABNORMAL EKG: ICD-10-CM

## 2024-08-26 PROCEDURE — 99213 OFFICE O/P EST LOW 20 MIN: CPT | Performed by: INTERNAL MEDICINE

## 2024-08-26 PROCEDURE — 93000 ELECTROCARDIOGRAM COMPLETE: CPT | Performed by: INTERNAL MEDICINE

## 2024-08-26 NOTE — PROGRESS NOTES
1YR    Subjective:        Kathleen Villanueva is a 48 y.o. female who here for follow up    CC  Follow-up dyslipidemia palpitation chest pain  HPI  48-year-old female with chest pain palpitation and dyslipidemia here for the follow-up denies any chest pains tightness heaviness or the pressure sensation     Problems Addressed this Visit          Cardiac and Vasculature    Dyslipidemia - Primary    Palpitations    Precordial pain    Abnormal EKG     Diagnoses         Codes Comments    Dyslipidemia    -  Primary ICD-10-CM: E78.5  ICD-9-CM: 272.4     Palpitations     ICD-10-CM: R00.2  ICD-9-CM: 785.1     Precordial pain     ICD-10-CM: R07.2  ICD-9-CM: 786.51     Abnormal EKG     ICD-10-CM: R94.31  ICD-9-CM: 794.31           .    The following portions of the patient's history were reviewed and updated as appropriate: allergies, current medications, past family history, past medical history, past social history, past surgical history and problem list.    Past Medical History:   Diagnosis Date    Abnormal Pap smear of cervix 2007    Asthma     DDD (degenerative disc disease), thoracolumbar     Degenerative disc disease, lumbar     Depression     Dysmenorrhea 2008    Elevated cholesterol     Endomyometritis 2007    Genital warts 1999    Hx of renal calculi     Hypertension     Irregular heart beat 2007    Menorrhagia 2008    Sleep apnea     NO CPAP USE     reports that she has been smoking cigarettes. She has a 30 pack-year smoking history. She has been exposed to tobacco smoke. She has never used smokeless tobacco. She reports current alcohol use. She reports that she does not use drugs.   Family History   Problem Relation Age of Onset    Hypertension Mother     Heart disease Mother     Asthma Mother     Diabetes Mother     Colon cancer Mother 26    Melanoma Mother         mole on ankle    Brain cancer Mother         unsure of age    Cervical cancer Mother         unsure of age    Stroke Father     Hypertension Father      "Breast cancer Neg Hx     Ovarian cancer Neg Hx     Uterine cancer Neg Hx     Prostate cancer Neg Hx     Malig Hyperthermia Neg Hx        Review of Systems  Constitutional: No wt loss, fever, fatigue  Gastrointestinal: No nausea, abdominal pain  Behavioral/Psych: No insomnia or anxiety   Cardiovascular no chest pains or tightness in the chest  Objective:       Physical Exam  /78   Pulse 80   Ht 165.1 cm (65\")   Wt 60.3 kg (133 lb)   LMP 06/12/2018 (LMP Unknown)   BMI 22.13 kg/m²   General appearance: No acute changes   Neck: Trachea midline; NECK, supple, no thyromegaly or lymphadenopathy   Lungs: Normal size and shape, normal breath sounds, equal distribution of air, no rales and rhonchi   CV: S1-S2 regular, no murmurs, no rub, no gallop   Abdomen: Soft, nontender; no masses , no abnormal abdominal sounds   Extremities: No deformity , normal color , no peripheral edema   Skin: Normal temperature, turgor and texture; no rash, ulcers            ECG 12 Lead    Date/Time: 8/26/2024 1:11 PM  Performed by: Martin Lara MD    Authorized by: Martin Lara MD  Comparison: compared with previous ECG   Similar to previous ECG  Rhythm: sinus rhythm    Clinical impression: abnormal EKG            Echocardiogram:    Results for orders placed in visit on 06/10/21    Adult Transthoracic Echo Complete W/ Cont if Necessary Per Protocol    Interpretation Summary  · Estimated right ventricular systolic pressure from tricuspid regurgitation is normal (<35 mmHg).  · Calculated left ventricular EF = 54.5% Estimated left ventricular EF was in agreement with the calculated left ventricular EF.  · Left ventricular diastolic function was normal.  · There is no evidence of pericardial effusion          Current Outpatient Medications:     albuterol (PROVENTIL) (2.5 MG/3ML) 0.083% nebulizer solution, , Disp: , Rfl:     Allergy Relief/Indoor/Outdoor 10 MG tablet, TAKE 1 TABLET BY MOUTH DAILY, Disp: 30 tablet, Rfl: " 1    atorvastatin (LIPITOR) 20 MG tablet, TAKE 1 TABLET BY MOUTH AT BEDTIME, Disp: 30 tablet, Rfl: 5    Cyanocobalamin (Vitamin B-12) 1000 MCG sublingual tablet, , Disp: , Rfl:     cyclobenzaprine (FLEXERIL) 10 MG tablet, Take 1 tablet by mouth As Needed., Disp: , Rfl:     fluticasone (FLONASE) 50 MCG/ACT nasal spray, , Disp: , Rfl:     metoprolol succinate XL (TOPROL-XL) 50 MG 24 hr tablet, TAKE ONE-HALF TABLET BY MOUTH 2 (TWO) TIMES A DAY., Disp: 30 tablet, Rfl: 11    naproxen (NAPROSYN) 250 MG tablet, Take 1 tablet by mouth As Needed., Disp: , Rfl:     Trelegy Ellipta 100-62.5-25 MCG/ACT inhaler, , Disp: , Rfl:    Assessment:                Plan:          ICD-10-CM ICD-9-CM   1. Dyslipidemia  E78.5 272.4   2. Palpitations  R00.2 785.1   3. Precordial pain  R07.2 786.51   4. Abnormal EKG  R94.31 794.31     1. Dyslipidemia  Continue current treatment    2. Palpitations  Under control    3. Precordial pain  Atypical    4. Abnormal EKG  No angina pectoris      1 YR  COUNSELING:    Kathleen Swanson was given to patient for the following topics: diagnostic results, risk factor reductions, impressions, risks and benefits of treatment options and importance of treatment compliance .       SMOKING COUNSELING:        Dictated using Dragon dictation

## 2024-10-01 RX ORDER — METOPROLOL SUCCINATE 50 MG/1
TABLET, EXTENDED RELEASE ORAL
Qty: 30 TABLET | Refills: 11 | Status: SHIPPED | OUTPATIENT
Start: 2024-10-01

## 2024-10-11 ENCOUNTER — TELEPHONE (OUTPATIENT)
Dept: CARDIOLOGY | Facility: CLINIC | Age: 49
End: 2024-10-11

## 2024-10-28 ENCOUNTER — OFFICE VISIT (OUTPATIENT)
Dept: CARDIOLOGY | Facility: CLINIC | Age: 49
End: 2024-10-28
Payer: COMMERCIAL

## 2024-10-28 VITALS
HEIGHT: 65 IN | DIASTOLIC BLOOD PRESSURE: 86 MMHG | WEIGHT: 130 LBS | SYSTOLIC BLOOD PRESSURE: 127 MMHG | HEART RATE: 82 BPM | BODY MASS INDEX: 21.66 KG/M2

## 2024-10-28 DIAGNOSIS — R94.31 ABNORMAL EKG: ICD-10-CM

## 2024-10-28 DIAGNOSIS — R07.2 PRECORDIAL PAIN: ICD-10-CM

## 2024-10-28 DIAGNOSIS — R00.2 PALPITATIONS: Primary | ICD-10-CM

## 2024-10-28 DIAGNOSIS — E78.5 DYSLIPIDEMIA: ICD-10-CM

## 2024-10-28 PROCEDURE — 99214 OFFICE O/P EST MOD 30 MIN: CPT | Performed by: INTERNAL MEDICINE

## 2024-10-28 RX ORDER — MONTELUKAST SODIUM 10 MG/1
10 TABLET ORAL NIGHTLY
COMMUNITY
Start: 2024-09-11

## 2024-10-28 NOTE — PROGRESS NOTES
calcifications of the thoracic aorta.    Subjective:        Kathleen Villanueva is a 48 y.o. female who here for follow up    CC  Calcification of aorta  Occasional palpitation  HPI  48-year-old female has occasional palpitation with recent chest x-ray showing calcification of aorta here for the follow-up denies any chest pains or tightness in the chest     Problems Addressed this Visit          Cardiac and Vasculature    Dyslipidemia    Palpitations - Primary    Relevant Orders    Treadmill Stress Test    Precordial pain    Abnormal EKG     Diagnoses         Codes Comments    Palpitations    -  Primary ICD-10-CM: R00.2  ICD-9-CM: 785.1     Dyslipidemia     ICD-10-CM: E78.5  ICD-9-CM: 272.4     Abnormal EKG     ICD-10-CM: R94.31  ICD-9-CM: 794.31     Precordial pain     ICD-10-CM: R07.2  ICD-9-CM: 786.51           .    The following portions of the patient's history were reviewed and updated as appropriate: allergies, current medications, past family history, past medical history, past social history, past surgical history and problem list.    Past Medical History:   Diagnosis Date    Abnormal Pap smear of cervix 2007    Asthma     DDD (degenerative disc disease), thoracolumbar     Degenerative disc disease, lumbar     Depression     Dysmenorrhea 2008    Elevated cholesterol     Endomyometritis 2007    Genital warts 1999    Hx of renal calculi     Hypertension     Irregular heart beat 2007    Menorrhagia 2008    Sleep apnea     NO CPAP USE     reports that she has been smoking cigarettes. She has a 30 pack-year smoking history. She has been exposed to tobacco smoke. She has never used smokeless tobacco. She reports current alcohol use. She reports that she does not use drugs.   Family History   Problem Relation Age of Onset    Hypertension Mother     Heart disease Mother     Asthma Mother     Diabetes Mother     Colon cancer Mother 26    Melanoma Mother         mole on ankle    Brain cancer Mother         unsure of age  "   Cervical cancer Mother         unsure of age    Stroke Father     Hypertension Father     Breast cancer Neg Hx     Ovarian cancer Neg Hx     Uterine cancer Neg Hx     Prostate cancer Neg Hx     Malig Hyperthermia Neg Hx        Review of Systems  Constitutional: No wt loss, fever, fatigue  Gastrointestinal: No nausea, abdominal pain  Behavioral/Psych: No insomnia or anxiety   Cardiovascular no chest pains or tightness in the chest  Objective:       Physical Exam  /86   Pulse 82   Ht 165.1 cm (65\")   Wt 59 kg (130 lb)   BMI 21.63 kg/m²   General appearance: No acute changes   Neck: Trachea midline; NECK, supple, no thyromegaly or lymphadenopathy   Lungs: Normal size and shape, normal breath sounds, equal distribution of air, no rales and rhonchi   CV: S1-S2 regular, no murmurs, no rub, no gallop   Abdomen: Soft, nontender; no masses , no abnormal abdominal sounds   Extremities: No deformity , normal color , no peripheral edema   Skin: Normal temperature, turgor and texture; no rash, ulcers          Procedures      Echocardiogram:    Results for orders placed in visit on 06/10/21    Adult Transthoracic Echo Complete W/ Cont if Necessary Per Protocol    Interpretation Summary  · Estimated right ventricular systolic pressure from tricuspid regurgitation is normal (<35 mmHg).  · Calculated left ventricular EF = 54.5% Estimated left ventricular EF was in agreement with the calculated left ventricular EF.  · Left ventricular diastolic function was normal.  · There is no evidence of pericardial effusion          Current Outpatient Medications:     albuterol (PROVENTIL) (2.5 MG/3ML) 0.083% nebulizer solution, , Disp: , Rfl:     Allergy Relief/Indoor/Outdoor 10 MG tablet, TAKE 1 TABLET BY MOUTH DAILY, Disp: 30 tablet, Rfl: 1    atorvastatin (LIPITOR) 20 MG tablet, TAKE 1 TABLET BY MOUTH AT BEDTIME, Disp: 30 tablet, Rfl: 5    Cyanocobalamin (Vitamin B-12) 1000 MCG sublingual tablet, , Disp: , Rfl:     " cyclobenzaprine (FLEXERIL) 10 MG tablet, Take 1 tablet by mouth As Needed., Disp: , Rfl:     fluticasone (FLONASE) 50 MCG/ACT nasal spray, , Disp: , Rfl:     metoprolol succinate XL (TOPROL-XL) 50 MG 24 hr tablet, TAKE ONE HALF TABLET BY MOUTH TWICE A DAY, Disp: 30 tablet, Rfl: 11    montelukast (SINGULAIR) 10 MG tablet, Take 1 tablet by mouth Every Night., Disp: , Rfl:     naproxen (NAPROSYN) 250 MG tablet, Take 1 tablet by mouth As Needed., Disp: , Rfl:     Trelegy Ellipta 100-62.5-25 MCG/ACT inhaler, , Disp: , Rfl:    Assessment:                Plan:          ICD-10-CM ICD-9-CM   1. Palpitations  R00.2 785.1   2. Dyslipidemia  E78.5 272.4   3. Abnormal EKG  R94.31 794.31   4. Precordial pain  R07.2 786.51     1. Palpitations  Considering the patient's symptoms as well as clinical situation and  EKG findings, along with cardiac risk factors, ischemic workup is necessary to rule out ischemic cardiomyopathy, stress induced arrhythmias, and functional capacity for diagnosis as well as prognostic consideration    - Treadmill Stress Test    2. Dyslipidemia  Multifactorial    3. Abnormal EKG  No angina pectoris    4. Precordial pain  Atypical      Ett , if ok see in 1 yr  COUNSELING:    Kathleen Swanson was given to patient for the following topics: diagnostic results, risk factor reductions, impressions, risks and benefits of treatment options and importance of treatment compliance .       SMOKING COUNSELING:        Dictated using Dragon dictation

## 2024-10-30 ENCOUNTER — TELEPHONE (OUTPATIENT)
Dept: CARDIOLOGY | Facility: CLINIC | Age: 49
End: 2024-10-30

## 2024-10-30 NOTE — TELEPHONE ENCOUNTER
Caller: Kathleen Villanueva    Relationship to patient: Self    Best call back number: [phone]     Additional notes: PT CALLING TO SEE IF POSSIBLE TO BE SEEN SOONER FOR HER STRESS ECHO - SHE FOUND OUT SHE IS OFF THURSDAY AND FRIDAY AND CAN COME AS EARLY AS 8AM IF OPEN, IF NOT SHE WILL KEEP 1OM APPT

## 2024-11-01 ENCOUNTER — HOSPITAL ENCOUNTER (OUTPATIENT)
Dept: CARDIOLOGY | Facility: HOSPITAL | Age: 49
Discharge: HOME | End: 2024-11-01
Admitting: INTERNAL MEDICINE
Payer: COMMERCIAL

## 2024-11-01 LAB
BH CV STRESS BP STAGE 1: NORMAL
BH CV STRESS BP STAGE 2: NORMAL
BH CV STRESS BP STAGE 3: NORMAL
BH CV STRESS DURATION MIN STAGE 1: 3
BH CV STRESS DURATION MIN STAGE 2: 2
BH CV STRESS DURATION MIN STAGE 3: 0
BH CV STRESS DURATION SEC STAGE 1: 0
BH CV STRESS DURATION SEC STAGE 2: 59
BH CV STRESS DURATION SEC STAGE 3: 30
BH CV STRESS GRADE STAGE 1: 10
BH CV STRESS GRADE STAGE 2: 12
BH CV STRESS GRADE STAGE 3: 14
BH CV STRESS HR STAGE 1: 108
BH CV STRESS HR STAGE 2: 135
BH CV STRESS HR STAGE 3: 148
BH CV STRESS METS STAGE 1: 5
BH CV STRESS METS STAGE 2: 7.5
BH CV STRESS METS STAGE 3: 10
BH CV STRESS PROTOCOL 1: NORMAL
BH CV STRESS RECOVERY BP: NORMAL MMHG
BH CV STRESS RECOVERY HR: 80 BPM
BH CV STRESS SPEED STAGE 1: 1.7
BH CV STRESS SPEED STAGE 2: 2.5
BH CV STRESS SPEED STAGE 3: 3.4
BH CV STRESS STAGE 1: 1
BH CV STRESS STAGE 2: 2
BH CV STRESS STAGE 3: 3
MAXIMAL PREDICTED HEART RATE: 170 BPM
PERCENT MAX PREDICTED HR: 87.06 %
STRESS BASELINE BP: NORMAL MMHG
STRESS BASELINE HR: 62 BPM
STRESS PERCENT HR: 102 %
STRESS POST ESTIMATED WORKLOAD: 7.8 METS
STRESS POST EXERCISE DUR MIN: 6 MIN
STRESS POST EXERCISE DUR SEC: 31 SEC
STRESS POST PEAK BP: NORMAL MMHG
STRESS POST PEAK HR: 148 BPM
STRESS TARGET HR: 145 BPM

## 2024-11-01 PROCEDURE — 93017 CV STRESS TEST TRACING ONLY: CPT

## 2024-11-04 RX ORDER — ATORVASTATIN CALCIUM 20 MG/1
20 TABLET, FILM COATED ORAL
Qty: 30 TABLET | Refills: 5 | Status: SHIPPED | OUTPATIENT
Start: 2024-11-04

## 2024-11-06 ENCOUNTER — TELEPHONE (OUTPATIENT)
Dept: CARDIOLOGY | Facility: CLINIC | Age: 49
End: 2024-11-06
Payer: COMMERCIAL

## 2024-11-06 NOTE — TELEPHONE ENCOUNTER
----- Message from Teresa Pennington sent at 11/4/2024  8:52 AM EST -----  Please let patient know stress test is normal, ok to follow up in 1 year as scheduled. Thank you  teresa

## 2025-02-24 ENCOUNTER — OFFICE VISIT (OUTPATIENT)
Dept: OBSTETRICS AND GYNECOLOGY | Age: 50
End: 2025-02-24
Payer: COMMERCIAL

## 2025-02-24 VITALS
DIASTOLIC BLOOD PRESSURE: 78 MMHG | BODY MASS INDEX: 21.66 KG/M2 | WEIGHT: 130 LBS | SYSTOLIC BLOOD PRESSURE: 118 MMHG | HEIGHT: 65 IN

## 2025-02-24 DIAGNOSIS — Z12.31 SCREENING MAMMOGRAM FOR BREAST CANCER: ICD-10-CM

## 2025-02-24 DIAGNOSIS — I10 PRIMARY HYPERTENSION: ICD-10-CM

## 2025-02-24 DIAGNOSIS — F17.200 SMOKER UNMOTIVATED TO QUIT: ICD-10-CM

## 2025-02-24 DIAGNOSIS — Z13.89 SCREENING FOR BLOOD OR PROTEIN IN URINE: ICD-10-CM

## 2025-02-24 DIAGNOSIS — Z01.419 WELL FEMALE EXAM WITH ROUTINE GYNECOLOGICAL EXAM: Primary | ICD-10-CM

## 2025-02-24 DIAGNOSIS — Z12.11 SCREEN FOR COLON CANCER: ICD-10-CM

## 2025-02-24 RX ORDER — IBUPROFEN 600 MG/1
TABLET, FILM COATED ORAL
COMMUNITY
Start: 2024-07-03

## 2025-02-24 NOTE — PROGRESS NOTES
[facility]   Obstetrics and Gynecology     2025    Patient: Kathleen Villanueva          MR#:[id number]    History of Present Illness    Chief Complaint   Patient presents with    Gynecologic Exam     Annual, last pap 20 neg, hpv cancelled by lab, mammo 24 neg   No complaints       49 y.o. female  who presents for annual exam.    The patient presents for her regular exam feeling well without complaints.  She has had previous hysterectomy    Relevant data reviewed:    Patient's last menstrual period was 2018 (lmp unknown).  Obstetric History:  OB History          3    Para   3    Term   3            AB        Living   3         SAB        IAB        Ectopic        Molar        Multiple        Live Births   3               Menstrual History:     Patient's last menstrual period was 2018 (lmp unknown).       Social History     Substance and Sexual Activity   Sexual Activity Yes    Partners: Male    Birth control/protection: Surgical, Hysterectomy    Comment: Hysterectomy      ______________________________________  Patient Active Problem List   Diagnosis    Dyslipidemia    Palpitations    Precordial pain    Caffeine abuse    FH ischemic heart disease    Abnormal EKG    Premature atrial contraction    Nonrheumatic mitral valve regurgitation    Smoker unmotivated to quit    Atrial fibrillation    Hypercholesterolemia    Hypertension     Past Medical History:   Diagnosis Date    Abnormal Pap smear of cervix 2007    Asthma     DDD (degenerative disc disease), thoracolumbar     Degenerative disc disease, lumbar     Depression     Dysmenorrhea 2008    Elevated cholesterol     Endomyometritis 2007    Genital warts     Hx of renal calculi     Hypertension     Irregular heart beat 2007    Menorrhagia 2008    Sleep apnea     NO CPAP USE     Past Surgical History:   Procedure Laterality Date    BREAST BIOPSY      Left Breast Benign     CARDIAC CATHETERIZATION  N/A 7/30/2021    Procedure: Left Heart Cath;  Surgeon: Martin Lara MD;  Location:  [facility];  Service: Cardiology;  Laterality: N/A;    CARDIAC CATHETERIZATION N/A 7/30/2021    Procedure: Coronary angiography;  Surgeon: Martin Lara MD;  Location:  [facility];  Service: Cardiology;  Laterality: N/A;    TEETH EXTRACTION      TOTAL LAPAROSCOPIC HYSTERECTOMY SALPINGO OOPHORECTOMY Bilateral 6/14/2018    Procedure: TOTAL LAPAROSCOPIC HYSTERECTOMY BILATERAL SALPINGECTOMY  CYSTOSCOPY;  Surgeon: Henri Painter MD;  Location:  [facility];  Service: Obstetrics/Gynecology    WISDOM TOOTH EXTRACTION  2007     Social History     Tobacco Use   Smoking Status Every Day    Current packs/day: 1.00    Average packs/day: 1 pack/day for 30.0 years (30.0 ttl pk-yrs)    Types: Cigarettes    Passive exposure: Current   Smokeless Tobacco Never     Family History   Problem Relation Age of Onset    Hypertension Mother     Heart disease Mother     Asthma Mother     Diabetes Mother     Colon cancer Mother 26    Melanoma Mother         mole on ankle    Brain cancer Mother         unsure of age    Cervical cancer Mother         unsure of age    Stroke Father     Hypertension Father     Breast cancer Neg Hx     Ovarian cancer Neg Hx     Uterine cancer Neg Hx     Prostate cancer Neg Hx     Malig Hyperthermia Neg Hx      Prior to Admission medications    Medication Sig Start Date End Date Taking? Authorizing Provider   albuterol (PROVENTIL) (2.5 MG/3ML) 0.083% nebulizer solution  8/1/22  Yes Antoinette Ross MD   Allergy Relief/Indoor/Outdoor 10 MG tablet TAKE 1 TABLET BY MOUTH DAILY 7/1/24  Yes Martin Lara MD   atorvastatin (LIPITOR) 20 MG tablet TAKE 1 TABLET BY MOUTH AT BEDTIME 11/4/24  Yes Martin Lara MD   Cyanocobalamin (Vitamin B-12) 1000 MCG sublingual tablet  7/20/22  Yes Antoinette Ross MD   cyclobenzaprine (FLEXERIL) 10 MG tablet Take 1 tablet  "by mouth As Needed. 7/8/22  Yes Antoinette Ross MD   fluticasone (FLONASE) 50 MCG/ACT nasal spray  8/1/22  Yes Antoinette Ross MD   ibuprofen (ADVIL,MOTRIN) 600 MG tablet Take 1 tablet every 8 hours by oral route as needed for 30 days. 7/3/24  Yes Antoinette Ross MD   metoprolol succinate XL (TOPROL-XL) 50 MG 24 hr tablet TAKE ONE HALF TABLET BY MOUTH TWICE A DAY 10/1/24  Yes Martin Lara MD   naproxen (NAPROSYN) 250 MG tablet Take 1 tablet by mouth As Needed. 7/8/22  Yes Antoinette Ross MD   Trelegy Ellipta 100-62.5-25 MCG/ACT inhaler  12/30/22  Yes Antoinette Ross MD   montelukast (SINGULAIR) 10 MG tablet Take 1 tablet by mouth Every Night.  Patient not taking: Reported on 2/24/2025 9/11/24 2/24/25  Antoinette Ross MD     _______________________________________    Current contraception: post menopausal status  History of abnormal Pap smear: yes - remote abnormality   Family history of uterine or ovarian cancer: no  Family History of colon cancer/colon polyps: no  History of abnormal mammogram: no  History of abnormal lipids: yes - treated.     The following portions of the patient's history were reviewed and updated as appropriate: allergies, current medications, past family history, past medical history, past social history, past surgical history, and problem list.    Review of Systems    Pertinent items are noted in HPI.       Objective   Physical Exam    /78   Ht 165.1 cm (65\")   Wt 59 kg (130 lb)   LMP 06/12/2018 (LMP Unknown)   BMI 21.63 kg/m²    BP Readings from Last 3 Encounters:   02/24/25 118/78   10/28/24 127/86   08/26/24 114/78      Wt Readings from Last 3 Encounters:   02/24/25 59 kg (130 lb)   10/28/24 59 kg (130 lb)   08/26/24 60.3 kg (133 lb)        BMI: Estimated body mass index is 21.63 kg/m² as calculated from the following:    Height as of this encounter: 165.1 cm (65\").    Weight as of this encounter: 59 kg (130 lb).       General: alert, " appears stated age, and cooperative   Heart: regular rate and rhythm, S1, S2 normal, no murmur, click, rub or gallop   Lungs: clear to auscultation bilaterally   Abdomen: soft, non-tender, without masses, no organomegaly   Breast: inspection negative, no nipple discharge or bleeding, no masses or nodularity palpable   External genitalia/Vulva: External genitalia including bartholin's glands, Urethra, [place]'s gland and urethra meatus are normal, Perineum, rectum and anus appear normal , and Bladder appears normal without significant prolapse    Vagina: normal mucosa, normal discharge   Cervix: absent    Uterus: absent    Adnexa: normal adnexa     As part of wellness and prevention, the following topics were discussed with the patient:  Encouraged self breast exam  Physical activity and regular exercised encouraged.       Problem List   Meds  History  Prep for Surg   Imagin}    Assessment:  Diagnoses and all orders for this visit:    1. Well female exam with routine gynecological exam (Primary)  -     IGP, Apt HPV,rfx 16 / 18,45    2. Screening mammogram for breast cancer  -     Mammo Screening Digital Tomosynthesis Bilateral With CAD; Future    3. Screening for blood or protein in urine  -     POC Urinalysis Dipstick    4. Screen for colon cancer  -     Ambulatory Referral For Screening Colonoscopy    5. Primary hypertension    6. Smoker unmotivated to quit      Plan:  Return in 1 year (on 2026) for Annual exam.    Henri Painter MD  2025 09:20 EST

## 2025-04-14 ENCOUNTER — HOSPITAL ENCOUNTER (OUTPATIENT)
Dept: MAMMOGRAPHY | Facility: HOSPITAL | Age: 50
Discharge: HOME | End: 2025-04-14
Admitting: OBSTETRICS & GYNECOLOGY
Payer: COMMERCIAL

## 2025-04-14 DIAGNOSIS — Z12.31 SCREENING MAMMOGRAM FOR BREAST CANCER: ICD-10-CM

## 2025-04-14 PROCEDURE — 77067 SCR MAMMO BI INCL CAD: CPT

## 2025-04-14 PROCEDURE — 77063 BREAST TOMOSYNTHESIS BI: CPT

## 2025-05-21 RX ORDER — ATORVASTATIN CALCIUM 20 MG/1
20 TABLET, FILM COATED ORAL
Qty: 30 TABLET | Refills: 5 | Status: SHIPPED | OUTPATIENT
Start: 2025-05-21

## 2025-08-25 ENCOUNTER — APPOINTMENT (OUTPATIENT)
Dept: GENERAL RADIOLOGY | Facility: HOSPITAL | Age: 50
End: 2025-08-25
Payer: COMMERCIAL

## 2025-08-25 ENCOUNTER — HOSPITAL ENCOUNTER (EMERGENCY)
Facility: HOSPITAL | Age: 50
Discharge: HOME | End: 2025-08-25
Attending: EMERGENCY MEDICINE | Admitting: EMERGENCY MEDICINE
Payer: COMMERCIAL

## 2025-08-25 ENCOUNTER — APPOINTMENT (OUTPATIENT)
Dept: CARDIOLOGY | Facility: HOSPITAL | Age: 50
End: 2025-08-25
Payer: COMMERCIAL

## 2025-08-25 VITALS
HEIGHT: 65 IN | HEART RATE: 73 BPM | TEMPERATURE: 98.1 F | SYSTOLIC BLOOD PRESSURE: 118 MMHG | OXYGEN SATURATION: 95 % | BODY MASS INDEX: 20.16 KG/M2 | RESPIRATION RATE: 18 BRPM | WEIGHT: 121 LBS | DIASTOLIC BLOOD PRESSURE: 81 MMHG

## 2025-08-25 DIAGNOSIS — R00.2 PALPITATIONS: ICD-10-CM

## 2025-08-25 DIAGNOSIS — R07.9 ACUTE CHEST PAIN: Primary | ICD-10-CM

## 2025-08-25 DIAGNOSIS — R11.0 NAUSEA: ICD-10-CM

## 2025-08-25 LAB
ALBUMIN SERPL-MCNC: 4.1 G/DL (ref 3.5–5.2)
ALBUMIN/GLOB SERPL: 1.4 G/DL
ALP SERPL-CCNC: 101 U/L (ref 39–117)
ALT SERPL W P-5'-P-CCNC: 23 U/L (ref 1–33)
ANION GAP SERPL CALCULATED.3IONS-SCNC: 14.1 MMOL/L (ref 5–15)
AST SERPL-CCNC: 20 U/L (ref 1–32)
BASOPHILS # BLD AUTO: 0.06 10*3/MM3 (ref 0–0.2)
BASOPHILS NFR BLD AUTO: 0.5 % (ref 0–1.5)
BILIRUB SERPL-MCNC: 0.3 MG/DL (ref 0–1.2)
BUN SERPL-MCNC: 14 MG/DL (ref 6–20)
BUN/CREAT SERPL: 15.4 (ref 7–25)
CALCIUM SPEC-SCNC: 9.6 MG/DL (ref 8.6–10.5)
CHLORIDE SERPL-SCNC: 102 MMOL/L (ref 98–107)
CO2 SERPL-SCNC: 21.9 MMOL/L (ref 22–29)
CREAT SERPL-MCNC: 0.91 MG/DL (ref 0.57–1)
DEPRECATED RDW RBC AUTO: 41.4 FL (ref 37–54)
EGFRCR SERPLBLD CKD-EPI 2021: 76.5 ML/MIN/1.73
EOSINOPHIL # BLD AUTO: 0.24 10*3/MM3 (ref 0–0.4)
EOSINOPHIL NFR BLD AUTO: 2.2 % (ref 0.3–6.2)
ERYTHROCYTE [DISTWIDTH] IN BLOOD BY AUTOMATED COUNT: 12.6 % (ref 12.3–15.4)
GEN 5 1HR TROPONIN T REFLEX: <6 NG/L
GLOBULIN UR ELPH-MCNC: 2.9 GM/DL
GLUCOSE SERPL-MCNC: 113 MG/DL (ref 65–99)
HCT VFR BLD AUTO: 45.4 % (ref 34–46.6)
HGB BLD-MCNC: 15.2 G/DL (ref 12–15.9)
HOLD SPECIMEN: NORMAL
HOLD SPECIMEN: NORMAL
IMM GRANULOCYTES # BLD AUTO: 0.03 10*3/MM3 (ref 0–0.05)
IMM GRANULOCYTES NFR BLD AUTO: 0.3 % (ref 0–0.5)
LYMPHOCYTES # BLD AUTO: 3.46 10*3/MM3 (ref 0.7–3.1)
LYMPHOCYTES NFR BLD AUTO: 31.3 % (ref 19.6–45.3)
MCH RBC QN AUTO: 29.8 PG (ref 26.6–33)
MCHC RBC AUTO-ENTMCNC: 33.5 G/DL (ref 31.5–35.7)
MCV RBC AUTO: 89 FL (ref 79–97)
MONOCYTES # BLD AUTO: 0.72 10*3/MM3 (ref 0.1–0.9)
MONOCYTES NFR BLD AUTO: 6.5 % (ref 5–12)
NEUTROPHILS NFR BLD AUTO: 59.2 % (ref 42.7–76)
NEUTROPHILS NFR BLD AUTO: 6.53 10*3/MM3 (ref 1.7–7)
NRBC BLD AUTO-RTO: 0 /100 WBC (ref 0–0.2)
PLATELET # BLD AUTO: 284 10*3/MM3 (ref 140–450)
PMV BLD AUTO: 10.3 FL (ref 6–12)
POTASSIUM SERPL-SCNC: 3.8 MMOL/L (ref 3.5–5.2)
PROT SERPL-MCNC: 7 G/DL (ref 6–8.5)
QT INTERVAL: 407 MS
QTC INTERVAL: 435 MS
RBC # BLD AUTO: 5.1 10*6/MM3 (ref 3.77–5.28)
SODIUM SERPL-SCNC: 138 MMOL/L (ref 136–145)
TROPONIN T NUMERIC DELTA: NORMAL
TROPONIN T SERPL HS-MCNC: <6 NG/L
WBC NRBC COR # BLD AUTO: 11.04 10*3/MM3 (ref 3.4–10.8)
WHOLE BLOOD HOLD COAG: NORMAL
WHOLE BLOOD HOLD SPECIMEN: NORMAL

## 2025-08-25 PROCEDURE — 71045 X-RAY EXAM CHEST 1 VIEW: CPT

## 2025-08-25 PROCEDURE — 93005 ELECTROCARDIOGRAM TRACING: CPT

## 2025-08-25 PROCEDURE — 85025 COMPLETE CBC W/AUTO DIFF WBC: CPT

## 2025-08-25 PROCEDURE — 93246 EXT ECG>7D<15D RECORDING: CPT

## 2025-08-25 PROCEDURE — 93005 ELECTROCARDIOGRAM TRACING: CPT | Performed by: EMERGENCY MEDICINE

## 2025-08-25 PROCEDURE — 99284 EMERGENCY DEPT VISIT MOD MDM: CPT

## 2025-08-25 PROCEDURE — 84484 ASSAY OF TROPONIN QUANT: CPT | Performed by: EMERGENCY MEDICINE

## 2025-08-25 PROCEDURE — 84484 ASSAY OF TROPONIN QUANT: CPT

## 2025-08-25 PROCEDURE — 36415 COLL VENOUS BLD VENIPUNCTURE: CPT

## 2025-08-25 PROCEDURE — 80053 COMPREHEN METABOLIC PANEL: CPT

## 2025-08-25 RX ORDER — ASPIRIN 325 MG
325 TABLET ORAL ONCE
Status: DISCONTINUED | OUTPATIENT
Start: 2025-08-25 | End: 2025-08-25 | Stop reason: HOSPADM

## 2025-08-25 RX ORDER — SODIUM CHLORIDE 0.9 % (FLUSH) 0.9 %
10 SYRINGE (ML) INJECTION AS NEEDED
Status: DISCONTINUED | OUTPATIENT
Start: 2025-08-25 | End: 2025-08-25 | Stop reason: HOSPADM

## 2025-08-29 ENCOUNTER — TELEPHONE (OUTPATIENT)
Dept: CARDIOLOGY | Facility: CLINIC | Age: 50
End: 2025-08-29
Payer: COMMERCIAL

## (undated) DEVICE — KTTNER ENDO BLNT DISSCT

## (undated) DEVICE — GLV SURG TRIUMPH CLASSIC PF LTX 7.5 STRL

## (undated) DEVICE — SYR LUERLOK 50ML

## (undated) DEVICE — CATH DIAG IMPULSE FL3.5 5F 100CM

## (undated) DEVICE — MANIP UTER RUMI 2 KOH EFFICIENT SS CP 3.5CM

## (undated) DEVICE — HARMONIC ACE +7 LAPAROSCOPIC SHEARS ADVANCED HEMOSTASIS 5MM DIAMETER 36CM SHAFT LENGTH  FOR USE WITH GRAY HAND PIECE ONLY: Brand: HARMONIC ACE

## (undated) DEVICE — APPL CHLORAPREP W/TINT 26ML ORNG

## (undated) DEVICE — DISPOSABLE GRASPER CARTRIDGE: Brand: DIRECT DRIVE REPOSABLE GRASPERS

## (undated) DEVICE — ANTIBACTERIAL UNDYED BRAIDED (POLYGLACTIN 910), SYNTHETIC ABSORBABLE SUTURE: Brand: COATED VICRYL

## (undated) DEVICE — TOTAL TRAY, 16FR 10ML SIL FOLEY, URN: Brand: MEDLINE

## (undated) DEVICE — PK CATH CARD 40

## (undated) DEVICE — ENDOPATH PNEUMONEEDLE INSUFFLATION NEEDLES WITH LUER LOCK CONNECTORS 120MM: Brand: ENDOPATH

## (undated) DEVICE — ENDOCUT SCISSOR TIP, DISPOSABLE: Brand: RENEW

## (undated) DEVICE — SUT VIC 0/0 UR6 27IN DYED J603H

## (undated) DEVICE — DRSNG WND BORDR/ADHS NONADHR/GZ LF 2X2IN STRL

## (undated) DEVICE — GW EMR FIX EXCHG J STD .035 3MM 260CM

## (undated) DEVICE — GLV SURG BIOGEL LTX PF 7 1/2

## (undated) DEVICE — COVER,MAYO STAND,STERILE: Brand: MEDLINE

## (undated) DEVICE — IRRIGATOR BULB ASEPTO 60CC STRL

## (undated) DEVICE — SOL NACL 0.9PCT 1000ML

## (undated) DEVICE — CATH4F INF PIG 145Â° MOD 110CM: Brand: INFINITI

## (undated) DEVICE — GOWN,NON-REINFORCED,SIRUS,SET IN SLV,XL: Brand: MEDLINE

## (undated) DEVICE — GLIDESHEATH SLENDER STAINLESS STEEL KIT: Brand: GLIDESHEATH SLENDER

## (undated) DEVICE — APPL HEMOS FOR DELIVERY FLOSEAL

## (undated) DEVICE — 2, DISPOSABLE SUCTION/IRRIGATOR WITH DISPOSABLE TIP: Brand: STRYKEFLOW

## (undated) DEVICE — ENDOPATH XCEL BLADELESS TROCARS WITH STABILITY SLEEVES: Brand: ENDOPATH XCEL

## (undated) DEVICE — KT MANIFLD CARDIAC

## (undated) DEVICE — MANIP UTER RUMI TP 5.1MM 6CM LAV

## (undated) DEVICE — CATH DIAG IMPULSE FR4 5F 100CM

## (undated) DEVICE — TROCAR: Brand: KII SLEEVE

## (undated) DEVICE — LOU GYN LAPAROSCOPY: Brand: MEDLINE INDUSTRIES, INC.

## (undated) DEVICE — SUTURING DEVICE: Brand: ENDO STITCH

## (undated) DEVICE — DEV SUT GRSPR CLOSUR 15CM 14G

## (undated) DEVICE — TROCAR: Brand: KII OPTICAL ACCESS SYSTEM

## (undated) DEVICE — SPNG GZ 2X2 8PLY NS